# Patient Record
Sex: FEMALE | Race: WHITE | NOT HISPANIC OR LATINO | Employment: FULL TIME | URBAN - METROPOLITAN AREA
[De-identification: names, ages, dates, MRNs, and addresses within clinical notes are randomized per-mention and may not be internally consistent; named-entity substitution may affect disease eponyms.]

---

## 2017-02-14 ENCOUNTER — OFFICE VISIT (OUTPATIENT)
Dept: OBSTETRICS AND GYNECOLOGY | Facility: CLINIC | Age: 52
End: 2017-02-14

## 2017-02-14 VITALS
BODY MASS INDEX: 25.33 KG/M2 | HEIGHT: 65 IN | DIASTOLIC BLOOD PRESSURE: 70 MMHG | WEIGHT: 152 LBS | SYSTOLIC BLOOD PRESSURE: 118 MMHG

## 2017-02-14 DIAGNOSIS — G43.829 MENSTRUAL MIGRAINE WITHOUT STATUS MIGRAINOSUS, NOT INTRACTABLE: ICD-10-CM

## 2017-02-14 DIAGNOSIS — Z00.00 ANNUAL PHYSICAL EXAM: Primary | ICD-10-CM

## 2017-02-14 PROCEDURE — 99396 PREV VISIT EST AGE 40-64: CPT | Performed by: OBSTETRICS & GYNECOLOGY

## 2017-02-14 RX ORDER — GABAPENTIN 100 MG/1
100 CAPSULE ORAL AS NEEDED
COMMUNITY
Start: 2017-01-06 | End: 2019-03-06

## 2017-02-14 RX ORDER — ETONOGESTREL AND ETHINYL ESTRADIOL 11.7; 2.7 MG/1; MG/1
1 INSERT, EXTENDED RELEASE VAGINAL
Qty: 1 EACH | Refills: 12 | Status: SHIPPED | OUTPATIENT
Start: 2017-02-14 | End: 2018-02-14

## 2017-02-14 RX ORDER — SODIUM PHOSPHATE,MONO-DIBASIC 19G-7G/118
1 ENEMA (ML) RECTAL
COMMUNITY
End: 2019-03-06

## 2017-02-14 RX ORDER — IBUPROFEN 800 MG/1
800 TABLET ORAL AS NEEDED
COMMUNITY
Start: 2016-12-29 | End: 2019-03-06

## 2017-02-14 NOTE — PROGRESS NOTES
"Subjective   Chief Complaint   Patient presents with   • Gynecologic Exam   • Headache     Karthik Ibrahim is a 51 y.o. year old  presenting to be seen for her annual exam.    Current birth control method: tubal ligation along with cholecystectomy.  She has always had headaches - she thinks \"hormonal\" back and sides of her head ; left >right and different from other headaches. They occur 1-2 days before her menses and end with menses.     Patient's last menstrual period was 2017.  She does have hot flashes and night sweats; 2 coffee and 2-3 lilly daily   She is sexually active. No pain or problems.     Past 6 month menstrual history:    Cycle Frequency: regular, predictable and consistent every 28 - 32 days   Menstrual cycle character: flow is light were heavier and longer    Cycle Duration: 1 - 2   Number of heavy days of flows: 0   Intermenstrual bleeding present: {no   Post-coital bleeding present: no     She exercises regularly: yes.  Calcium intake: yes.  She performs self breast exam:no.  She has concerns about domestic violence: no.    The following portions of the patient's history were reviewed and updated as appropriate:problem list, current medications, allergies, past family history, past medical history, past social history and past surgical history.    Review of Systems normal bladder \"weak\" - frequency - only leaks if she holds it too long;  and bowels      Objective   Visit Vitals   • /70   • Ht 65.25\" (165.7 cm)   • Wt 152 lb (68.9 kg)   • LMP 2017  Comment: 28 cycle days   • BMI 25.1 kg/m2       General:  well developed; well nourished  no acute distress   Skin:  No suspicious lesions seen   Thyroid: normal to inspection and palpation   Breasts:  Examined in supine position  Symmetric without masses or skin dimpling  Nipples normal without inversion, lesions or discharge   Abdomen: soft, non-tender; no masses  no umbilical or inginual hernias are present  no " hepato-splenomegaly   Pelvis: Clinical staff was present for exam  External genitalia:  normal appearance of the external genitalia including Bartholin's and [place]'s glands.  :  urethral meatus normal; urethral hypermobility is absent.  Vaginal:  normal pink mucosa without prolapse or lesions.  Cervix:  normal appearance. pap   Uterus:  normal size, shape and consistency. retroverted;  Adnexa:  normal bimanual exam of the adnexa.  Rectal:  anus visually normal appearing.   I placed a nuva-ring.  She was able to successfully remove this NuvaRing and properly replace this.       Lab Review   No data reviewed    Imaging  Mammogram report       Assessment   1. Normal GYN exam with exception of menstrual migraines and perimenopausal symptoms of hot flashes/night sweats.  2. She successfully place the NuvaRing.  I discussed that think this may be easier than birth control pills daily.  This should help both her migraines by blocking the drop in estrogen and also providing estrogen the help with her hot flashes.     Plan   1. She will leave this NuvaRing in until her period starts and then replace it.  This is not successful waiting try birth control pills  2. Continue calcium and exercise etc.    Medications Rx this encounter:  New Medications Ordered This Visit   Medications   • gabapentin (NEURONTIN) 100 MG capsule     Sig: Take 100 mg by mouth As Needed.   • ibuprofen (ADVIL,MOTRIN) 800 MG tablet     Sig: Take 800 mg by mouth As Needed.   • glucosamine-chondroitin 500-400 MG capsule capsule     Sig: Take 1 capsule by mouth 3 (Three) Times a Day With Meals.   • Magnesium Oxide (MAG-CAPS PO)     Sig: Take 1 capsule by mouth Daily.   • etonogestrel-ethinyl estradiol (NUVARING) 0.12-0.015 MG/24HR vaginal ring     Sig: Insert 1 each into the vagina Every 28 (Twenty-Eight) Days. Insert vaginally and leave in place continuously.     Dispense:  1 each     Refill:  12          This note was electronically signed.    Franklin CAI  MD Ari  2/14/2017

## 2017-07-24 ENCOUNTER — TRANSCRIBE ORDERS (OUTPATIENT)
Dept: ADMINISTRATIVE | Facility: HOSPITAL | Age: 52
End: 2017-07-24

## 2017-07-24 DIAGNOSIS — Z12.31 VISIT FOR SCREENING MAMMOGRAM: Primary | ICD-10-CM

## 2017-09-11 ENCOUNTER — APPOINTMENT (OUTPATIENT)
Dept: MAMMOGRAPHY | Facility: HOSPITAL | Age: 52
End: 2017-09-11
Attending: OBSTETRICS & GYNECOLOGY

## 2017-09-11 ENCOUNTER — HOSPITAL ENCOUNTER (OUTPATIENT)
Dept: MAMMOGRAPHY | Facility: HOSPITAL | Age: 52
Discharge: HOME | End: 2017-09-11
Attending: OBSTETRICS & GYNECOLOGY | Admitting: OBSTETRICS & GYNECOLOGY

## 2017-09-11 DIAGNOSIS — Z12.31 VISIT FOR SCREENING MAMMOGRAM: ICD-10-CM

## 2017-09-11 PROCEDURE — 77063 BREAST TOMOSYNTHESIS BI: CPT

## 2017-09-11 PROCEDURE — G0202 SCR MAMMO BI INCL CAD: HCPCS

## 2017-09-11 PROCEDURE — 77063 BREAST TOMOSYNTHESIS BI: CPT | Performed by: RADIOLOGY

## 2017-09-11 PROCEDURE — 77067 SCR MAMMO BI INCL CAD: CPT | Performed by: RADIOLOGY

## 2017-10-04 ENCOUNTER — HOSPITAL ENCOUNTER (OUTPATIENT)
Dept: MAMMOGRAPHY | Facility: HOSPITAL | Age: 52
Discharge: HOME | End: 2017-10-04
Admitting: OBSTETRICS & GYNECOLOGY

## 2017-10-04 DIAGNOSIS — R92.8 ABNORMAL MAMMOGRAM: ICD-10-CM

## 2017-10-04 PROCEDURE — 77066 DX MAMMO INCL CAD BI: CPT | Performed by: RADIOLOGY

## 2017-10-04 PROCEDURE — G0279 TOMOSYNTHESIS, MAMMO: HCPCS

## 2017-10-04 PROCEDURE — 77062 BREAST TOMOSYNTHESIS BI: CPT | Performed by: RADIOLOGY

## 2017-10-04 PROCEDURE — G0204 DX MAMMO INCL CAD BI: HCPCS

## 2018-02-15 ENCOUNTER — TELEPHONE (OUTPATIENT)
Dept: OBSTETRICS AND GYNECOLOGY | Facility: CLINIC | Age: 53
End: 2018-02-15

## 2018-02-15 ENCOUNTER — OFFICE VISIT (OUTPATIENT)
Dept: OBSTETRICS AND GYNECOLOGY | Facility: CLINIC | Age: 53
End: 2018-02-15

## 2018-02-15 VITALS
WEIGHT: 145 LBS | DIASTOLIC BLOOD PRESSURE: 70 MMHG | SYSTOLIC BLOOD PRESSURE: 110 MMHG | HEIGHT: 55 IN | BODY MASS INDEX: 33.56 KG/M2

## 2018-02-15 DIAGNOSIS — G43.829 MENSTRUAL MIGRAINE WITHOUT STATUS MIGRAINOSUS, NOT INTRACTABLE: ICD-10-CM

## 2018-02-15 DIAGNOSIS — K63.5 BENIGN COLON POLYP: ICD-10-CM

## 2018-02-15 DIAGNOSIS — Z01.419 WOMEN'S ANNUAL ROUTINE GYNECOLOGICAL EXAMINATION: Primary | ICD-10-CM

## 2018-02-15 PROBLEM — Z98.51 S/P TUBAL LIGATION: Status: ACTIVE | Noted: 2018-02-15

## 2018-02-15 PROBLEM — Z82.62 FAMILY HISTORY OF OSTEOPOROSIS IN MOTHER: Status: ACTIVE | Noted: 2018-02-15

## 2018-02-15 PROCEDURE — 99396 PREV VISIT EST AGE 40-64: CPT | Performed by: OBSTETRICS & GYNECOLOGY

## 2018-02-15 RX ORDER — KETOCONAZOLE 20 MG/G
CREAM TOPICAL
COMMUNITY
Start: 2017-12-08 | End: 2019-03-06

## 2018-02-15 RX ORDER — ETONOGESTREL AND ETHINYL ESTRADIOL 11.7; 2.7 MG/1; MG/1
1 INSERT, EXTENDED RELEASE VAGINAL
Qty: 1 EACH | Refills: 12 | Status: SHIPPED | OUTPATIENT
Start: 2018-02-15 | End: 2018-08-24 | Stop reason: ALTCHOICE

## 2018-02-15 NOTE — PROGRESS NOTES
"Subjective   Chief Complaint   Patient presents with   • Annual Exam     Karthik Ibrahim is a 52 y.o. year old  presenting to be seen for her annual exam.    Current birth control method: NuvaRing vaginal inserts. Some hot flashes and up at night with nocturia 2- 3 times.Drinks liquids up until bedtime. Discussed options.  She would rather get up in the middle night to give up her liquids at night.  Mother has osteoporosis wondered about a DEXA scanning.  I think with her adequate calcium and exercise and young age I would wait on screening DEXA scan.      No LMP recorded. Patient is not currently having periods (Reason: Perimenopausal).    She is sexually active.   Condoms are not typically used.      Past 6 month menstrual history:    Cycle Frequency: absent                         She exercises regularly: yes. [place] marathon finish 4 1/2 hours and tennis  Calcium intake: yes.  She performs self breast exam:yes.  She has concerns about domestic violence: no.    The following portions of the patient's history were reviewed and updated as appropriate:problem list, current medications, allergies, past family history, past medical history, past social history and past surgical history.    Review of Systems    normal bladder ( always frequency)  and bowels   Objective   /70  Ht 65.3 cm (25.69\")  Wt 65.8 kg (145 lb)  .48 kg/m2     General:  well developed; well nourished  no acute distress  appears stated age   Skin:  No suspicious lesions seen   Thyroid:    Breasts:  Examined in supine position  Symmetric without masses or skin dimpling  Nipples normal without inversion, lesions or discharge  There are no palpable axillary nodes   Abdomen: soft, non-tender; no masses  no umbilical or inginual hernias are present  no hepato-splenomegaly   Pelvis: Clinical staff was present for exam  External genitalia:  normal appearance of the external genitalia including Bartholin's and [place]'s glands.  :  " urethral meatus normal;  Cervix:  normal appearance. nuvaring in proper position  Uterus:  normal size, shape and consistency.  Adnexa:  normal bimanual exam of the adnexa.  Rectal:  anus visually normal appearing. recto-vaginal exam unremarkable and confirms findings; good sphincter tone; no masses; guaiac negative;     Lab Review   No data reviewed    Imaging  Colonoscopy 2011 hyperplastic polyp       Assessment   1. Normal GYN examination perimenopausal.  Currently on NuvaRing with no menses.  Some hot flashes occasionally will probably discontinue the NuvaRing until maybe age 52 to stop and check levels  2. Nocturia but she drinks liquids until bedtime and has water at her bedside  3. History of hyperplastic polyp 2011 will need repeat colonoscopy     Plan   1. 1000 mg calcium in divided doses ideally in diet; regular exercise  2. As above annual or sooner as needed      Medications Rx this encounter:  New Medications Ordered This Visit   Medications   • ketoconazole (NIZORAL) 2 % cream   • etonogestrel-ethinyl estradiol (NUVARING) 0.12-0.015 MG/24HR vaginal ring     Sig: Insert 1 each into the vagina Every 28 (Twenty-Eight) Days. Insert vaginally and leave in place for 4 weeks     Dispense:  1 each     Refill:  12          This note was electronically signed.    Franklin Chapman MD  2/15/2018

## 2018-02-15 NOTE — TELEPHONE ENCOUNTER
Provider Name: Dr Chapman  Reason for Call: Directions on Nuvaring clarified  Pharmacy Name: [facility]   Call Back Number: [phone]

## 2018-05-04 DIAGNOSIS — Z12.11 SCREENING FOR COLON CANCER: Primary | ICD-10-CM

## 2018-05-09 ENCOUNTER — TELEPHONE (OUTPATIENT)
Dept: GASTROENTEROLOGY | Facility: CLINIC | Age: 53
End: 2018-05-09

## 2018-05-09 NOTE — TELEPHONE ENCOUNTER
Nubiasusana called inquiring about prep instructions now that she has been prescribed Gavalyte G instead of the original. Informed pt she will follow the same instructions, taking half at 6pm and the other half 4-6hours before procedure time. Stated verbal understanding.

## 2018-05-10 ENCOUNTER — OUTSIDE FACILITY SERVICE (OUTPATIENT)
Dept: GASTROENTEROLOGY | Facility: CLINIC | Age: 53
End: 2018-05-10

## 2018-05-10 PROCEDURE — G0105 COLORECTAL SCRN; HI RISK IND: HCPCS | Performed by: INTERNAL MEDICINE

## 2018-08-22 ENCOUNTER — TRANSCRIBE ORDERS (OUTPATIENT)
Dept: ADMINISTRATIVE | Facility: HOSPITAL | Age: 53
End: 2018-08-22

## 2018-08-22 DIAGNOSIS — Z12.31 VISIT FOR SCREENING MAMMOGRAM: Primary | ICD-10-CM

## 2018-08-23 ENCOUNTER — TELEPHONE (OUTPATIENT)
Dept: OBSTETRICS AND GYNECOLOGY | Facility: CLINIC | Age: 53
End: 2018-08-23

## 2018-08-23 NOTE — TELEPHONE ENCOUNTER
She needs appt to evaluate her bleeding - possible biopsy and or offfice hysteroscopy   Could take out Nuvaring and have menses if it is used continuously

## 2018-08-24 ENCOUNTER — OFFICE VISIT (OUTPATIENT)
Dept: OBSTETRICS AND GYNECOLOGY | Facility: CLINIC | Age: 53
End: 2018-08-24

## 2018-08-24 VITALS
SYSTOLIC BLOOD PRESSURE: 126 MMHG | RESPIRATION RATE: 16 BRPM | WEIGHT: 143 LBS | BODY MASS INDEX: 152.35 KG/M2 | DIASTOLIC BLOOD PRESSURE: 70 MMHG

## 2018-08-24 DIAGNOSIS — N93.9 ABNORMAL UTERINE BLEEDING: ICD-10-CM

## 2018-08-24 DIAGNOSIS — N95.1 MENOPAUSAL SYMPTOMS: Primary | ICD-10-CM

## 2018-08-24 PROCEDURE — 99213 OFFICE O/P EST LOW 20 MIN: CPT | Performed by: OBSTETRICS & GYNECOLOGY

## 2018-08-24 RX ORDER — CITALOPRAM 20 MG/1
20 TABLET ORAL DAILY
Qty: 30 TABLET | Refills: 5 | Status: SHIPPED | OUTPATIENT
Start: 2018-08-24 | End: 2019-07-15

## 2018-08-24 NOTE — TELEPHONE ENCOUNTER
Spoke with patient concerning Dr Chapman' recommendations to remove Nuvaring and have menses as normal. Pt agrees to this POC as well as scheduling an appt to see Dr Chapman.

## 2018-08-24 NOTE — PROGRESS NOTES
Subjective   Chief Complaint   Patient presents with   • Vaginal Bleeding     since 20 to present/rose/hot flashes and night sweats     Nubiasusana Ibrahim is a 52 y.o. year old  presenting to be seen with complaints of trouble with her menses.   Current birth control method: NuvaRing vaginal inserts.  She was doing well with only some spotting back in February of this year.  Was using NuvaRing continuously then on about  she started bleeding pretty much continuously since then.  She is also having night sweats more so so than hot flashes.  No family history hypothyroid.  An aunt had thyroid surgery.    The last time she recalls having predictable regular cycles was prior to February.     · Additional notable symptoms: hot flashes and night sweats  · Changes in weight: weight has been stable  · Recent increase in stress? no  · Is there associated pain ? no    Past 6 month menstrual and contraceptive history:    No LMP recorded. Patient is perimenopausal.                            She is sexually active.  In the past 12 months there has not been new sexual partners.  Condoms are not typically used.  She would not like to be screened for STD's at today's exam.     The following portions of the patient's history were reviewed and updated as appropriate:current medications and allergies    Review of Systems      Objective   /70   Resp 16   Wt 64.9 kg (143 lb)   .35 kg/m²     General:  well developed; well nourished  no acute distress  appears stated age   Skin:  No suspicious lesions seen   Thyroid: normal to inspection and palpation   Lungs:  breathing is unlabored   Heart:  Not performed.   Abdomen: no umbilical or inginual hernias are present  no hepato-splenomegaly  Minimal tenderness left side   Pelvis: Clinical staff was present for exam  External genitalia:  normal appearance of the external genitalia including Bartholin's and [place]'s glands.  :  urethral meatus  normal;  Vaginal:  normal pink mucosa without prolapse or lesions. blood present -  small amount;  Cervix:  normal appearance. Glandular cells seen particularly posteriorly silver nitrate treated what from office  Uterus:  normal size, shape and consistency. anteverted;  Adnexa:  normal bimanual exam of the adnexa.  Rectal:  digital rectal exam not performed; anus visually normal appearing.     Lab Review   No data reviewed    Imaging   Mammogram report       Assessment   1. Perimenopausal bleeding with NuvaRing in continuously.  Symptoms of menopause.  I think we should check FSH LH TSH leave the NuvaRing out until get these results back use condoms for birth control  2. Low likelihood of any sort of endometrial cancer given the fact she's been on the NuvaRing continuously.  She status post tubal ligation actually using the NuvaRing for migraine headaches.  Discussed over-the-counter options in case this causes a migraine stopping the NuvaRing.  3. She has a addendum question regarding starting something for anxiety depression she's to have these issues in the past.  We'll try Celexa.     Plan   1. As above; check labs Monday.  She has kicked get the blood drawn today and she just took the NuvaRing yesterday.  2. We'll start Celexa may help her hot flashes a bit and also anxiety/depression has come back.    Medications Rx this encounter:  No orders of the defined types were placed in this encounter.         This note was electronically signed.    Franklin Chapman MD  August 24, 2018

## 2018-08-29 ENCOUNTER — LAB (OUTPATIENT)
Dept: LAB | Facility: HOSPITAL | Age: 53
End: 2018-08-29

## 2018-08-29 DIAGNOSIS — N93.9 ABNORMAL UTERINE BLEEDING: ICD-10-CM

## 2018-08-29 DIAGNOSIS — N95.1 MENOPAUSAL SYMPTOMS: ICD-10-CM

## 2018-08-29 LAB
FSH SERPL-ACNC: 20.7 MIU/ML
LH SERPL-ACNC: 6.5 MIU/ML
TSH SERPL DL<=0.05 MIU/L-ACNC: 0.9 MIU/ML (ref 0.35–5.35)

## 2018-08-29 PROCEDURE — 84443 ASSAY THYROID STIM HORMONE: CPT

## 2018-08-29 PROCEDURE — 36415 COLL VENOUS BLD VENIPUNCTURE: CPT

## 2018-08-29 PROCEDURE — 83001 ASSAY OF GONADOTROPIN (FSH): CPT

## 2018-08-29 PROCEDURE — 83002 ASSAY OF GONADOTROPIN (LH): CPT

## 2018-09-28 ENCOUNTER — HOSPITAL ENCOUNTER (OUTPATIENT)
Dept: MAMMOGRAPHY | Facility: HOSPITAL | Age: 53
Discharge: HOME | End: 2018-09-28
Attending: OBSTETRICS & GYNECOLOGY | Admitting: OBSTETRICS & GYNECOLOGY

## 2018-09-28 DIAGNOSIS — Z12.31 VISIT FOR SCREENING MAMMOGRAM: ICD-10-CM

## 2018-09-28 PROCEDURE — 77063 BREAST TOMOSYNTHESIS BI: CPT

## 2018-09-28 PROCEDURE — 77067 SCR MAMMO BI INCL CAD: CPT

## 2018-09-28 PROCEDURE — 77063 BREAST TOMOSYNTHESIS BI: CPT | Performed by: RADIOLOGY

## 2018-09-28 PROCEDURE — 77067 SCR MAMMO BI INCL CAD: CPT | Performed by: RADIOLOGY

## 2019-02-18 RX ORDER — ETONOGESTREL/ETHINYL ESTRADIOL .12-.015MG
RING, VAGINAL VAGINAL
Qty: 1 EACH | Refills: 2 | Status: SHIPPED | OUTPATIENT
Start: 2019-02-18 | End: 2019-12-07

## 2019-03-06 ENCOUNTER — OFFICE VISIT (OUTPATIENT)
Dept: OBSTETRICS AND GYNECOLOGY | Facility: CLINIC | Age: 54
End: 2019-03-06

## 2019-03-06 VITALS
DIASTOLIC BLOOD PRESSURE: 70 MMHG | HEIGHT: 66 IN | SYSTOLIC BLOOD PRESSURE: 120 MMHG | BODY MASS INDEX: 24.11 KG/M2 | WEIGHT: 150 LBS

## 2019-03-06 DIAGNOSIS — Z01.419 ENCOUNTER FOR GYNECOLOGICAL EXAMINATION WITHOUT ABNORMAL FINDING: Primary | ICD-10-CM

## 2019-03-06 DIAGNOSIS — Z82.62 FAMILY HISTORY OF DISUSE OSTEOPOROSIS: ICD-10-CM

## 2019-03-06 DIAGNOSIS — N95.1 PERIMENOPAUSAL SYMPTOMS: ICD-10-CM

## 2019-03-06 PROBLEM — K63.5 HYPERPLASTIC COLONIC POLYP: Status: ACTIVE | Noted: 2019-03-06

## 2019-03-06 PROBLEM — Z98.51 S/P TUBAL LIGATION: Status: RESOLVED | Noted: 2018-02-15 | Resolved: 2019-03-06

## 2019-03-06 PROCEDURE — 99396 PREV VISIT EST AGE 40-64: CPT | Performed by: OBSTETRICS & GYNECOLOGY

## 2019-03-06 RX ORDER — ALBUTEROL SULFATE 90 UG/1
AEROSOL, METERED RESPIRATORY (INHALATION) EVERY 4 HOURS
COMMUNITY
End: 2019-07-15

## 2019-03-06 RX ORDER — NICOTINE POLACRILEX 2 MG
GUM BUCCAL
COMMUNITY
End: 2019-12-07

## 2019-03-06 NOTE — PROGRESS NOTES
Subjective   Chief Complaint   Patient presents with   • Annual Exam     Karthik Ibrahim is a 53 y.o. year old  presenting to be seen for her annual exam.    Current birth control method: Nuvaring .  Discussed the last year her FSH TSH were normal.  She does not recall having any lab work done the last few years.  She did see both pulmonary and cardiology at the clinic because she has shortness of breath when she climbs stairs.  Her feet feel heavy.  However she is able to run for 2 miles or so without a problem.  Had stress test that walked longer than required with her heart rate up and passed the stress test.  They are not sure why she is short of breath climbing stairs neither am I  quite frankly.  She is using the NuvaRing so not have any menses using it continuously.  She is due to take 1 out discussed it would probably leave that out a couple weeks check some lab work.  There is family history of osteoporosis and osteopenia and her mother got her on vitamin D but no having lab work values but she said she recalls have it was not terribly low she has been taking vitamin D ever since given history of skin cancer and inability stepson for long periods of time.    No LMP recorded. Patient is perimenopausal.    She is sexually active.   Condoms are not typically used.    [place] is painful or she is having problems :no  She has concerns about domestic violence: no.    Cycle Frequency: absent   Menstrual cycle character: flow has been absent                     She exercises regularly: yes. Runner but SOB on stairs and psssed a stress test last year at [facility];also saw Pulmonary doctors   Calcium intake: is not adequate.2 and a calcium supplement  Caffeine intake: 3 cups of coffee or equivalent   Self breast awareness:no.    Alcohol :regular (heavy) 2 per day calming - all 4 kids 3 adult children live with her ; suggest 7-10 max weekly  Social History    Tobacco Use      Smoking status: Never  "Smoker      Smokeless tobacco: Never Used      The following portions of the patient's history were reviewed and updated as is  appropriate:problem list, current medications, allergies, past medical history and past surgical history.    Current Outpatient Medications:   •  albuterol sulfate HFA (PROAIR HFA) 108 (90 Base) MCG/ACT inhaler, Every 4 (Four) Hours., Disp: , Rfl:   •  B Complex Vitamins (VITAMIN B COMPLEX 100 IJ), vitamin B complex  DAILY, Disp: , Rfl:   •  Biotin 1 MG capsule, biotin  One tablet daily, Disp: , Rfl:   •  Cholecalciferol (VITAMIN D) 1000 units tablet, Vitamin D  DAILY, Disp: , Rfl:   •  citalopram (CELEXA) 20 MG tablet, Take 1 tablet by mouth Daily., Disp: 30 tablet, Rfl: 5  •  Magnesium Oxide (MAG-CAPS PO), Take 1 capsule by mouth Daily., Disp: , Rfl:   •  Multiple Vitamins-Minerals (MULTIVITAMIN ADULT PO), Take  by mouth., Disp: , Rfl:   •  Multiple Vitamins-Minerals (OCUVITE-LUTEIN PO), PreserVision AREDS-2  One tablet daily, Disp: , Rfl:   •  NUVARING 0.12-0.015 MG/24HR vaginal ring, INSERT ONE RING VAGINALLY FOR 21 DAYS, THEN REMOVE FOR 7 DAYS, Disp: 1 each, Rfl: 2    Review of Systems    normal bladder and bowels  Objective   /70   Ht 167.6 cm (66\")   Wt 68 kg (150 lb)   Breastfeeding? No   BMI 24.21 kg/m²       General:  well developed; well nourished  no acute distress  appears stated age   Skin:  No suspicious lesions seen   Thyroid:    Breasts:  Examined in supine position  Symmetric without masses or skin dimpling  Nipples normal without inversion, lesions or discharge  There are no palpable axillary nodes   Abdomen: soft, non-tender; no masses  no umbilical or inguinal hernias are present  no hepato-splenomegaly   Pelvis: Clinical staff was present for exam  External genitalia:  normal appearance of the external genitalia including Bartholin's and [place]'s glands.  :  urethral meatus normal;  Vaginal:  normal pink mucosa without prolapse or lesions.  Cervix:  normal " appearance.  Uterus:  normal size, shape and consistency. retroverted; Nuvaring in proper position  Adnexa:  normal bimanual exam of the adnexa.       Lab Review   FSH, TSH, Pap test and PATHOLOGY      Imaging  Mammogram report       Assessment   1. Perimenopausal GYN examination.  2. Family history of osteoporosis maternal grandmother and osteopenia in her mother who is on medication weekly.  Probably would be a good idea to check DEXA scan as her mother is only 17 years older and has been on medication for a while.  3. Slight alcohol excess.  Discussed health risks this also may increase risk for osteoporosis.  4. Does not appear that lab work lipids etc. been done for a while so we will need to check these in 2 weeks of NuvaRing               Plan   1. 1000 mg calcium in divided doses ideally in diet; regular exercise  2. Self breast awareness and mammogram protocols discussed.  3. Annual examination or sooner as needed  4. Obtain paper chart check labs        No orders of the defined types were placed in this encounter.    Orders Placed This Encounter   Procedures   • DEXA Bone Density Axial     Standing Status:   Future     Standing Expiration Date:   3/6/2020     Order Specific Question:   Reason for Exam:     Answer:   Post-menopausal   • Follicle Stimulating Hormone     Standing Status:   Future     Standing Expiration Date:   3/6/2020   • Luteinizing Hormone     Standing Status:   Future     Standing Expiration Date:   3/6/2020   • Lipid Panel     Standing Status:   Future     Standing Expiration Date:   3/6/2020   • Comprehensive Metabolic Panel     Standing Status:   Future     Standing Expiration Date:   3/6/2020            This note was electronically signed.    Franklin Chapman MD  3/6/2019     Addendum old chart review reviewed and normal vitamin D level normal cholesterol prolactin and TSH 2011 2008.

## 2019-03-30 ENCOUNTER — LAB (OUTPATIENT)
Dept: LAB | Facility: HOSPITAL | Age: 54
End: 2019-03-30

## 2019-03-30 DIAGNOSIS — Z01.419 ENCOUNTER FOR GYNECOLOGICAL EXAMINATION WITHOUT ABNORMAL FINDING: ICD-10-CM

## 2019-03-30 DIAGNOSIS — N95.1 PERIMENOPAUSAL SYMPTOMS: ICD-10-CM

## 2019-03-30 LAB
ALBUMIN SERPL-MCNC: 4.35 G/DL (ref 3.2–4.8)
ALBUMIN/GLOB SERPL: -5.1 G/DL (ref 1.5–2.5)
ALP SERPL-CCNC: 45 U/L (ref 25–100)
ALT SERPL W P-5'-P-CCNC: 18 U/L (ref 7–40)
ANION GAP SERPL CALCULATED.3IONS-SCNC: 5 MMOL/L (ref 3–11)
ARTICHOKE IGE QN: 45 MG/DL (ref 0–130)
AST SERPL-CCNC: 13 U/L (ref 0–33)
BILIRUB SERPL-MCNC: 0.8 MG/DL (ref 0.3–1.2)
BUN BLD-MCNC: 11 MG/DL (ref 9–23)
BUN/CREAT SERPL: 45.8 (ref 7–25)
CALCIUM SPEC-SCNC: 9.3 MG/DL (ref 8.7–10.4)
CHLORIDE SERPL-SCNC: 107 MMOL/L (ref 99–109)
CHOLEST SERPL-MCNC: 89 MG/DL (ref 0–200)
CO2 SERPL-SCNC: 28 MMOL/L (ref 20–31)
CREAT BLD-MCNC: 0.24 MG/DL (ref 0.6–1.3)
FSH SERPL-ACNC: 34.3 MIU/ML
GFR SERPL CREATININE-BSD FRML MDRD: >150 ML/MIN/1.73
GLOBULIN UR ELPH-MCNC: -0.9 GM/DL
GLUCOSE BLD-MCNC: 60 MG/DL (ref 70–100)
HDLC SERPL-MCNC: 78 MG/DL (ref 40–60)
LH SERPL-ACNC: 21.4 MIU/ML
POTASSIUM BLD-SCNC: 4.9 MMOL/L (ref 3.5–5.5)
PROT SERPL-MCNC: 3.5 G/DL (ref 5.7–8.2)
SODIUM BLD-SCNC: 140 MMOL/L (ref 132–146)
TRIGL SERPL-MCNC: 25 MG/DL (ref 0–150)

## 2019-03-30 PROCEDURE — 80053 COMPREHEN METABOLIC PANEL: CPT

## 2019-03-30 PROCEDURE — 83002 ASSAY OF GONADOTROPIN (LH): CPT

## 2019-03-30 PROCEDURE — 83001 ASSAY OF GONADOTROPIN (FSH): CPT

## 2019-03-30 PROCEDURE — 36415 COLL VENOUS BLD VENIPUNCTURE: CPT

## 2019-03-30 PROCEDURE — 80061 LIPID PANEL: CPT

## 2019-04-04 NOTE — PROGRESS NOTES
I would just be sure to stress the importance of follow-up with primary care to repeat her comprehensive metabolic panel.  Thank you

## 2019-04-10 ENCOUNTER — HOSPITAL ENCOUNTER (OUTPATIENT)
Dept: BONE DENSITY | Facility: HOSPITAL | Age: 54
Discharge: HOME | End: 2019-04-10
Admitting: OBSTETRICS & GYNECOLOGY

## 2019-04-10 DIAGNOSIS — Z82.62 FAMILY HISTORY OF DISUSE OSTEOPOROSIS: ICD-10-CM

## 2019-04-10 PROCEDURE — 77080 DXA BONE DENSITY AXIAL: CPT

## 2019-04-11 NOTE — PROGRESS NOTES
Please let her know that her bone density study was normal.  I would encourage adequate calcium and weightbearing exercise.  Probably does not need to be repeated for about 5 or closer to 10 years if she follows the above advice.

## 2019-06-03 ENCOUNTER — TELEPHONE (OUTPATIENT)
Dept: INTERNAL MEDICINE | Facility: CLINIC | Age: 54
End: 2019-06-03

## 2019-06-03 NOTE — TELEPHONE ENCOUNTER
KASSY AND MAX HORVATH CALLED AND ASK FOR DR. RAMIREZ TO SHE HER ASAP. HE TOLD THEM YES. PLEASE CALL HER [phone]

## 2019-07-15 ENCOUNTER — OFFICE VISIT (OUTPATIENT)
Dept: INTERNAL MEDICINE | Facility: CLINIC | Age: 54
End: 2019-07-15

## 2019-07-15 VITALS
HEIGHT: 66 IN | WEIGHT: 142 LBS | BODY MASS INDEX: 22.82 KG/M2 | HEART RATE: 68 BPM | SYSTOLIC BLOOD PRESSURE: 140 MMHG | DIASTOLIC BLOOD PRESSURE: 84 MMHG

## 2019-07-15 DIAGNOSIS — N95.0 POST-MENOPAUSAL BLEEDING: ICD-10-CM

## 2019-07-15 DIAGNOSIS — G43.829 MENSTRUAL MIGRAINE WITHOUT STATUS MIGRAINOSUS, NOT INTRACTABLE: Primary | ICD-10-CM

## 2019-07-15 PROBLEM — R74.8 ABNORMAL LIVER ENZYMES: Status: ACTIVE | Noted: 2019-07-15

## 2019-07-15 LAB
ALBUMIN SERPL-MCNC: 4.5 G/DL (ref 3.5–5.2)
ALBUMIN/GLOB SERPL: 1.7 G/DL
ALP SERPL-CCNC: 63 U/L (ref 39–117)
ALT SERPL W P-5'-P-CCNC: 19 U/L (ref 1–33)
ANION GAP SERPL CALCULATED.3IONS-SCNC: 14 MMOL/L (ref 5–15)
AST SERPL-CCNC: 24 U/L (ref 1–32)
BILIRUB BLD-MCNC: NEGATIVE MG/DL
BILIRUB SERPL-MCNC: 0.7 MG/DL (ref 0.2–1.2)
BUN BLD-MCNC: 11 MG/DL (ref 6–20)
BUN/CREAT SERPL: 15.5 (ref 7–25)
CALCIUM SPEC-SCNC: 9.5 MG/DL (ref 8.6–10.5)
CHLORIDE SERPL-SCNC: 100 MMOL/L (ref 98–107)
CLARITY, POC: CLEAR
CO2 SERPL-SCNC: 28 MMOL/L (ref 22–29)
COLOR UR: YELLOW
CREAT BLD-MCNC: 0.71 MG/DL (ref 0.57–1)
DEPRECATED RDW RBC AUTO: 45.7 FL (ref 37–54)
ERYTHROCYTE [DISTWIDTH] IN BLOOD BY AUTOMATED COUNT: 11.9 % (ref 12.3–15.4)
GFR SERPL CREATININE-BSD FRML MDRD: 87 ML/MIN/1.73
GLOBULIN UR ELPH-MCNC: 2.6 GM/DL
GLUCOSE BLD-MCNC: 91 MG/DL (ref 65–99)
GLUCOSE UR STRIP-MCNC: NEGATIVE MG/DL
HCT VFR BLD AUTO: 44.1 % (ref 34–46.6)
HGB BLD-MCNC: 14.5 G/DL (ref 12–15.9)
KETONES UR QL: NEGATIVE
LEUKOCYTE EST, POC: NEGATIVE
MCH RBC QN AUTO: 33.9 PG (ref 26.6–33)
MCHC RBC AUTO-ENTMCNC: 32.9 G/DL (ref 31.5–35.7)
MCV RBC AUTO: 103 FL (ref 79–97)
NITRITE UR-MCNC: NEGATIVE MG/ML
PH UR: 6.5 [PH] (ref 5–8)
PLATELET # BLD AUTO: 228 10*3/MM3 (ref 140–450)
PMV BLD AUTO: 10.7 FL (ref 6–12)
POTASSIUM BLD-SCNC: 4 MMOL/L (ref 3.5–5.2)
PROT SERPL-MCNC: 7.1 G/DL (ref 6–8.5)
PROT UR STRIP-MCNC: NEGATIVE MG/DL
RBC # BLD AUTO: 4.28 10*6/MM3 (ref 3.77–5.28)
RBC # UR STRIP: ABNORMAL /UL
SODIUM BLD-SCNC: 142 MMOL/L (ref 136–145)
SP GR UR: 1 (ref 1–1.03)
TSH SERPL DL<=0.05 MIU/L-ACNC: 1.38 MIU/ML (ref 0.27–4.2)
UROBILINOGEN UR QL: NORMAL
VIT B12 BLD-MCNC: 774 PG/ML (ref 211–946)
WBC NRBC COR # BLD: 5.68 10*3/MM3 (ref 3.4–10.8)

## 2019-07-15 PROCEDURE — 81003 URINALYSIS AUTO W/O SCOPE: CPT | Performed by: INTERNAL MEDICINE

## 2019-07-15 PROCEDURE — 99202 OFFICE O/P NEW SF 15 MIN: CPT | Performed by: INTERNAL MEDICINE

## 2019-07-15 PROCEDURE — 84443 ASSAY THYROID STIM HORMONE: CPT | Performed by: INTERNAL MEDICINE

## 2019-07-15 PROCEDURE — 80053 COMPREHEN METABOLIC PANEL: CPT | Performed by: INTERNAL MEDICINE

## 2019-07-15 PROCEDURE — 85027 COMPLETE CBC AUTOMATED: CPT | Performed by: INTERNAL MEDICINE

## 2019-07-15 PROCEDURE — 82607 VITAMIN B-12: CPT | Performed by: INTERNAL MEDICINE

## 2019-07-15 NOTE — PROGRESS NOTES
Patient is a 53 y.o. female who is here to establish care for abnormal labs.  Chief Complaint   Patient presents with   • Establish Care     abnormal labs         HPI:    Here for abnormal labs.  Onset when being check in 3/19.  Not a vegan, far from in.  Has not been told in the past was abnormal.  No hematuria.  No diarrhea.  Had a normal colonoscopy in 2018.  Energy level is not the best.  Does not sleep well.  Exercising a lot.      History:     Patient Active Problem List   Diagnosis   • Menstrual migraine without status migrainosus, not intractable   • Family Hx of osteoporosis MGM/osteopenia mother/patient normal DEXA age 51   • Hyperplastic colonic polyp 2014   • Abnormal liver enzymes   • Post-menopausal bleeding       Past Medical History:   Diagnosis Date   • Headache    • Hyperplastic colon polyp 2011 2/15/2018   • Hypertension        Past Surgical History:   Procedure Laterality Date   • CHOLECYSTECTOMY     • COLONOSCOPY W/ BIOPSIES  2011 and 2014   • SKIN CANCER EXCISION      facial moles removed   • TUBAL ABDOMINAL LIGATION     • VAGINAL DELIVERY       x 4       Current Outpatient Medications on File Prior to Visit   Medication Sig   • B Complex Vitamins (VITAMIN B COMPLEX 100 IJ) vitamin B complex   DAILY   • Biotin 1 MG capsule biotin   One tablet daily   • Cholecalciferol (VITAMIN D) 1000 units tablet Vitamin D   DAILY   • Magnesium Oxide (MAG-CAPS PO) Take 1 capsule by mouth Daily.   • Multiple Vitamins-Minerals (MULTIVITAMIN ADULT PO) Take  by mouth.   • Multiple Vitamins-Minerals (OCUVITE-LUTEIN PO) PreserVision AREDS-2   One tablet daily   • NUVARING 0.12-0.015 MG/24HR vaginal ring INSERT ONE RING VAGINALLY FOR 21 DAYS, THEN REMOVE FOR 7 DAYS   • [DISCONTINUED] albuterol sulfate HFA (PROAIR HFA) 108 (90 Base) MCG/ACT inhaler Every 4 (Four) Hours.   • [DISCONTINUED] citalopram (CELEXA) 20 MG tablet Take 1 tablet by mouth Daily.     No current facility-administered medications on file prior to  visit.        Family History   Problem Relation Age of Onset   • Ovarian cancer Maternal Grandmother 70   • Colon cancer Maternal Aunt    • Colon cancer Paternal Grandfather    • Kidney disease Other    • Hepatitis Other    • Heart disease Other    • Skin cancer Other    • BRCA 1/2 Neg Hx    • Breast cancer Neg Hx    • Endometrial cancer Neg Hx        Social History     Socioeconomic History   • Marital status:      Spouse name: Not on file   • Number of children: Not on file   • Years of education: Not on file   • Highest education level: Not on file   Tobacco Use   • Smoking status: Never Smoker   • Smokeless tobacco: Never Used   Substance and Sexual Activity   • Alcohol use: Yes   • Drug use: No   • Sexual activity: Yes     Partners: Male     Birth control/protection: Surgical         Review of Systems   Constitutional: Positive for fatigue. Negative for chills, fever and unexpected weight change.   HENT: Negative for congestion, ear pain, hearing loss, rhinorrhea, sinus pressure, sore throat and trouble swallowing.    Eyes: Negative for discharge and itching.   Respiratory: Negative for cough, chest tightness and shortness of breath.    Cardiovascular: Negative for chest pain, palpitations and leg swelling.   Gastrointestinal: Negative for abdominal pain, blood in stool, constipation, diarrhea and vomiting.        2018 colonoscopy   Endocrine: Negative for polydipsia and polyuria.   Genitourinary: Negative for difficulty urinating, dysuria, enuresis, frequency, hematuria and urgency.        9/18 mammogram   Musculoskeletal: Positive for arthralgias and back pain. Negative for gait problem and joint swelling.   Skin: Negative for rash and wound.   Allergic/Immunologic: Negative for immunocompromised state.   Neurological: Positive for headaches. Negative for dizziness, syncope, weakness, light-headedness and numbness.   Hematological: Does not bruise/bleed easily.   Psychiatric/Behavioral: Positive for  "sleep disturbance. Negative for behavioral problems and dysphoric mood. The patient is not nervous/anxious.        /84   Pulse 68   Ht 167.6 cm (65.98\")   Wt 64.4 kg (142 lb)   BMI 22.93 kg/m²       Physical Exam   Constitutional: She is oriented to person, place, and time. She appears well-developed and well-nourished.   HENT:   Head: Normocephalic and atraumatic.   Right Ear: External ear normal.   Left Ear: External ear normal.   Mouth/Throat: Oropharynx is clear and moist.   Eyes: Conjunctivae and EOM are normal.   Neck: Normal range of motion. Neck supple.   Cardiovascular: Normal rate, regular rhythm and normal heart sounds.   Pulmonary/Chest: Effort normal and breath sounds normal.   Abdominal: Soft. Bowel sounds are normal.   Musculoskeletal: Normal range of motion.   Lymphadenopathy:     She has no cervical adenopathy.   Neurological: She is alert and oriented to person, place, and time.   Skin: Skin is warm and dry.   Psychiatric: She has a normal mood and affect. Her behavior is normal. Thought content normal.       Procedure:      Discussion/Summary:    Abnormal liver enzymes-recheck  Post menopausal bleeding-labs today  Menstrual migraines-consider prozac    Labs noted and dw patient    Current Outpatient Medications:   •  B Complex Vitamins (VITAMIN B COMPLEX 100 IJ), vitamin B complex  DAILY, Disp: , Rfl:   •  Biotin 1 MG capsule, biotin  One tablet daily, Disp: , Rfl:   •  Cholecalciferol (VITAMIN D) 1000 units tablet, Vitamin D  DAILY, Disp: , Rfl:   •  Magnesium Oxide (MAG-CAPS PO), Take 1 capsule by mouth Daily., Disp: , Rfl:   •  Multiple Vitamins-Minerals (MULTIVITAMIN ADULT PO), Take  by mouth., Disp: , Rfl:   •  Multiple Vitamins-Minerals (OCUVITE-LUTEIN PO), PreserVision AREDS-2  One tablet daily, Disp: , Rfl:   •  NUVARING 0.12-0.015 MG/24HR vaginal ring, INSERT ONE RING VAGINALLY FOR 21 DAYS, THEN REMOVE FOR 7 DAYS, Disp: 1 each, Rfl: 2        Karthik was seen today for establish " care.    Diagnoses and all orders for this visit:    Menstrual migraine without status migrainosus, not intractable  -     CBC (No Diff)  -     Vitamin B12  -     TSH  -     Comprehensive Metabolic Panel    Post-menopausal bleeding  -     CBC (No Diff)  -     Vitamin B12  -     TSH  -     Comprehensive Metabolic Panel

## 2020-07-22 ENCOUNTER — LAB (OUTPATIENT)
Dept: LAB | Facility: HOSPITAL | Age: 55
End: 2020-07-22

## 2020-07-22 ENCOUNTER — OFFICE VISIT (OUTPATIENT)
Dept: INTERNAL MEDICINE | Facility: CLINIC | Age: 55
End: 2020-07-22

## 2020-07-22 VITALS
HEIGHT: 66 IN | DIASTOLIC BLOOD PRESSURE: 86 MMHG | HEART RATE: 64 BPM | TEMPERATURE: 98 F | BODY MASS INDEX: 23.14 KG/M2 | SYSTOLIC BLOOD PRESSURE: 134 MMHG | WEIGHT: 144 LBS

## 2020-07-22 DIAGNOSIS — Z00.00 ROUTINE GENERAL MEDICAL EXAMINATION AT A HEALTH CARE FACILITY: ICD-10-CM

## 2020-07-22 DIAGNOSIS — N95.0 POST-MENOPAUSAL BLEEDING: ICD-10-CM

## 2020-07-22 DIAGNOSIS — G43.829 MENSTRUAL MIGRAINE WITHOUT STATUS MIGRAINOSUS, NOT INTRACTABLE: Primary | ICD-10-CM

## 2020-07-22 LAB
ALBUMIN SERPL-MCNC: 4.5 G/DL (ref 3.5–5.2)
ALBUMIN/GLOB SERPL: 1.9 G/DL
ALP SERPL-CCNC: 59 U/L (ref 39–117)
ALT SERPL W P-5'-P-CCNC: 15 U/L (ref 1–33)
ANION GAP SERPL CALCULATED.3IONS-SCNC: 10.5 MMOL/L (ref 5–15)
AST SERPL-CCNC: 24 U/L (ref 1–32)
BILIRUB BLD-MCNC: NEGATIVE MG/DL
BILIRUB SERPL-MCNC: 0.9 MG/DL (ref 0–1.2)
BUN SERPL-MCNC: 17 MG/DL (ref 6–20)
BUN/CREAT SERPL: 22.4 (ref 7–25)
CALCIUM SPEC-SCNC: 9.4 MG/DL (ref 8.6–10.5)
CHLORIDE SERPL-SCNC: 103 MMOL/L (ref 98–107)
CHOLEST SERPL-MCNC: 148 MG/DL (ref 0–200)
CLARITY, POC: CLEAR
CO2 SERPL-SCNC: 27.5 MMOL/L (ref 22–29)
COLOR UR: YELLOW
CREAT SERPL-MCNC: 0.76 MG/DL (ref 0.57–1)
DEPRECATED RDW RBC AUTO: 45.2 FL (ref 37–54)
ERYTHROCYTE [DISTWIDTH] IN BLOOD BY AUTOMATED COUNT: 12.5 % (ref 12.3–15.4)
FSH SERPL-ACNC: 26.5 MIU/ML
GFR SERPL CREATININE-BSD FRML MDRD: 80 ML/MIN/1.73
GLOBULIN UR ELPH-MCNC: 2.4 GM/DL
GLUCOSE SERPL-MCNC: 100 MG/DL (ref 65–99)
GLUCOSE UR STRIP-MCNC: NEGATIVE MG/DL
HCT VFR BLD AUTO: 40.4 % (ref 34–46.6)
HDLC SERPL-MCNC: 78 MG/DL (ref 40–60)
HGB BLD-MCNC: 14.1 G/DL (ref 12–15.9)
KETONES UR QL: ABNORMAL
LDLC SERPL CALC-MCNC: 61 MG/DL (ref 0–100)
LDLC/HDLC SERPL: 0.78 {RATIO}
LEUKOCYTE EST, POC: NEGATIVE
MCH RBC QN AUTO: 34.3 PG (ref 26.6–33)
MCHC RBC AUTO-ENTMCNC: 34.9 G/DL (ref 31.5–35.7)
MCV RBC AUTO: 98.3 FL (ref 79–97)
NITRITE UR-MCNC: NEGATIVE MG/ML
PH UR: 5 [PH] (ref 5–8)
PLATELET # BLD AUTO: 229 10*3/MM3 (ref 140–450)
PMV BLD AUTO: 10.9 FL (ref 6–12)
POTASSIUM SERPL-SCNC: 4.4 MMOL/L (ref 3.5–5.2)
PROT SERPL-MCNC: 6.9 G/DL (ref 6–8.5)
PROT UR STRIP-MCNC: NEGATIVE MG/DL
RBC # BLD AUTO: 4.11 10*6/MM3 (ref 3.77–5.28)
RBC # UR STRIP: ABNORMAL /UL
SODIUM SERPL-SCNC: 141 MMOL/L (ref 136–145)
SP GR UR: 1.02 (ref 1–1.03)
TRIGL SERPL-MCNC: 46 MG/DL (ref 0–150)
TSH SERPL DL<=0.05 MIU/L-ACNC: 1.15 UIU/ML (ref 0.27–4.2)
UROBILINOGEN UR QL: NORMAL
VIT B12 BLD-MCNC: 593 PG/ML (ref 211–946)
VLDLC SERPL-MCNC: 9.2 MG/DL (ref 5–40)
WBC # BLD AUTO: 4.98 10*3/MM3 (ref 3.4–10.8)

## 2020-07-22 PROCEDURE — 99396 PREV VISIT EST AGE 40-64: CPT | Performed by: INTERNAL MEDICINE

## 2020-07-22 PROCEDURE — 84443 ASSAY THYROID STIM HORMONE: CPT | Performed by: INTERNAL MEDICINE

## 2020-07-22 PROCEDURE — 83001 ASSAY OF GONADOTROPIN (FSH): CPT | Performed by: INTERNAL MEDICINE

## 2020-07-22 PROCEDURE — 80053 COMPREHEN METABOLIC PANEL: CPT | Performed by: INTERNAL MEDICINE

## 2020-07-22 PROCEDURE — 80061 LIPID PANEL: CPT | Performed by: INTERNAL MEDICINE

## 2020-07-22 PROCEDURE — 82607 VITAMIN B-12: CPT | Performed by: INTERNAL MEDICINE

## 2020-07-22 PROCEDURE — 81003 URINALYSIS AUTO W/O SCOPE: CPT | Performed by: INTERNAL MEDICINE

## 2020-07-22 PROCEDURE — 85027 COMPLETE CBC AUTOMATED: CPT | Performed by: INTERNAL MEDICINE

## 2020-07-22 NOTE — PROGRESS NOTES
Patient is a 54 y.o. female who is here for a physical.  Chief Complaint   Patient presents with   • Annual Exam         HPI:    Here for CPE.      History:     Patient Active Problem List   Diagnosis   • Menstrual migraine without status migrainosus, not intractable   • Family Hx of osteoporosis MGM/osteopenia mother/patient normal DEXA age 51   • Hyperplastic colonic polyp 2014   • Abnormal liver enzymes   • Post-menopausal bleeding   • Routine general medical examination at a health care facility       Past Medical History:   Diagnosis Date   • Headache    • Hyperplastic colon polyp 2011 2/15/2018   • Hypertension        Past Surgical History:   Procedure Laterality Date   • CHOLECYSTECTOMY     • COLONOSCOPY W/ BIOPSIES  2011 and 2014   • SKIN CANCER EXCISION      facial moles removed   • TUBAL ABDOMINAL LIGATION     • VAGINAL DELIVERY       x 4       No current outpatient medications on file prior to visit.     No current facility-administered medications on file prior to visit.        Family History   Problem Relation Age of Onset   • Ovarian cancer Maternal Grandmother 70   • Colon cancer Maternal Aunt    • Colon cancer Paternal Grandfather    • Kidney disease Other    • Hepatitis Other    • Heart disease Other    • Skin cancer Other    • BRCA 1/2 Neg Hx    • Breast cancer Neg Hx    • Endometrial cancer Neg Hx        Social History     Socioeconomic History   • Marital status:      Spouse name: Not on file   • Number of children: Not on file   • Years of education: Not on file   • Highest education level: Not on file   Tobacco Use   • Smoking status: Never Smoker   • Smokeless tobacco: Never Used   Substance and Sexual Activity   • Alcohol use: Yes   • Drug use: No   • Sexual activity: Yes     Partners: Male     Birth control/protection: Surgical         Review of Systems   Constitutional: Negative for chills, fever and unexpected weight change.   HENT: Negative for congestion, ear pain, hearing loss,  "rhinorrhea, sinus pressure, sore throat and trouble swallowing.    Eyes: Negative for discharge and itching.   Respiratory: Negative for cough, chest tightness and shortness of breath.    Cardiovascular: Negative for chest pain, palpitations and leg swelling.   Gastrointestinal: Negative for abdominal pain, blood in stool, constipation, diarrhea and vomiting.        2018 colonoscopy   Endocrine: Negative for polydipsia and polyuria.   Genitourinary: Negative for difficulty urinating, dysuria, enuresis, frequency, hematuria and urgency.        9/18 mammogram  Menses in 5/20   Musculoskeletal: Positive for arthralgias. Negative for gait problem and joint swelling.   Skin: Negative for rash and wound.   Allergic/Immunologic: Negative for immunocompromised state.   Neurological: Positive for headaches. Negative for dizziness, syncope, weakness, light-headedness and numbness.   Hematological: Does not bruise/bleed easily.   Psychiatric/Behavioral: Negative for behavioral problems and dysphoric mood. The patient is not nervous/anxious.        /86   Pulse 64   Temp 98 °F (36.7 °C) (Infrared)   Ht 167.6 cm (65.98\")   Wt 65.3 kg (144 lb)   BMI 23.25 kg/m²       Physical Exam   Constitutional: She is oriented to person, place, and time. She appears well-developed and well-nourished.   HENT:   Head: Normocephalic and atraumatic.   Right Ear: External ear normal.   Left Ear: External ear normal.   Mouth/Throat: Oropharynx is clear and moist.   Eyes: Pupils are equal, round, and reactive to light. Conjunctivae and EOM are normal.   Neck: Normal range of motion. Neck supple.   Cardiovascular: Normal rate, regular rhythm, normal heart sounds and intact distal pulses.   Pulmonary/Chest: Effort normal and breath sounds normal.   Abdominal: Soft. Bowel sounds are normal.   Musculoskeletal: Normal range of motion.   Neurological: She is alert and oriented to person, place, and time. She has normal reflexes.   Skin: Skin is " warm and dry.   Psychiatric: She has a normal mood and affect. Her behavior is normal. Judgment and thought content normal.   Vitals reviewed.      Procedure:      Discussion/Summary:    Elevated BP-counseled on goal of 130/80 and to notify if above  DJD-tylenol prn  HA-consider BB for px  Post menopausal bleeding-will check fsh  HME-counseled on diet and exercise, fasting labs today    7/22 labs noted and dw patient, advised to repeat UA in 2-3 weeks as she was on menses     Reviewed the following with the patient: advised patient to avoid alcoholic beverages, encouraged patient to exercise 5-7 days per week for 30 minutes at a time, ideal body weight discussed with patient and weight loss encouraged.    No current outpatient medications on file.        Karthik was seen today for annual exam.    Diagnoses and all orders for this visit:    Menstrual migraine without status migrainosus, not intractable    Post-menopausal bleeding  -     Follicle Stimulating Hormone    Routine general medical examination at a health care facility  -     CBC (No Diff)  -     Comprehensive Metabolic Panel  -     Lipid Panel  -     TSH  -     Vitamin B12  -     POC Urinalysis Dipstick, Automated  -     Follicle Stimulating Hormone

## 2020-08-05 ENCOUNTER — OFFICE VISIT (OUTPATIENT)
Dept: OBSTETRICS AND GYNECOLOGY | Facility: CLINIC | Age: 55
End: 2020-08-05

## 2020-08-05 VITALS
WEIGHT: 154 LBS | HEIGHT: 65 IN | BODY MASS INDEX: 25.66 KG/M2 | SYSTOLIC BLOOD PRESSURE: 110 MMHG | DIASTOLIC BLOOD PRESSURE: 70 MMHG

## 2020-08-05 DIAGNOSIS — Z01.419 ENCOUNTER FOR WELL WOMAN EXAM WITH ROUTINE GYNECOLOGICAL EXAM: Primary | ICD-10-CM

## 2020-08-05 DIAGNOSIS — Z12.39 SCREENING FOR BREAST CANCER: ICD-10-CM

## 2020-08-05 DIAGNOSIS — Z01.419 ENCOUNTER FOR WELL WOMAN EXAM WITH ROUTINE GYNECOLOGICAL EXAM: ICD-10-CM

## 2020-08-05 DIAGNOSIS — G43.829 MENSTRUAL MIGRAINE WITHOUT STATUS MIGRAINOSUS, NOT INTRACTABLE: ICD-10-CM

## 2020-08-05 PROBLEM — Z82.62 FAMILY HISTORY OF OSTEOPOROSIS IN MOTHER: Status: RESOLVED | Noted: 2018-02-15 | Resolved: 2020-08-05

## 2020-08-05 PROBLEM — K63.5 HYPERPLASTIC COLONIC POLYP: Status: RESOLVED | Noted: 2019-03-06 | Resolved: 2020-08-05

## 2020-08-05 PROCEDURE — 99396 PREV VISIT EST AGE 40-64: CPT | Performed by: OBSTETRICS & GYNECOLOGY

## 2020-08-05 RX ORDER — ETONOGESTREL AND ETHINYL ESTRADIOL 11.7; 2.7 MG/1; MG/1
1 INSERT, EXTENDED RELEASE VAGINAL
Qty: 1 EACH | Refills: 11 | Status: SHIPPED | OUTPATIENT
Start: 2020-08-05 | End: 2020-12-30

## 2020-08-05 NOTE — PROGRESS NOTES
Subjective   Chief Complaint   Patient presents with   • Annual Exam     Karthik Ibrahim is a 54 y.o. year old  presenting to be seen for her annual exam.    Current birth control method: none.  Discussed that she still having irregular cycles.  Her FSH was only 24.  I would consider her perimenopausal she would rather not get pregnant at this age and would like to be on NuvaRing again I think that certainly very reasonable.  She is having some hot flashes maybe this will help.  We would always be able to next year check the levels that she has had the NuvaRing out for a couple of weeks.    Patient's last menstrual period was 2020.    She is sexually active.   Condoms are not typically used.    [place] is painful or she is having problems :no  She has concerns about domestic violence: no.    Cycle Frequency: irregular   Menstrual cycle character: flow is typically light and flow is typically normal   Cycle Duration: 4 - 5   Number of heavy days of flows: 1   Intermenstrual bleeding present: no   Post-coital bleeding present: no     She exercises regularly: yes.  Self breast awareness:no    Calcium intake: is not adequate.2  Caffeine intake: caffeine use is moderate-to-high daily 2 coffee and 6 cans of diet lilly  Social History    Tobacco Use      Smoking status: Never Smoker      Smokeless tobacco: Never Used    Social History     Substance and Sexual Activity   Alcohol Use Yes   • Alcohol/week: 14.0 standard drinks   • Types: 14 Glasses of wine per week   • Frequency: 4 or more times a week   • Binge frequency: Never    Comment: suggest 10 per week max        The following portions of the patient's history were reviewed and updated as is  appropriate:problem list, current medications, allergies, past family history, past medical history, past social history and past surgical history.    Current Outpatient Medications:   •  etonogestrel-ethinyl estradiol (NuvaRing) 0.12-0.015 MG/24HR vaginal ring,  "Insert 1 each into the vagina Every 28 (Twenty-Eight) Days. Use intravaginally as directed, Disp: 1 each, Rfl: 11    Review of systems  Constitutional    POS weight gain hot flashes                            NEG anorexia or fevers  Breast                POS nothing reported                            NEG persistent breast lump, skin dimpling, breast tenderness or nipple discharge  GI                      POS nothing reported                            NEG bloating, change in bowel habits, melena or reflux symptoms                       POS frequency                            NEG dysuria or hematuria         Objective   /70   Ht 164.5 cm (64.75\")   Wt 69.9 kg (154 lb)   LMP 07/23/2020   Breastfeeding No   BMI 25.83 kg/m²       General:  well developed; well nourished  no acute distress  appears stated age   Skin:  No suspicious lesions seen   Thyroid:    Breasts:  Examined in supine position  Symmetric without masses or skin dimpling  Nipples normal without inversion, lesions or discharge  Fibrocystic changes are present both breasts without a discrete mass   Abdomen: soft, non-tender; no masses  no umbilical or inguinal hernias are present  no hepato-splenomegaly   Pelvis: Clinical staff was present for exam  External genitalia:  normal appearance of the external genitalia including Bartholin's and [place]'s glands.  :  urethral meatus normal;  Vaginal:  normal pink mucosa without prolapse or lesions. I did feel relatively asymptomatic vaginal band behind the cervix at 6:00  Cervix:  normal appearance. friable; Pap obtained  Uterus:  normal size, shape and consistency. retroverted;  Adnexa:  normal bimanual exam of the adnexa.  Rectal:  digital rectal exam not performed; anus visually normal appearing.       Lab Review   CBC, CMP, FSH, LIPIDS, Pap test, TSH and UA    Imaging  DEXA  Mammogram report       Assessment     1. Perimenopausal examination Pap testing caught up to date today  2. She is " overdue for mammogram we will order that; she has had colonoscopies with polyps up-to-date had a DEXA scan which was normal (due to family history)  3. I would like to see her cut back on her caffeine intake and limit her wine to no more than 10 glasses/week.             Plan     1. Annual examination or sooner as needed  2. 1000 mg calcium in divided doses ideally in diet; regular exercise  3. Self breast awareness discussed  4.   Limit to mine and caffeine            New Medications Ordered This Visit   Medications   • etonogestrel-ethinyl estradiol (NuvaRing) 0.12-0.015 MG/24HR vaginal ring     Sig: Insert 1 each into the vagina Every 28 (Twenty-Eight) Days. Use intravaginally as directed     Dispense:  1 each     Refill:  11     Orders Placed This Encounter   Procedures   • Mammo Screening Digital Tomosynthesis Bilateral With CAD     Standing Status:   Future     Standing Expiration Date:   8/5/2021     Order Specific Question:   Reason for Exam:     Answer:   s           This note was electronically signed.    Franklin Chapman MD  8/5/2020

## 2020-09-17 DIAGNOSIS — R06.83 SNORING: Primary | ICD-10-CM

## 2020-11-10 ENCOUNTER — OFFICE VISIT (OUTPATIENT)
Dept: INTERNAL MEDICINE | Facility: CLINIC | Age: 55
End: 2020-11-10

## 2020-11-10 VITALS
RESPIRATION RATE: 16 BRPM | WEIGHT: 151 LBS | HEART RATE: 73 BPM | TEMPERATURE: 97.7 F | HEIGHT: 65 IN | OXYGEN SATURATION: 97 % | BODY MASS INDEX: 25.16 KG/M2 | DIASTOLIC BLOOD PRESSURE: 98 MMHG | SYSTOLIC BLOOD PRESSURE: 132 MMHG

## 2020-11-10 DIAGNOSIS — R03.0 ELEVATED BLOOD PRESSURE READING: ICD-10-CM

## 2020-11-10 DIAGNOSIS — F41.9 ANXIETY: ICD-10-CM

## 2020-11-10 DIAGNOSIS — F33.1 MODERATE EPISODE OF RECURRENT MAJOR DEPRESSIVE DISORDER (HCC): Primary | ICD-10-CM

## 2020-11-10 PROCEDURE — 99214 OFFICE O/P EST MOD 30 MIN: CPT | Performed by: NURSE PRACTITIONER

## 2020-11-10 RX ORDER — FLUOXETINE HYDROCHLORIDE 20 MG/1
20 CAPSULE ORAL DAILY
Qty: 30 CAPSULE | Refills: 2 | Status: SHIPPED | OUTPATIENT
Start: 2020-11-10 | End: 2021-02-05

## 2020-11-10 RX ORDER — PROPRANOLOL HYDROCHLORIDE 20 MG/1
TABLET ORAL
Qty: 60 TABLET | Refills: 2 | Status: SHIPPED | OUTPATIENT
Start: 2020-11-10 | End: 2021-01-08

## 2020-12-01 PROBLEM — F41.9 ANXIETY: Status: ACTIVE | Noted: 2020-12-01

## 2020-12-01 PROBLEM — F41.9 ANXIETY: Status: RESOLVED | Noted: 2020-12-01 | Resolved: 2020-12-01

## 2020-12-02 ENCOUNTER — HOSPITAL ENCOUNTER (OUTPATIENT)
Dept: MAMMOGRAPHY | Facility: HOSPITAL | Age: 55
Discharge: HOME | End: 2020-12-02
Admitting: OBSTETRICS & GYNECOLOGY

## 2020-12-02 DIAGNOSIS — Z12.39 SCREENING FOR BREAST CANCER: ICD-10-CM

## 2020-12-02 PROCEDURE — 77063 BREAST TOMOSYNTHESIS BI: CPT

## 2020-12-02 PROCEDURE — 77063 BREAST TOMOSYNTHESIS BI: CPT | Performed by: RADIOLOGY

## 2020-12-02 PROCEDURE — 77067 SCR MAMMO BI INCL CAD: CPT

## 2020-12-02 PROCEDURE — 77067 SCR MAMMO BI INCL CAD: CPT | Performed by: RADIOLOGY

## 2020-12-03 ENCOUNTER — OFFICE VISIT (OUTPATIENT)
Dept: INTERNAL MEDICINE | Facility: CLINIC | Age: 55
End: 2020-12-03

## 2020-12-03 ENCOUNTER — APPOINTMENT (OUTPATIENT)
Dept: GENERAL RADIOLOGY | Facility: HOSPITAL | Age: 55
End: 2020-12-03

## 2020-12-03 ENCOUNTER — TELEPHONE (OUTPATIENT)
Dept: INTERNAL MEDICINE | Facility: CLINIC | Age: 55
End: 2020-12-03

## 2020-12-03 VITALS
TEMPERATURE: 97.1 F | HEIGHT: 65 IN | WEIGHT: 155 LBS | SYSTOLIC BLOOD PRESSURE: 152 MMHG | RESPIRATION RATE: 16 BRPM | BODY MASS INDEX: 25.83 KG/M2 | OXYGEN SATURATION: 98 % | HEART RATE: 60 BPM | DIASTOLIC BLOOD PRESSURE: 98 MMHG

## 2020-12-03 DIAGNOSIS — M54.2 NECK PAIN: Primary | ICD-10-CM

## 2020-12-03 DIAGNOSIS — M54.6 ACUTE MIDLINE THORACIC BACK PAIN: ICD-10-CM

## 2020-12-03 PROCEDURE — 99214 OFFICE O/P EST MOD 30 MIN: CPT | Performed by: NURSE PRACTITIONER

## 2020-12-03 RX ORDER — METHOCARBAMOL 750 MG/1
TABLET, FILM COATED ORAL
Qty: 60 TABLET | Refills: 2 | Status: SHIPPED | OUTPATIENT
Start: 2020-12-03 | End: 2021-08-20

## 2020-12-03 RX ORDER — METHYLPREDNISOLONE 4 MG/1
TABLET ORAL
Qty: 21 TABLET | Refills: 0 | Status: SHIPPED | OUTPATIENT
Start: 2020-12-03 | End: 2020-12-30

## 2020-12-03 RX ORDER — KETOCONAZOLE 20 MG/G
CREAM TOPICAL
COMMUNITY
Start: 2020-11-23 | End: 2021-08-20

## 2020-12-03 RX ORDER — DICLOFENAC SODIUM 75 MG/1
75 TABLET, DELAYED RELEASE ORAL 2 TIMES DAILY PRN
Qty: 60 TABLET | Refills: 2 | Status: SHIPPED | OUTPATIENT
Start: 2020-12-03 | End: 2021-08-20

## 2020-12-03 NOTE — PROGRESS NOTES
Subjective   Chief Complaint   Patient presents with   • Neck Pain     x 4 days   • Back Pain     cervical      Karthik Ibrahim is a 55 y.o. female here today for neck and back pain. She has recurrent neck pain and has been experiencing tingling in her fingers bilaterally for about 4 days. She is having new onset thoracic pain between shoulder blades. She has tried new pillows and bought new mattress with no improvement. She has decreased range of motion due to pain. No recent injuries.       I have reviewed the following portions of the patient's history and confirmed they are accurate: allergies, current medications, past family history, past medical history, past social history, past surgical history and problem list    I have personally completed the patient's review of systems.    Review of Systems   Constitutional: Negative for activity change, appetite change, chills, diaphoresis, fatigue, fever, unexpected weight gain and unexpected weight loss.   HENT: Negative for ear discharge, ear pain, mouth sores, nosebleeds, sinus pressure, sneezing and sore throat.    Eyes: Negative for pain, discharge and itching.   Respiratory: Negative for cough, chest tightness, shortness of breath and wheezing.    Cardiovascular: Negative for chest pain, palpitations and leg swelling.   Gastrointestinal: Negative for abdominal pain, constipation, diarrhea, nausea and vomiting.   Endocrine: Negative for heat intolerance, polydipsia and polyphagia.   Genitourinary: Negative for dysuria, flank pain, frequency, hematuria and urgency.   Musculoskeletal: Positive for back pain, myalgias, neck pain and neck stiffness. Negative for arthralgias, gait problem and joint swelling.   Skin: Negative for color change, pallor and rash.   Allergic/Immunologic: Negative for immunocompromised state.   Neurological: Negative for seizures, speech difficulty, weakness, numbness and headache.   Hematological: Negative for adenopathy.  "  Psychiatric/Behavioral: Positive for sleep disturbance. Negative for agitation, decreased concentration, dysphoric mood, suicidal ideas, depressed mood and stress. The patient is not nervous/anxious.        Current Outpatient Medications on File Prior to Visit   Medication Sig   • FLUoxetine (PROzac) 20 MG capsule Take 1 capsule by mouth Daily.   • ketoconazole (NIZORAL) 2 % cream    • propranolol (INDERAL) 20 MG tablet Take 1/2 to 1 tablet by mouth 3 times a day as needed for anxiety.     No current facility-administered medications on file prior to visit.        Objective   Vitals:    12/03/20 1100   BP: 152/98   Pulse: 60   Resp: 16   Temp: 97.1 °F (36.2 °C)   TempSrc: Temporal   SpO2: 98%   Weight: 70.3 kg (155 lb)   Height: 164.5 cm (64.75\")   PainSc:   2   PainLoc: Neck     Body mass index is 25.99 kg/m².    Physical Exam  Vitals signs reviewed.   Constitutional:       Appearance: Normal appearance. She is well-developed.   HENT:      Head: Normocephalic and atraumatic.      Nose: Nose normal.   Eyes:      General: Lids are normal.      Conjunctiva/sclera: Conjunctivae normal.      Pupils: Pupils are equal, round, and reactive to light.   Neck:      Musculoskeletal: Muscular tenderness present.      Thyroid: No thyromegaly.      Trachea: Trachea normal.   Pulmonary:      Effort: Pulmonary effort is normal. No respiratory distress.   Musculoskeletal:      Cervical back: She exhibits decreased range of motion, tenderness, pain and spasm.      Thoracic back: She exhibits tenderness, pain and spasm.   Skin:     General: Skin is warm and dry.   Neurological:      Mental Status: She is alert and oriented to person, place, and time.      GCS: GCS eye subscore is 4. GCS verbal subscore is 5. GCS motor subscore is 6.   Psychiatric:         Attention and Perception: Attention normal.         Mood and Affect: Mood normal.         Speech: Speech normal.         Behavior: Behavior normal. Behavior is cooperative. "         Assessment/Plan   Problem List Items Addressed This Visit     None      Visit Diagnoses     Neck pain    -  Primary  Recurrent issue unstable requiring medication management and monitoring. Will eat well balanced diet, increase water intake, increase physical activity as tolerated, and get adequate rest. Can use warmth or ice for discomfort in this area. Discussed stretching exercises.   Start voltaren, robaxin PRN and prednisone pack.     Relevant Medications    diclofenac (VOLTAREN) 75 MG EC tablet    methocarbamol (ROBAXIN) 750 MG tablet    Other Relevant Orders    XR Spine Cervical Complete 4 or 5 View    Acute midline thoracic back pain      New issue unstable requiring medication management and monitoring. Will eat well balanced diet, increase water intake, increase physical activity as tolerated, and get adequate rest. Can use warmth or ice for discomfort in this area. Discussed stretching exercises.   Start voltaren, robaxin PRN and prednisone pack.       Relevant Medications    diclofenac (VOLTAREN) 75 MG EC tablet    methocarbamol (ROBAXIN) 750 MG tablet    Other Relevant Orders    XR Spine Thoracic 3 View             Current Outpatient Medications:   •  FLUoxetine (PROzac) 20 MG capsule, Take 1 capsule by mouth Daily., Disp: 30 capsule, Rfl: 2  •  ketoconazole (NIZORAL) 2 % cream, , Disp: , Rfl:   •  propranolol (INDERAL) 20 MG tablet, Take 1/2 to 1 tablet by mouth 3 times a day as needed for anxiety., Disp: 60 tablet, Rfl: 2  •  diclofenac (VOLTAREN) 75 MG EC tablet, Take 1 tablet by mouth 2 (Two) Times a Day As Needed (moderate pain). Take with food., Disp: 60 tablet, Rfl: 2  •  methocarbamol (ROBAXIN) 750 MG tablet, Take 1/2-1 tablet by mouth 4 times daily as needed for muscle spasms., Disp: 60 tablet, Rfl: 2  •  olmesartan (Benicar) 20 MG tablet, Take 1 tablet by mouth Every Night., Disp: 30 tablet, Rfl: 5       Plan of care reviewed with the patient at the conclusion of today's visit.   Education was provided regarding diagnosis, management, and any prescribed or recommended OTC medications.  Patient verbalized understanding of and agreement with management plan.     Return in about 1 month (around 1/3/2021), or if symptoms worsen or fail to improve, for Cancel Dec 10th appt with cony and schedule telephone follow up with me in one month.      Edith Birmingham, [place]    Please note that portions of this note were completed with a voice recognition program. Efforts were made to edit the dictations, but occasionally words are mistranscribed.

## 2020-12-03 NOTE — TELEPHONE ENCOUNTER
THE PATIENT STATES SHE HAD AN APPOINTMENT WITH CRISTINA WEINBERG EARLIER TODAY, 12/03/2020 AND HAS AN ADDITIONAL QUESTION AND IS REQUESTING CRISTINA TO CALL HER.  PLEASE CALL [phone]

## 2020-12-10 ENCOUNTER — CONSULT (OUTPATIENT)
Dept: SLEEP MEDICINE | Facility: HOSPITAL | Age: 55
End: 2020-12-10

## 2020-12-10 VITALS
WEIGHT: 156 LBS | OXYGEN SATURATION: 98 % | DIASTOLIC BLOOD PRESSURE: 101 MMHG | HEART RATE: 62 BPM | SYSTOLIC BLOOD PRESSURE: 178 MMHG | BODY MASS INDEX: 25.07 KG/M2 | HEIGHT: 66 IN

## 2020-12-10 DIAGNOSIS — R03.0 ELEVATED BLOOD PRESSURE READING: ICD-10-CM

## 2020-12-10 DIAGNOSIS — F51.12 INSUFFICIENT SLEEP SYNDROME: ICD-10-CM

## 2020-12-10 DIAGNOSIS — G47.10 HYPERSOMNOLENCE: Primary | ICD-10-CM

## 2020-12-10 PROCEDURE — 99204 OFFICE O/P NEW MOD 45 MIN: CPT | Performed by: INTERNAL MEDICINE

## 2020-12-10 NOTE — PROGRESS NOTES
New Sleep Patient Office Visit      Patient Name: Karthik Ibrahim    Referring Physician: Abram Adams MD    Chief Complaint:    Chief Complaint   Patient presents with   • Sleeping Problem       History of Present Illness: Karthik Ibrahim is a 55 y.o. female who is here today to establish care with Sleep Medicine.     55-year-old female with past medical history of anxiety, depression presenting for initial evaluation for sleep clinic evaluation.  Patient states that she is referred by her cardiologist as she is undergoing work-up for elevated blood pressure and they were concerned about underlying sleep apnea.  Patient states that she has poor sleep going on for long time.  She works as a  but currently due to COVID-19 pandemic she is not working and her sleep cycle is variable.  She generally goes to sleep around midnight and wake up time is around 5:00 in the morning.  When she is working she wakes up around 4:30 in the morning.  However, she is not sleeping through the night as she is getting up a lot to use the restroom.  She also has restless sleep and tossing and turning quite a bit.  When she wakes up she is not feeling rested and continues to feel tired throughout the day.  She also has mental fogginess throughout the day as well.  She has been told that she snores and stops breathing in her sleep.  Occasionally she has woken herself up due to snoring.  She does wake up with dry mouth and has constant headaches as well.  She has decreased concentration during the daytime and difficulty focusing.  She denies any restless leg symptoms but has noticed occasional jerking during sleep.  No other parasomnias mentioned.  She denies any orthopnea or PND symptoms at night.  Occasional leg edema.  Denies any heartburn or reflux symptoms at nighttime.  Patient's normal routine before going to bed is watching TV, working on computer.  She does drink 2 glasses of wine at suppertime.  No  history of smoking.  No illicit drug use.  She does drink green tea at night which is switch to decaffeinated now.  She does drink 4 diet Cokes during the day.  Patient works as a .  Denies any problem with sleep-related accidents or near miss accidents due to sleep while on the job.     Patient has states that she has been undergoing work-up for elevated blood pressure.  Recently she was put on propranolol to try to control her anxiety as she is monitoring her blood pressure at home and it is only episodically elevated.  Patient did not take her medicine today and her blood pressure is significantly elevated.  We did review that with her.  She will take her medicine after going home.  Also advised to crosscheck her home blood pressure machine with our office machines to make sure she is getting the correct reading from her home machine.    Further sleep history is as below.    [place] Scale: 8/24    Estimated average amount of sleep per night: 4-5 h  Number of times  she wakes up at night: 2-3  Difficulty falling back asleep: yes  It usually takes 10 minutes to go to sleep.  She feels sleepy upon waking up: yes  Rotating or night shift work: no    Drowsiness/Sleepiness:  She exhibits the following:  excessive daytime sleepiness  excessive daytime fatigue  falls asleep watching TV    Snoring/Breathing:  She exhibits the following:  loud snoring  awoken with dry mouth  quits breathing at night  morning headaches    Reflux:  She describes the following:  None    Narcolepsy:  She exhibits the following:  none    RLS/PLMs:  She describes the following:  none    Insomnia:  She describes the following:  frequent awakenings  restless sleep    Parasomnia:  She exhibits the following:  None    Weight:  Weight change in the last year:  Slight gain      Subjective      Review of Systems:   Review of Systems   Constitutional: Positive for fatigue.   HENT: Negative.    Eyes: Positive for visual disturbance.    Respiratory: Negative.    Cardiovascular: Negative.    Gastrointestinal: Negative.    Endocrine: Negative.    Musculoskeletal: Positive for neck pain.   Skin: Negative.    Allergic/Immunologic: Positive for environmental allergies.   Neurological: Positive for headache.   Hematological: Negative.    Psychiatric/Behavioral: Positive for sleep disturbance.   All other systems reviewed and are negative.      Past Medical History:   Past Medical History:   Diagnosis Date   • Family Hx of osteoporosis MGM/osteopenia mother/patient normal DEXA age 51 2/15/2018   • Headache    • Hyperplastic colon polyp 2011 2/15/2018   • Hypertension        Past Surgical History:   Past Surgical History:   Procedure Laterality Date   • CHOLECYSTECTOMY     • COLONOSCOPY W/ BIOPSIES  2011 and 2014    Hyperplastic 2014   • SKIN CANCER EXCISION      facial moles removed   • TUBAL ABDOMINAL LIGATION     • WISDOM TOOTH EXTRACTION         Family History:   Family History   Problem Relation Age of Onset   • Ovarian cancer Maternal Grandmother 70   • Osteoporosis Maternal Grandmother    • Kidney disease Maternal Grandmother    • Skin cancer Maternal Grandmother    • Colon cancer Maternal Aunt    • Colon cancer Paternal Grandfather    • Osteoporosis Mother    • Hepatitis Father    • Lung cancer Father 57        smoker   • Heart disease Maternal Grandfather    • BRCA 1/2 Neg Hx    • Breast cancer Neg Hx    • Endometrial cancer Neg Hx        Social History:   Social History     Socioeconomic History   • Marital status:      Spouse name: Not on file   • Number of children: Not on file   • Years of education: Not on file   • Highest education level: Not on file   Tobacco Use   • Smoking status: Former Smoker     Quit date: 2010     Years since quitting: 10.9   • Smokeless tobacco: Never Used   Substance and Sexual Activity   • Alcohol use: Yes     Alcohol/week: 14.0 standard drinks     Types: 14 Glasses of wine per week     Frequency: 4 or  "more times a week     Binge frequency: Never     Comment: suggest 10 per week max   • Drug use: No   • Sexual activity: Yes     Partners: Male     Birth control/protection: Surgical       Medications:     Current Outpatient Medications:   •  diclofenac (VOLTAREN) 75 MG EC tablet, Take 1 tablet by mouth 2 (Two) Times a Day As Needed (moderate pain). Take with food., Disp: 60 tablet, Rfl: 2  •  FLUoxetine (PROzac) 20 MG capsule, Take 1 capsule by mouth Daily., Disp: 30 capsule, Rfl: 2  •  methocarbamol (ROBAXIN) 750 MG tablet, Take 1/2-1 tablet by mouth 4 times daily as needed for muscle spasms., Disp: 60 tablet, Rfl: 2  •  propranolol (INDERAL) 20 MG tablet, Take 1/2 to 1 tablet by mouth 3 times a day as needed for anxiety., Disp: 60 tablet, Rfl: 2  •  etonogestrel-ethinyl estradiol (NuvaRing) 0.12-0.015 MG/24HR vaginal ring, Insert 1 each into the vagina Every 28 (Twenty-Eight) Days. Use intravaginally as directed, Disp: 1 each, Rfl: 11  •  ketoconazole (NIZORAL) 2 % cream, , Disp: , Rfl:   •  methylPREDNISolone (Medrol) 4 MG dose pack, Take as directed on package instructions., Disp: 21 tablet, Rfl: 0    Allergies:   No Known Allergies    Objective     Physical Exam:  Vital Signs:   Vitals:    12/10/20 0937   BP: (!) 178/101   Pulse: 62   SpO2: 98%   Weight: 70.8 kg (156 lb)   Height: 167.6 cm (66\")       Physical Exam  Vitals signs and nursing note reviewed.   Constitutional:       General: She is not in acute distress.     Appearance: She is well-developed. She is not diaphoretic.   HENT:      Head: Normocephalic and atraumatic.      Comments: Mallampati 3 airway with redundant soft palate     Nose: Nose normal.      Mouth/Throat:      Pharynx: No oropharyngeal exudate.   Eyes:      General: No scleral icterus.        Right eye: No discharge.         Left eye: No discharge.      Pupils: Pupils are equal, round, and reactive to light.   Neck:      Musculoskeletal: Neck supple.      Thyroid: No thyromegaly.      " Trachea: No tracheal deviation.   Cardiovascular:      Rate and Rhythm: Normal rate and regular rhythm.      Heart sounds: Normal heart sounds. No murmur. No friction rub. No gallop.    Pulmonary:      Effort: Pulmonary effort is normal. No respiratory distress.      Breath sounds: Normal breath sounds. No stridor. No wheezing or rales.   Abdominal:      General: There is no distension.      Palpations: Abdomen is soft.   Musculoskeletal:         General: No swelling or tenderness.      Comments: Clubbing: none   Neurological:      Mental Status: She is alert and oriented to person, place, and time.      Cranial Nerves: No cranial nerve deficit.      Coordination: Coordination normal.   Psychiatric:         Behavior: Behavior normal.         Thought Content: Thought content normal.         Judgment: Judgment normal.         Results Review:   I reviewed the patient's new clinical results.     Lab Results   Component Value Date    TSH 1.150 07/22/2020         Assessment / Plan      Assessment:   Problem List Items Addressed This Visit     None      Visit Diagnoses     Hypersomnolence    -  Primary    Relevant Orders    Home Sleep Study    Elevated blood pressure reading        Insufficient sleep syndrome                Plan:   1.  Discussed with patient that she has to set of problems going on with her sleep.  1 is insufficient sleep.  Part of it is behaviorally induced.  She does poor sleep hygiene.  I spent good deal of time talking about that.  Advised her to not watch TV close to bedtime.  Also advised not to work on computer as that can impact her nighttime sleep.  Advised to read a book or do some puzzles something nonstimulating so that does not interfere with his nighttime sleep.  Also advised to increase her sleep time by another hour or so and see if that helps her to be more functional during the daytime and less foggy.  She verbalized understanding of these recommendations.  If she has trouble going to bed  earlier then she could take 2 to 3 mg of melatonin over-the-counter to see if we can improve her sleep onset issues and hopefully have her more increased sleep time.  She will attempt these techniques and see where we go from here.    2.  We discussed at length about possibility of sleep apnea given upper airway normalities, snoring, witnessed apneas.  Patient is feeling fatigued.  She is in a high risk profession with a .  We will proceed with further diagnostic testing to evaluate for sleep disordered breathing.  Side effects of untreated sleep apnea including cardiovascular, neurologic and metabolic side effects reviewed.  Discussed home study versus facility based testing.  The caveat of missing mild sleep apnea with home-based testing reviewed.  Patient is amenable to undergoing home testing at this point.  We will go ahead and set her up with that.  If home study is nondiagnostic then will consider in lab study.    3.  Patient does have elevated blood pressure.  It has been running high for last couple of visits.  Patient has not taken her propranolol today.  She will go home and take it.  Advised to monitor her blood pressure closely at home.  Discussed that this elevated blood pressure is likely not secondary to underlying sleep disordered breathing as we do not see this high blood pressure with untreated sleep apnea.  She may have underlying essential hypertension and she will need to be treated in the meantime telemetry work-up.  She will be in touch with her primary care provider regarding the same.  We spent time talking about dietary modification such as reducing salt intake.    4.  Continue with exercise routine.  Further weight gain would not be advisable and work towards maintaining weight currently.  Increasing weight gain on sleep apnea reviewed as well.    Patient had no further questions at this point.  We will follow her after sleep study to further results and discuss further  management options.  45 minutes spent in visit, reviewing things, discussion and counseling with motivators and time spent in face-to-face counseling on improving sleep hygiene, further diagnostic testing.    Follow Up:   Follow up after HST    Discussed plan of care in detail with patient today. Patient verbally understands and agrees.        Gaurav Ortiz MD  Pulmonary Critical Care and Sleep Medicine      Please note that portions of this note may have been completed with a voice recognition program. Efforts were made to edit the dictations, but occasionally words are mistranscribed.

## 2020-12-30 ENCOUNTER — OFFICE VISIT (OUTPATIENT)
Dept: INTERNAL MEDICINE | Facility: CLINIC | Age: 55
End: 2020-12-30

## 2020-12-30 VITALS
BODY MASS INDEX: 26.49 KG/M2 | HEART RATE: 72 BPM | TEMPERATURE: 97.3 F | HEIGHT: 65 IN | DIASTOLIC BLOOD PRESSURE: 100 MMHG | WEIGHT: 159 LBS | SYSTOLIC BLOOD PRESSURE: 160 MMHG

## 2020-12-30 DIAGNOSIS — I10 ESSENTIAL HYPERTENSION: Primary | ICD-10-CM

## 2020-12-30 PROCEDURE — 99213 OFFICE O/P EST LOW 20 MIN: CPT | Performed by: INTERNAL MEDICINE

## 2020-12-30 RX ORDER — OLMESARTAN MEDOXOMIL 20 MG/1
20 TABLET ORAL NIGHTLY
Qty: 30 TABLET | Refills: 5 | Status: SHIPPED | OUTPATIENT
Start: 2020-12-30 | End: 2021-06-10 | Stop reason: SDUPTHER

## 2020-12-30 NOTE — PROGRESS NOTES
Patient is a 55 y.o. female who is here for a follow up of abnormal liver enzymes.  Chief Complaint   Patient presents with   • Follow-up     abnromal liver enzymes         HPI:    Here for mgmt of elevated BP.  Had ECHO back in 2017 showing mild concentric LVH.  Has frequent HAs.  Was tried on BB but does not take on regular basis.  Some anxiety when her BP is elevated.  No ankle edema.    Menses are nonexistent since 8/20.      History:     Patient Active Problem List   Diagnosis   • Menstrual migraine without status migrainosus, not intractable   • Abnormal liver enzymes   • Routine general medical examination at a health care facility   • Essential hypertension       Past Medical History:   Diagnosis Date   • Family Hx of osteoporosis MGM/osteopenia mother/patient normal DEXA age 51 2/15/2018   • Headache    • Hyperplastic colon polyp 2011 2/15/2018   • Hypertension        Past Surgical History:   Procedure Laterality Date   • CHOLECYSTECTOMY     • COLONOSCOPY W/ BIOPSIES  2011 and 2014    Hyperplastic 2014   • SKIN CANCER EXCISION      facial moles removed   • TUBAL ABDOMINAL LIGATION     • WISDOM TOOTH EXTRACTION         Current Outpatient Medications on File Prior to Visit   Medication Sig   • FLUoxetine (PROzac) 20 MG capsule Take 1 capsule by mouth Daily.   • ketoconazole (NIZORAL) 2 % cream    • methocarbamol (ROBAXIN) 750 MG tablet Take 1/2-1 tablet by mouth 4 times daily as needed for muscle spasms.   • propranolol (INDERAL) 20 MG tablet Take 1/2 to 1 tablet by mouth 3 times a day as needed for anxiety.   • diclofenac (VOLTAREN) 75 MG EC tablet Take 1 tablet by mouth 2 (Two) Times a Day As Needed (moderate pain). Take with food.   • [DISCONTINUED] etonogestrel-ethinyl estradiol (NuvaRing) 0.12-0.015 MG/24HR vaginal ring Insert 1 each into the vagina Every 28 (Twenty-Eight) Days. Use intravaginally as directed   • [DISCONTINUED] methylPREDNISolone (Medrol) 4 MG dose pack Take as directed on package  instructions.     No current facility-administered medications on file prior to visit.        Family History   Problem Relation Age of Onset   • Ovarian cancer Maternal Grandmother 70   • Osteoporosis Maternal Grandmother    • Kidney disease Maternal Grandmother    • Skin cancer Maternal Grandmother    • Colon cancer Maternal Aunt    • Colon cancer Paternal Grandfather    • Osteoporosis Mother    • Hepatitis Father    • Lung cancer Father 57        smoker   • Heart disease Maternal Grandfather    • BRCA 1/2 Neg Hx    • Breast cancer Neg Hx    • Endometrial cancer Neg Hx        Social History     Socioeconomic History   • Marital status:      Spouse name: Not on file   • Number of children: Not on file   • Years of education: Not on file   • Highest education level: Not on file   Tobacco Use   • Smoking status: Former Smoker     Quit date:      Years since quittin.0   • Smokeless tobacco: Never Used   Substance and Sexual Activity   • Alcohol use: Yes     Alcohol/week: 14.0 standard drinks     Types: 14 Glasses of wine per week     Frequency: 4 or more times a week     Binge frequency: Never     Comment: suggest 10 per week max   • Drug use: No   • Sexual activity: Yes     Partners: Male     Birth control/protection: Surgical         Review of Systems   Constitutional: Negative for chills, fever and unexpected weight change.   HENT: Negative for congestion, ear pain, hearing loss, rhinorrhea, sinus pressure, sore throat and trouble swallowing.    Eyes: Negative for discharge and itching.   Respiratory: Negative for cough, chest tightness and shortness of breath.    Cardiovascular: Negative for chest pain, palpitations and leg swelling.   Gastrointestinal: Negative for abdominal pain, blood in stool, constipation, diarrhea and vomiting.        2018 colonoscopy   Endocrine: Negative for polydipsia and polyuria.   Genitourinary: Negative for difficulty urinating, dysuria, enuresis, frequency, hematuria  "and urgency.        12/20 mammogram  Menses in 8/20   Musculoskeletal: Positive for arthralgias. Negative for gait problem and joint swelling.   Skin: Negative for rash and wound.   Allergic/Immunologic: Negative for immunocompromised state.   Neurological: Positive for headaches. Negative for dizziness, syncope, weakness, light-headedness and numbness.   Hematological: Does not bruise/bleed easily.   Psychiatric/Behavioral: Negative for behavioral problems and dysphoric mood. The patient is not nervous/anxious.        /100   Pulse 72   Temp 97.3 °F (36.3 °C) (Infrared)   Ht 164.5 cm (64.76\")   Wt 72.1 kg (159 lb)   BMI 26.65 kg/m²       Physical Exam  Constitutional:       Appearance: Normal appearance. She is well-developed.   HENT:      Head: Normocephalic and atraumatic.      Right Ear: External ear normal.      Left Ear: External ear normal.      Nose: Nose normal.      Mouth/Throat:      Mouth: Mucous membranes are moist.      Pharynx: Oropharynx is clear.   Eyes:      Extraocular Movements: Extraocular movements intact.      Conjunctiva/sclera: Conjunctivae normal.      Pupils: Pupils are equal, round, and reactive to light.   Neck:      Musculoskeletal: Normal range of motion and neck supple.   Cardiovascular:      Rate and Rhythm: Normal rate and regular rhythm.      Heart sounds: Normal heart sounds.   Pulmonary:      Effort: Pulmonary effort is normal.      Breath sounds: Normal breath sounds.   Abdominal:      General: Bowel sounds are normal.      Palpations: Abdomen is soft.   Musculoskeletal: Normal range of motion.   Lymphadenopathy:      Cervical: No cervical adenopathy.   Skin:     General: Skin is warm and dry.   Neurological:      General: No focal deficit present.      Mental Status: She is alert and oriented to person, place, and time.   Psychiatric:         Mood and Affect: Mood normal.         Behavior: Behavior normal.         Thought Content: Thought content normal. "         Procedure:      Discussion/Summary:    HTN with mild LVH-start ARB  High risk med-BMP in 2 weeks    Current Outpatient Medications:   •  FLUoxetine (PROzac) 20 MG capsule, Take 1 capsule by mouth Daily., Disp: 30 capsule, Rfl: 2  •  ketoconazole (NIZORAL) 2 % cream, , Disp: , Rfl:   •  methocarbamol (ROBAXIN) 750 MG tablet, Take 1/2-1 tablet by mouth 4 times daily as needed for muscle spasms., Disp: 60 tablet, Rfl: 2  •  propranolol (INDERAL) 20 MG tablet, Take 1/2 to 1 tablet by mouth 3 times a day as needed for anxiety., Disp: 60 tablet, Rfl: 2  •  diclofenac (VOLTAREN) 75 MG EC tablet, Take 1 tablet by mouth 2 (Two) Times a Day As Needed (moderate pain). Take with food., Disp: 60 tablet, Rfl: 2  •  olmesartan (Benicar) 20 MG tablet, Take 1 tablet by mouth Every Night., Disp: 30 tablet, Rfl: 5        Diagnoses and all orders for this visit:    1. Essential hypertension (Primary)  -     olmesartan (Benicar) 20 MG tablet; Take 1 tablet by mouth Every Night.  Dispense: 30 tablet; Refill: 5

## 2021-01-08 DIAGNOSIS — R03.0 ELEVATED BLOOD PRESSURE READING: ICD-10-CM

## 2021-01-08 DIAGNOSIS — F41.9 ANXIETY: ICD-10-CM

## 2021-01-08 RX ORDER — PROPRANOLOL HYDROCHLORIDE 20 MG/1
TABLET ORAL
Qty: 60 TABLET | Refills: 11 | Status: SHIPPED | OUTPATIENT
Start: 2021-01-08 | End: 2021-11-18

## 2021-01-12 ENCOUNTER — LAB (OUTPATIENT)
Dept: LAB | Facility: HOSPITAL | Age: 56
End: 2021-01-12

## 2021-01-12 ENCOUNTER — OFFICE VISIT (OUTPATIENT)
Dept: INTERNAL MEDICINE | Facility: CLINIC | Age: 56
End: 2021-01-12

## 2021-01-12 ENCOUNTER — PRIOR AUTHORIZATION (OUTPATIENT)
Dept: INTERNAL MEDICINE | Facility: CLINIC | Age: 56
End: 2021-01-12

## 2021-01-12 VITALS
TEMPERATURE: 96.8 F | HEIGHT: 65 IN | DIASTOLIC BLOOD PRESSURE: 80 MMHG | SYSTOLIC BLOOD PRESSURE: 126 MMHG | WEIGHT: 159 LBS | HEART RATE: 68 BPM | BODY MASS INDEX: 26.49 KG/M2

## 2021-01-12 DIAGNOSIS — G43.829 MENSTRUAL MIGRAINE WITHOUT STATUS MIGRAINOSUS, NOT INTRACTABLE: ICD-10-CM

## 2021-01-12 DIAGNOSIS — I10 ESSENTIAL HYPERTENSION: Primary | ICD-10-CM

## 2021-01-12 DIAGNOSIS — R74.8 ABNORMAL LIVER ENZYMES: ICD-10-CM

## 2021-01-12 LAB
ALBUMIN SERPL-MCNC: 4.3 G/DL (ref 3.5–5.2)
ALBUMIN/GLOB SERPL: 2 G/DL
ALP SERPL-CCNC: 83 U/L (ref 39–117)
ALT SERPL W P-5'-P-CCNC: 57 U/L (ref 1–33)
ANION GAP SERPL CALCULATED.3IONS-SCNC: 9.4 MMOL/L (ref 5–15)
AST SERPL-CCNC: 60 U/L (ref 1–32)
BILIRUB SERPL-MCNC: 0.3 MG/DL (ref 0–1.2)
BUN SERPL-MCNC: 12 MG/DL (ref 6–20)
BUN/CREAT SERPL: 16.4 (ref 7–25)
CALCIUM SPEC-SCNC: 8.9 MG/DL (ref 8.6–10.5)
CHLORIDE SERPL-SCNC: 102 MMOL/L (ref 98–107)
CO2 SERPL-SCNC: 26.6 MMOL/L (ref 22–29)
CREAT SERPL-MCNC: 0.73 MG/DL (ref 0.57–1)
GFR SERPL CREATININE-BSD FRML MDRD: 83 ML/MIN/1.73
GLOBULIN UR ELPH-MCNC: 2.2 GM/DL
GLUCOSE SERPL-MCNC: 90 MG/DL (ref 65–99)
POTASSIUM SERPL-SCNC: 4.3 MMOL/L (ref 3.5–5.2)
PROT SERPL-MCNC: 6.5 G/DL (ref 6–8.5)
SODIUM SERPL-SCNC: 138 MMOL/L (ref 136–145)

## 2021-01-12 PROCEDURE — 80053 COMPREHEN METABOLIC PANEL: CPT | Performed by: INTERNAL MEDICINE

## 2021-01-12 PROCEDURE — 99213 OFFICE O/P EST LOW 20 MIN: CPT | Performed by: INTERNAL MEDICINE

## 2021-01-12 NOTE — PROGRESS NOTES
Patient is a 55 y.o. female who is here for a follow up of hypertension.  Chief Complaint   Patient presents with   • Hypertension         HPI:    Here for mgmt of HTN.  Tolerating the ARB without dizziness or lightheadedness. No ankle edema.  Occ HAs.      History:     Patient Active Problem List   Diagnosis   • Menstrual migraine without status migrainosus, not intractable   • Abnormal liver enzymes   • Routine general medical examination at a health care facility   • Essential hypertension       Past Medical History:   Diagnosis Date   • Family Hx of osteoporosis MGM/osteopenia mother/patient normal DEXA age 51 2/15/2018   • Headache    • Hyperplastic colon polyp 2011 2/15/2018   • Hypertension        Past Surgical History:   Procedure Laterality Date   • CHOLECYSTECTOMY     • COLONOSCOPY W/ BIOPSIES  2011 and 2014    Hyperplastic 2014   • SKIN CANCER EXCISION      facial moles removed   • TUBAL ABDOMINAL LIGATION     • WISDOM TOOTH EXTRACTION         Current Outpatient Medications on File Prior to Visit   Medication Sig   • diclofenac (VOLTAREN) 75 MG EC tablet Take 1 tablet by mouth 2 (Two) Times a Day As Needed (moderate pain). Take with food.   • FLUoxetine (PROzac) 20 MG capsule Take 1 capsule by mouth Daily.   • ketoconazole (NIZORAL) 2 % cream    • methocarbamol (ROBAXIN) 750 MG tablet Take 1/2-1 tablet by mouth 4 times daily as needed for muscle spasms.   • olmesartan (Benicar) 20 MG tablet Take 1 tablet by mouth Every Night.   • propranolol (INDERAL) 20 MG tablet TAKE 1/2 TO 1 TABLET BY MOUTH THREE TIMES A DAY AS NEEDED FOR ANXIETY     No current facility-administered medications on file prior to visit.        Family History   Problem Relation Age of Onset   • Ovarian cancer Maternal Grandmother 70   • Osteoporosis Maternal Grandmother    • Kidney disease Maternal Grandmother    • Skin cancer Maternal Grandmother    • Colon cancer Maternal Aunt    • Colon cancer Paternal Grandfather    • Osteoporosis  Mother    • Hepatitis Father    • Lung cancer Father 57        smoker   • Heart disease Maternal Grandfather    • BRCA 1/2 Neg Hx    • Breast cancer Neg Hx    • Endometrial cancer Neg Hx        Social History     Socioeconomic History   • Marital status:      Spouse name: Not on file   • Number of children: Not on file   • Years of education: Not on file   • Highest education level: Not on file   Tobacco Use   • Smoking status: Former Smoker     Quit date:      Years since quittin.0   • Smokeless tobacco: Never Used   Substance and Sexual Activity   • Alcohol use: Yes     Alcohol/week: 14.0 standard drinks     Types: 14 Glasses of wine per week     Frequency: 4 or more times a week     Binge frequency: Never     Comment: suggest 10 per week max   • Drug use: No   • Sexual activity: Yes     Partners: Male     Birth control/protection: Surgical         Review of Systems   Constitutional: Negative for chills, fever and unexpected weight change.   HENT: Negative for congestion, ear pain, hearing loss, rhinorrhea, sinus pressure, sore throat and trouble swallowing.    Eyes: Negative for discharge and itching.   Respiratory: Negative for cough, chest tightness and shortness of breath.    Cardiovascular: Negative for chest pain, palpitations and leg swelling.   Gastrointestinal: Negative for abdominal pain, blood in stool, constipation, diarrhea and vomiting.        2018 colonoscopy   Endocrine: Negative for polydipsia and polyuria.   Genitourinary: Negative for difficulty urinating, dysuria, enuresis, frequency, hematuria and urgency.         mammogram  Menses in    Musculoskeletal: Positive for arthralgias. Negative for gait problem and joint swelling.   Skin: Negative for rash and wound.   Allergic/Immunologic: Negative for immunocompromised state.   Neurological: Positive for headaches. Negative for dizziness, syncope, weakness, light-headedness and numbness.   Hematological: Does not  "bruise/bleed easily.   Psychiatric/Behavioral: Negative for behavioral problems and dysphoric mood. The patient is not nervous/anxious.        /80   Pulse 68   Temp 96.8 °F (36 °C) (Infrared)   Ht 164.5 cm (64.76\")   Wt 72.1 kg (159 lb)   BMI 26.65 kg/m²       Physical Exam  Constitutional:       Appearance: Normal appearance. She is well-developed.   HENT:      Head: Normocephalic and atraumatic.      Right Ear: External ear normal.      Left Ear: External ear normal.      Nose: Nose normal.      Mouth/Throat:      Mouth: Mucous membranes are moist.      Pharynx: Oropharynx is clear.   Eyes:      Extraocular Movements: Extraocular movements intact.      Conjunctiva/sclera: Conjunctivae normal.      Pupils: Pupils are equal, round, and reactive to light.   Neck:      Musculoskeletal: Normal range of motion and neck supple.   Cardiovascular:      Rate and Rhythm: Normal rate and regular rhythm.      Heart sounds: Normal heart sounds.   Pulmonary:      Effort: Pulmonary effort is normal.      Breath sounds: Normal breath sounds.   Abdominal:      General: Bowel sounds are normal.      Palpations: Abdomen is soft.   Musculoskeletal: Normal range of motion.   Lymphadenopathy:      Cervical: No cervical adenopathy.   Skin:     General: Skin is warm and dry.   Neurological:      General: No focal deficit present.      Mental Status: She is alert and oriented to person, place, and time.   Psychiatric:         Mood and Affect: Mood normal.         Behavior: Behavior normal.         Thought Content: Thought content normal.         Procedure:      Discussion/Summary:    HTN with mild LVH-cont ARB  High risk med-BMP today at goal  Migraine-should improve with better BP control  Hx of abnormal lft-recheck    1/12 labs noted and dw patient       Current Outpatient Medications:   •  diclofenac (VOLTAREN) 75 MG EC tablet, Take 1 tablet by mouth 2 (Two) Times a Day As Needed (moderate pain). Take with food., Disp: 60 " tablet, Rfl: 2  •  FLUoxetine (PROzac) 20 MG capsule, Take 1 capsule by mouth Daily., Disp: 30 capsule, Rfl: 2  •  ketoconazole (NIZORAL) 2 % cream, , Disp: , Rfl:   •  methocarbamol (ROBAXIN) 750 MG tablet, Take 1/2-1 tablet by mouth 4 times daily as needed for muscle spasms., Disp: 60 tablet, Rfl: 2  •  olmesartan (Benicar) 20 MG tablet, Take 1 tablet by mouth Every Night., Disp: 30 tablet, Rfl: 5  •  propranolol (INDERAL) 20 MG tablet, TAKE 1/2 TO 1 TABLET BY MOUTH THREE TIMES A DAY AS NEEDED FOR ANXIETY, Disp: 60 tablet, Rfl: 11        Diagnoses and all orders for this visit:    1. Essential hypertension (Primary)  -     Comprehensive Metabolic Panel    2. Menstrual migraine without status migrainosus, not intractable    3. Abnormal liver enzymes

## 2021-02-05 DIAGNOSIS — F41.9 ANXIETY: ICD-10-CM

## 2021-02-05 DIAGNOSIS — F33.1 MODERATE EPISODE OF RECURRENT MAJOR DEPRESSIVE DISORDER (HCC): ICD-10-CM

## 2021-02-05 RX ORDER — FLUOXETINE HYDROCHLORIDE 20 MG/1
CAPSULE ORAL
Qty: 90 CAPSULE | Refills: 3 | Status: SHIPPED | OUTPATIENT
Start: 2021-02-05 | End: 2022-01-13

## 2021-06-09 ENCOUNTER — TELEPHONE (OUTPATIENT)
Dept: INTERNAL MEDICINE | Facility: CLINIC | Age: 56
End: 2021-06-09

## 2021-06-09 NOTE — TELEPHONE ENCOUNTER
Pt called stating the last few days she is SOB and pressure on chest that comes and goes.  I advised pt to go to ER but she feels she is fine.  I went over with pt several times that if she gets worse (develops nausea, CP, pain down left arm. Etc) she is to go to ER.  She said she would see us tomrrow (I worked pt in)

## 2021-06-10 ENCOUNTER — OFFICE VISIT (OUTPATIENT)
Dept: INTERNAL MEDICINE | Facility: CLINIC | Age: 56
End: 2021-06-10

## 2021-06-10 VITALS
WEIGHT: 156 LBS | DIASTOLIC BLOOD PRESSURE: 90 MMHG | OXYGEN SATURATION: 98 % | BODY MASS INDEX: 25.99 KG/M2 | HEIGHT: 65 IN | HEART RATE: 60 BPM | TEMPERATURE: 96.9 F | SYSTOLIC BLOOD PRESSURE: 130 MMHG

## 2021-06-10 DIAGNOSIS — I10 ESSENTIAL HYPERTENSION: Primary | ICD-10-CM

## 2021-06-10 PROCEDURE — 93000 ELECTROCARDIOGRAM COMPLETE: CPT | Performed by: INTERNAL MEDICINE

## 2021-06-10 PROCEDURE — 99213 OFFICE O/P EST LOW 20 MIN: CPT | Performed by: INTERNAL MEDICINE

## 2021-06-10 RX ORDER — OLMESARTAN MEDOXOMIL 40 MG/1
40 TABLET ORAL NIGHTLY
Qty: 90 TABLET | Refills: 3 | Status: SHIPPED | OUTPATIENT
Start: 2021-06-10 | End: 2022-07-26

## 2021-06-10 NOTE — PROGRESS NOTES
"Patient is a 55 y.o. female who is here for a follow up of hypertension.  Chief Complaint   Patient presents with   • Hypertension         HPI:    Here for mgmt of HTN.  When she wakes up her BP is in the 130//90s.  Feels a \"pressure\" when she wakes up.  No problems with exercise, ie running 3 miles and playing tennis.  Some increase hot flashes.  Has chronic HAs.  Compliant with med.  Taking otc NSAIDs for body aches.      History:     Patient Active Problem List   Diagnosis   • Menstrual migraine without status migrainosus, not intractable   • Abnormal liver enzymes   • Routine general medical examination at a health care facility   • Essential hypertension       Past Medical History:   Diagnosis Date   • Family Hx of osteoporosis MGM/osteopenia mother/patient normal DEXA age 51 2/15/2018   • Headache    • Hyperplastic colon polyp 2011 2/15/2018   • Hypertension        Past Surgical History:   Procedure Laterality Date   • CHOLECYSTECTOMY     • COLONOSCOPY W/ BIOPSIES  2011 and 2014    Hyperplastic 2014   • SKIN CANCER EXCISION      facial moles removed   • TUBAL ABDOMINAL LIGATION     • WISDOM TOOTH EXTRACTION         Current Outpatient Medications on File Prior to Visit   Medication Sig   • diclofenac (VOLTAREN) 75 MG EC tablet Take 1 tablet by mouth 2 (Two) Times a Day As Needed (moderate pain). Take with food.   • FLUoxetine (PROzac) 20 MG capsule TAKE ONE CAPSULE BY MOUTH DAILY   • ketoconazole (NIZORAL) 2 % cream    • methocarbamol (ROBAXIN) 750 MG tablet Take 1/2-1 tablet by mouth 4 times daily as needed for muscle spasms.   • propranolol (INDERAL) 20 MG tablet TAKE 1/2 TO 1 TABLET BY MOUTH THREE TIMES A DAY AS NEEDED FOR ANXIETY   • [DISCONTINUED] olmesartan (Benicar) 20 MG tablet Take 1 tablet by mouth Every Night.     No current facility-administered medications on file prior to visit.       Family History   Problem Relation Age of Onset   • Ovarian cancer Maternal Grandmother 70   • Osteoporosis " Maternal Grandmother    • Kidney disease Maternal Grandmother    • Skin cancer Maternal Grandmother    • Colon cancer Maternal Aunt    • Colon cancer Paternal Grandfather    • Osteoporosis Mother    • Hepatitis Father    • Lung cancer Father 57        smoker   • Heart disease Maternal Grandfather    • BRCA 1/2 Neg Hx    • Breast cancer Neg Hx    • Endometrial cancer Neg Hx        Social History     Socioeconomic History   • Marital status:      Spouse name: Not on file   • Number of children: Not on file   • Years of education: Not on file   • Highest education level: Not on file   Tobacco Use   • Smoking status: Former Smoker     Quit date:      Years since quittin.4   • Smokeless tobacco: Never Used   Substance and Sexual Activity   • Alcohol use: Yes     Alcohol/week: 14.0 standard drinks     Types: 14 Glasses of wine per week     Comment: suggest 10 per week max   • Drug use: No   • Sexual activity: Yes     Partners: Male     Birth control/protection: Surgical         Review of Systems   Constitutional: Negative for chills, fever and unexpected weight change.   HENT: Negative for congestion, ear pain, hearing loss, rhinorrhea, sinus pressure, sore throat and trouble swallowing.    Eyes: Negative for discharge and itching.   Respiratory: Negative for cough, chest tightness and shortness of breath.    Cardiovascular: Negative for chest pain, palpitations and leg swelling.   Gastrointestinal: Negative for abdominal pain, blood in stool, constipation, diarrhea and vomiting.        2018 colonoscopy   Endocrine: Negative for polydipsia and polyuria.   Genitourinary: Negative for difficulty urinating, dysuria, enuresis, frequency, hematuria and urgency.         mammogram  Menses in    Musculoskeletal: Positive for arthralgias. Negative for gait problem and joint swelling.   Skin: Negative for rash and wound.   Allergic/Immunologic: Negative for immunocompromised state.   Neurological: Positive  "for headaches. Negative for dizziness, syncope, weakness, light-headedness and numbness.   Hematological: Does not bruise/bleed easily.   Psychiatric/Behavioral: Negative for behavioral problems and dysphoric mood. The patient is not nervous/anxious.        /90   Pulse 60   Temp 96.9 °F (36.1 °C) (Infrared)   Ht 164.5 cm (64.76\")   Wt 70.8 kg (156 lb)   SpO2 98%   BMI 26.15 kg/m²       Physical Exam  Constitutional:       Appearance: Normal appearance. She is well-developed.   HENT:      Head: Normocephalic and atraumatic.      Right Ear: External ear normal.      Left Ear: External ear normal.      Nose: Nose normal.      Mouth/Throat:      Mouth: Mucous membranes are moist.      Pharynx: Oropharynx is clear.   Eyes:      Extraocular Movements: Extraocular movements intact.      Conjunctiva/sclera: Conjunctivae normal.      Pupils: Pupils are equal, round, and reactive to light.   Cardiovascular:      Rate and Rhythm: Normal rate and regular rhythm.      Heart sounds: Normal heart sounds.   Pulmonary:      Effort: Pulmonary effort is normal.      Breath sounds: Normal breath sounds.   Abdominal:      General: Bowel sounds are normal.      Palpations: Abdomen is soft.   Musculoskeletal:         General: Normal range of motion.      Cervical back: Normal range of motion and neck supple.   Lymphadenopathy:      Cervical: No cervical adenopathy.   Skin:     General: Skin is warm and dry.   Neurological:      General: No focal deficit present.      Mental Status: She is alert and oriented to person, place, and time.   Psychiatric:         Mood and Affect: Mood normal.         Behavior: Behavior normal.         Thought Content: Thought content normal.           Procedure:      ECG 12 Lead    Date/Time: 6/10/2021 12:32 PM  Performed by: Abram Adams MD  Authorized by: Abram Adams MD   Comparison: not compared with previous ECG   Previous ECG: no previous ECG available  Rhythm: sinus " bradycardia  Rate: bradycardic  Conduction: conduction normal  T Waves: T waves normal  QRS axis: normal  Other: no other findings    Clinical impression: normal ECG          Discussion/Summary:    HTN with mild LVH-increase ARB  Migraine-should improve with better BP control  Hx of abnormal lft-recheck normal     1/12 labs noted and dw patient    Current Outpatient Medications:   •  diclofenac (VOLTAREN) 75 MG EC tablet, Take 1 tablet by mouth 2 (Two) Times a Day As Needed (moderate pain). Take with food., Disp: 60 tablet, Rfl: 2  •  FLUoxetine (PROzac) 20 MG capsule, TAKE ONE CAPSULE BY MOUTH DAILY, Disp: 90 capsule, Rfl: 3  •  ketoconazole (NIZORAL) 2 % cream, , Disp: , Rfl:   •  methocarbamol (ROBAXIN) 750 MG tablet, Take 1/2-1 tablet by mouth 4 times daily as needed for muscle spasms., Disp: 60 tablet, Rfl: 2  •  olmesartan (Benicar) 40 MG tablet, Take 1 tablet by mouth Every Night., Disp: 90 tablet, Rfl: 3  •  propranolol (INDERAL) 20 MG tablet, TAKE 1/2 TO 1 TABLET BY MOUTH THREE TIMES A DAY AS NEEDED FOR ANXIETY, Disp: 60 tablet, Rfl: 11        Diagnoses and all orders for this visit:    1. Essential hypertension (Primary)  -     olmesartan (Benicar) 40 MG tablet; Take 1 tablet by mouth Every Night.  Dispense: 90 tablet; Refill: 3  -     ECG 12 Lead        Answers for HPI/ROS submitted by the patient on 6/10/2021  What is the primary reason for your visit?: High Blood Pressure

## 2021-06-27 DIAGNOSIS — I10 ESSENTIAL HYPERTENSION: ICD-10-CM

## 2021-06-28 RX ORDER — OLMESARTAN MEDOXOMIL 20 MG/1
TABLET ORAL
Qty: 90 TABLET | Refills: 3 | Status: SHIPPED | OUTPATIENT
Start: 2021-06-28 | End: 2021-08-20

## 2021-08-02 ENCOUNTER — TELEPHONE (OUTPATIENT)
Dept: INTERNAL MEDICINE | Facility: CLINIC | Age: 56
End: 2021-08-02

## 2021-08-02 NOTE — TELEPHONE ENCOUNTER
Pt called wanting to know if it was possible for her to get in earlier for a physical, pt was informed there were no openings and would like to speak with someone on the clinical staff, please advise.

## 2021-08-20 ENCOUNTER — OFFICE VISIT (OUTPATIENT)
Dept: OBSTETRICS AND GYNECOLOGY | Facility: CLINIC | Age: 56
End: 2021-08-20

## 2021-08-20 VITALS
BODY MASS INDEX: 27.49 KG/M2 | DIASTOLIC BLOOD PRESSURE: 82 MMHG | RESPIRATION RATE: 16 BRPM | SYSTOLIC BLOOD PRESSURE: 130 MMHG | WEIGHT: 164 LBS

## 2021-08-20 DIAGNOSIS — N95.1 MENOPAUSAL SYMPTOMS: ICD-10-CM

## 2021-08-20 DIAGNOSIS — Z01.419 ENCOUNTER FOR GYNECOLOGICAL EXAMINATION WITHOUT ABNORMAL FINDING: Primary | ICD-10-CM

## 2021-08-20 DIAGNOSIS — Z80.0 FAMILY HISTORY OF COLON CANCER: ICD-10-CM

## 2021-08-20 PROCEDURE — 99214 OFFICE O/P EST MOD 30 MIN: CPT | Performed by: NURSE PRACTITIONER

## 2021-08-20 PROCEDURE — 99396 PREV VISIT EST AGE 40-64: CPT | Performed by: NURSE PRACTITIONER

## 2021-08-20 RX ORDER — ESTROGEN,CON/M-PROGEST ACET 0.45-1.5MG
1 TABLET ORAL DAILY
Qty: 30 TABLET | Refills: 2 | Status: SHIPPED | OUTPATIENT
Start: 2021-08-20 | End: 2021-08-24 | Stop reason: CLARIF

## 2021-08-20 NOTE — PROGRESS NOTES
Annual Visit     Patient Name: Karthik Ibrahim  : 1965   MRN: [id number]   Care Team: Patient Care Team:  Abram Adams MD as PCP - General (Internal Medicine)    Chief Complaint:    Chief Complaint   Patient presents with   • Annual Exam   • Hot Flashes   • Weight Gain       HPI: Karthik Ibrahim is a 55 y.o. year old  presenting to be seen for her gynecologic exam.   Amenorrhea since AV last yr - no bldg at all   C/o very bothersome hot flashes, fatigue, brain fog, and diffuse joint pain   Would like to discuss HRT options   Former smoker   Hx HTN - well controlled with medication     Pap smear  WNL HPV negative     Mammogram 2020 birads 1     DEXA 2019 WNL     Colonoscopy 2018 WNL - rpt 10 yrs   Family hx of colon cancer     MGM with some type of GYN cancer - she thinks uterine but chart says ovarian         Subjective      /82   Resp 16   Wt 74.4 kg (164 lb)   BMI 27.49 kg/m²     BMI reviewed: Body mass index is 27.49 kg/m².      Objective     Physical Exam    Neuro: alert and oriented to person, place and time   General:  alert; cooperative; well developed; well nourished   Skin:  No suspicious lesions seen   Thyroid: normal to inspection and palpation   Lungs:  breathing is unlabored  clear to auscultation bilaterally   Heart:  regular rate and rhythm, S1, S2 normal, no murmur, click, rub or gallop  normal apical impulse   Breasts:  Examined in supine position  Symmetric without masses or skin dimpling  Nipples normal without inversion, lesions or discharge  There are no palpable axillary nodes   Abdomen: soft, non-tender; no masses  no umbilical or inguinal hernias are present  no hepato-splenomegaly   Pelvis: Clinical staff was present for exam  External genitalia:  normal appearance of the external genitalia including [place]th[place]'s and [place]'s glands.  :  urethral meatus normal;  Vaginal:  normal pink mucosa without prolapse or lesions.  Cervix:  normal  appearance.  Uterus:  normal size, shape and consistency.  Adnexa:  normal bimanual exam of the adnexa.  Rectal:  digital rectal exam not performed; anus visually normal appearing.         Assessment / Plan      Assessment  Problems Addressed This Visit    ICD-10-CM ICD-9-CM   1. Encounter for gynecological examination without abnormal finding  Z01.419 V72.31   2. Menopausal symptoms  N95.1 627.2   3. Family history of colon cancer  Z80.0 V16.0       Plan      Pap smear not indicated   Discussed monthly SBEs   She will call to schedule mammogram in December when due   Discussed HRT options and risks vs benefits   No C/Is to HRT - script for Prempro to pharmacy   Will start with middle dose so we can adjust as needed   She will monitor BP to ensure no increase with HRT - if so, she will let me know   F/u in 3 months - if she is doing well, I will send in script until annual   If having problems, we will discuss at a visit in 3 months   Must call with any PMB     Discussed calcium, 600 mg/ Vit. D, 400 IU daily; regular weight-bearing exercise    Colonoscopy UTD - stressed importance of this especially with family hx     She will check with mother re: [facility] u.sit hx   Number to  Ovarian cancer screening given and recommended annually     AV 1 yr         40 to 64: Counseling/Anticipatory Guidance Discussed: screenings and self-breast exam    Follow Up  Return for Annual physical.  Patient was given instructions and counseling regarding her condition or for health maintenance advice. Please see specific information pulled into the AVS if appropriate.     Mary Alice Ruelas, ALMA  August 20, 2021  08:33 EDT

## 2021-08-24 ENCOUNTER — TELEPHONE (OUTPATIENT)
Dept: OBSTETRICS AND GYNECOLOGY | Facility: CLINIC | Age: 56
End: 2021-08-24

## 2021-08-24 RX ORDER — ESTRADIOL 0.5 MG/1
0.5 TABLET ORAL DAILY
Qty: 30 TABLET | Refills: 2 | Status: SHIPPED | OUTPATIENT
Start: 2021-08-24 | End: 2021-11-18 | Stop reason: SDUPTHER

## 2021-08-24 RX ORDER — PROGESTERONE 100 MG/1
CAPSULE ORAL
Qty: 30 CAPSULE | Refills: 2 | Status: SHIPPED | OUTPATIENT
Start: 2021-08-24 | End: 2021-11-18 | Stop reason: SDUPTHER

## 2021-08-24 NOTE — TELEPHONE ENCOUNTER
prempro is not covered by her insurance, it is a coverage exclusion.  Can you send in another hormone?

## 2021-10-28 ENCOUNTER — TRANSCRIBE ORDERS (OUTPATIENT)
Dept: ADMINISTRATIVE | Facility: HOSPITAL | Age: 56
End: 2021-10-28

## 2021-10-28 DIAGNOSIS — Z12.31 VISIT FOR SCREENING MAMMOGRAM: Primary | ICD-10-CM

## 2021-11-09 ENCOUNTER — TELEPHONE (OUTPATIENT)
Dept: INTERNAL MEDICINE | Facility: CLINIC | Age: 56
End: 2021-11-09

## 2021-11-09 DIAGNOSIS — I10 ESSENTIAL HYPERTENSION: Primary | ICD-10-CM

## 2021-11-09 DIAGNOSIS — Z13.29 SCREENING FOR ENDOCRINE DISORDER: ICD-10-CM

## 2021-11-09 NOTE — TELEPHONE ENCOUNTER
Caller: Karthik Ibrahim    Relationship: Self    Best call back number: [phone]    What orders are you requesting (i.e. lab or imaging):  bloodwork for physical    In what timeframe would the patient need to come in: ASAP    Where will you receive your lab/imaging services:     Additional notes: Patient has an appointment for a physical on Monday 11/22/21 at 8am and would like an order for bloodwork put so they can go over at time of appointment.   Patient would like a call back to let them know when order has been put in and if it is fasting or not.

## 2021-11-11 ENCOUNTER — LAB (OUTPATIENT)
Dept: LAB | Facility: HOSPITAL | Age: 56
End: 2021-11-11

## 2021-11-11 DIAGNOSIS — I10 ESSENTIAL HYPERTENSION: ICD-10-CM

## 2021-11-11 LAB
ALBUMIN SERPL-MCNC: 4.7 G/DL (ref 3.5–5.2)
ALBUMIN/GLOB SERPL: 2 G/DL
ALP SERPL-CCNC: 76 U/L (ref 39–117)
ALT SERPL W P-5'-P-CCNC: 14 U/L (ref 1–33)
ANION GAP SERPL CALCULATED.3IONS-SCNC: 7.3 MMOL/L (ref 5–15)
AST SERPL-CCNC: 20 U/L (ref 1–32)
BASOPHILS # BLD AUTO: 0.02 10*3/MM3 (ref 0–0.2)
BASOPHILS NFR BLD AUTO: 0.4 % (ref 0–1.5)
BILIRUB SERPL-MCNC: 0.4 MG/DL (ref 0–1.2)
BUN SERPL-MCNC: 18 MG/DL (ref 6–20)
BUN/CREAT SERPL: 27.3 (ref 7–25)
CALCIUM SPEC-SCNC: 9.4 MG/DL (ref 8.6–10.5)
CHLORIDE SERPL-SCNC: 101 MMOL/L (ref 98–107)
CHOLEST SERPL-MCNC: 161 MG/DL (ref 0–200)
CO2 SERPL-SCNC: 28.7 MMOL/L (ref 22–29)
CREAT SERPL-MCNC: 0.66 MG/DL (ref 0.57–1)
DEPRECATED RDW RBC AUTO: 42.2 FL (ref 37–54)
EOSINOPHIL # BLD AUTO: 0.04 10*3/MM3 (ref 0–0.4)
EOSINOPHIL NFR BLD AUTO: 0.7 % (ref 0.3–6.2)
ERYTHROCYTE [DISTWIDTH] IN BLOOD BY AUTOMATED COUNT: 11.8 % (ref 12.3–15.4)
GFR SERPL CREATININE-BSD FRML MDRD: 93 ML/MIN/1.73
GLOBULIN UR ELPH-MCNC: 2.4 GM/DL
GLUCOSE SERPL-MCNC: 110 MG/DL (ref 65–99)
HCT VFR BLD AUTO: 41.6 % (ref 34–46.6)
HDLC SERPL-MCNC: 71 MG/DL (ref 40–60)
HGB BLD-MCNC: 14.4 G/DL (ref 12–15.9)
IMM GRANULOCYTES # BLD AUTO: 0.02 10*3/MM3 (ref 0–0.05)
IMM GRANULOCYTES NFR BLD AUTO: 0.4 % (ref 0–0.5)
LDLC SERPL CALC-MCNC: 78 MG/DL (ref 0–100)
LDLC/HDLC SERPL: 1.1 {RATIO}
LYMPHOCYTES # BLD AUTO: 1.87 10*3/MM3 (ref 0.7–3.1)
LYMPHOCYTES NFR BLD AUTO: 35 % (ref 19.6–45.3)
MCH RBC QN AUTO: 33.8 PG (ref 26.6–33)
MCHC RBC AUTO-ENTMCNC: 34.6 G/DL (ref 31.5–35.7)
MCV RBC AUTO: 97.7 FL (ref 79–97)
MONOCYTES # BLD AUTO: 0.38 10*3/MM3 (ref 0.1–0.9)
MONOCYTES NFR BLD AUTO: 7.1 % (ref 5–12)
NEUTROPHILS NFR BLD AUTO: 3.01 10*3/MM3 (ref 1.7–7)
NEUTROPHILS NFR BLD AUTO: 56.4 % (ref 42.7–76)
NRBC BLD AUTO-RTO: 0 /100 WBC (ref 0–0.2)
PLATELET # BLD AUTO: 249 10*3/MM3 (ref 140–450)
PMV BLD AUTO: 10.7 FL (ref 6–12)
POTASSIUM SERPL-SCNC: 5 MMOL/L (ref 3.5–5.2)
PROT SERPL-MCNC: 7.1 G/DL (ref 6–8.5)
RBC # BLD AUTO: 4.26 10*6/MM3 (ref 3.77–5.28)
SODIUM SERPL-SCNC: 137 MMOL/L (ref 136–145)
TRIGL SERPL-MCNC: 58 MG/DL (ref 0–150)
TSH SERPL DL<=0.05 MIU/L-ACNC: 1.15 UIU/ML (ref 0.27–4.2)
VLDLC SERPL-MCNC: 12 MG/DL (ref 5–40)
WBC # BLD AUTO: 5.34 10*3/MM3 (ref 3.4–10.8)

## 2021-11-11 PROCEDURE — 85025 COMPLETE CBC W/AUTO DIFF WBC: CPT

## 2021-11-11 PROCEDURE — 80053 COMPREHEN METABOLIC PANEL: CPT | Performed by: NURSE PRACTITIONER

## 2021-11-11 PROCEDURE — 36415 COLL VENOUS BLD VENIPUNCTURE: CPT

## 2021-11-11 PROCEDURE — 84443 ASSAY THYROID STIM HORMONE: CPT | Performed by: NURSE PRACTITIONER

## 2021-11-11 PROCEDURE — 80061 LIPID PANEL: CPT | Performed by: NURSE PRACTITIONER

## 2021-11-18 ENCOUNTER — LAB (OUTPATIENT)
Dept: LAB | Facility: HOSPITAL | Age: 56
End: 2021-11-18

## 2021-11-18 ENCOUNTER — OFFICE VISIT (OUTPATIENT)
Dept: OBSTETRICS AND GYNECOLOGY | Facility: CLINIC | Age: 56
End: 2021-11-18

## 2021-11-18 VITALS
RESPIRATION RATE: 16 BRPM | DIASTOLIC BLOOD PRESSURE: 80 MMHG | WEIGHT: 176 LBS | SYSTOLIC BLOOD PRESSURE: 122 MMHG | BODY MASS INDEX: 29.5 KG/M2

## 2021-11-18 DIAGNOSIS — N95.1 MENOPAUSAL SYMPTOMS: Primary | ICD-10-CM

## 2021-11-18 DIAGNOSIS — N95.1 MENOPAUSAL SYMPTOMS: ICD-10-CM

## 2021-11-18 DIAGNOSIS — R73.01 ELEVATED FASTING GLUCOSE: ICD-10-CM

## 2021-11-18 DIAGNOSIS — N95.0 PMB (POSTMENOPAUSAL BLEEDING): ICD-10-CM

## 2021-11-18 LAB
ESTRADIOL SERPL HS-MCNC: 71.7 PG/ML
HBA1C MFR BLD: 4.9 % (ref 4.8–5.6)

## 2021-11-18 PROCEDURE — 83036 HEMOGLOBIN GLYCOSYLATED A1C: CPT

## 2021-11-18 PROCEDURE — 99213 OFFICE O/P EST LOW 20 MIN: CPT | Performed by: NURSE PRACTITIONER

## 2021-11-18 PROCEDURE — 82670 ASSAY OF TOTAL ESTRADIOL: CPT

## 2021-11-18 RX ORDER — ESTRADIOL 0.5 MG/1
0.5 TABLET ORAL DAILY
Qty: 30 TABLET | Refills: 2 | Status: SHIPPED | OUTPATIENT
Start: 2021-11-18 | End: 2022-02-21 | Stop reason: SDUPTHER

## 2021-11-18 RX ORDER — SPIRONOLACTONE 100 MG/1
TABLET, FILM COATED ORAL
COMMUNITY
Start: 2021-11-06 | End: 2022-02-21 | Stop reason: SDUPTHER

## 2021-11-18 RX ORDER — PROGESTERONE 100 MG/1
CAPSULE ORAL
Qty: 30 CAPSULE | Refills: 2 | Status: SHIPPED | OUTPATIENT
Start: 2021-11-18 | End: 2021-11-30 | Stop reason: DRUGHIGH

## 2021-11-18 NOTE — PROGRESS NOTES
Problem Visit     Patient Name: Karthik Ibrahim  : 1965   MRN: [id number]   Care Team: Patient Care Team:  Abram Adams MD as PCP - General (Internal Medicine)    Chief Complaint:    Chief Complaint   Patient presents with   • Follow-up     hormones / had a menses on 21       HPI: Karthik Ibrahim is a 56 y.o. year old  presenting to be seen for 3 month f/u HRT start.   C/o hot flashes, fatigue, brain fog, and diffuse joint pain   Sx have improved a little since starting HRT in August at AV   Estrace 0.5mg qd   Prometrium 100mg qd     Reports period like flow in September and again this month   Started bldg 4 days ago and still bldg today with period like flow   Did have a full year of amenorrhea prior to these bldg episodes     C/o weight gain - approx 25 lbs in the last year   Was a size 8 and now a size 12   States her diet and activity have not changed   Labs just done with PCP - she has an appt to discuss next wk   TSH, CBC, and lipid panel WNL   Fasting glucose was elevated at 110         Subjective      /80   Resp 16   Wt 79.8 kg (176 lb)   LMP 2021   Breastfeeding No   BMI 29.50 kg/m²     BMI reviewed: Body mass index is 29.5 kg/m².      Objective     Physical Exam      Neuro: alert and oriented to person, place and time   General:  alert; cooperative; well developed; well nourished   Skin:  Not performed.   Thyroid: not examined   Lungs:  breathing is unlabored   Heart:  Not performed.   Breasts:  Not performed.   Abdomen: Not performed.   Pelvis: Not performed.         Assessment / Plan      Assessment  Problems Addressed This Visit    ICD-10-CM ICD-9-CM   1. Menopausal symptoms  N95.1 627.2   2. PMB (postmenopausal bleeding)  N95.0 627.1   3. Elevated fasting glucose  R73.01 790.21       Plan    Discussed bldg should not occur after menopause and requires further evaluation   [facility] ordered to measure EMS - will need tissue sampling in some way, but will  determine final POC after reviewing u/s report   Will call to discuss final POC     Cont with HRT at this time   Check estradiol level today   May increase estradiol if level low, after completing evaluation of PMB     Reviewed labs ordered by PCP   Hgb A1C ordered d/t elevated fasting glucose  Will call to discuss results           Follow Up  No follow-ups on file.  Patient was given instructions and counseling regarding her condition or for health maintenance advice. Please see specific information pulled into the AVS if appropriate.     Mary Alice Ruelas, APRN  November 18, 2021  10:10 EST

## 2021-11-22 ENCOUNTER — HOSPITAL ENCOUNTER (OUTPATIENT)
Dept: GENERAL RADIOLOGY | Facility: HOSPITAL | Age: 56
Discharge: HOME | End: 2021-11-22
Admitting: NURSE PRACTITIONER

## 2021-11-22 ENCOUNTER — OFFICE VISIT (OUTPATIENT)
Dept: INTERNAL MEDICINE | Facility: CLINIC | Age: 56
End: 2021-11-22

## 2021-11-22 VITALS
TEMPERATURE: 97.2 F | HEART RATE: 60 BPM | HEIGHT: 64 IN | WEIGHT: 177 LBS | SYSTOLIC BLOOD PRESSURE: 116 MMHG | DIASTOLIC BLOOD PRESSURE: 80 MMHG | OXYGEN SATURATION: 97 % | BODY MASS INDEX: 30.22 KG/M2

## 2021-11-22 DIAGNOSIS — M54.2 NECK PAIN: ICD-10-CM

## 2021-11-22 DIAGNOSIS — E66.09 CLASS 1 OBESITY DUE TO EXCESS CALORIES WITH SERIOUS COMORBIDITY AND BODY MASS INDEX (BMI) OF 30.0 TO 30.9 IN ADULT: ICD-10-CM

## 2021-11-22 DIAGNOSIS — Z00.00 ROUTINE PHYSICAL EXAMINATION: Primary | ICD-10-CM

## 2021-11-22 DIAGNOSIS — J34.89 NOSE PAIN: ICD-10-CM

## 2021-11-22 DIAGNOSIS — Z12.11 SCREENING FOR COLON CANCER: ICD-10-CM

## 2021-11-22 PROCEDURE — 70160 X-RAY EXAM OF NASAL BONES: CPT

## 2021-11-22 PROCEDURE — 99396 PREV VISIT EST AGE 40-64: CPT | Performed by: NURSE PRACTITIONER

## 2021-11-22 NOTE — PROGRESS NOTES
Subjective   Chief Complaint   Patient presents with   • Annual Exam       Karthik Ibrahim is a 56 y.o. female here today for annual exam.  Has gained about 20 lbs over the past year or so  Complains of nose pain after an injury after being hit by a dog, happened >2 weeks ago, upper gum still hurts    Overall healthy: Yes  Regular exercise:  Yes, runs and plays tennis  Diet is well balanced:  Yes  Vitamin Supplement:  Yes  Alcohol intake:  Yes, 2 glasses of wine daily with dinner   Tobacco use:  No    Cardiovascular risk is low:  Yes   Menstrual cycle regular:  Started hormones a few weeks ago and has been bleeding ever since, being followed by gynecology  PAP:  2021, normal  Concern for STDs:  No  Mammogram:  Due in December  Last colon screening:  Due in 2028  Regular dental exam:  Yes   Regular eye exam:  Yes, has macular degeneration  Immunizations up to date:  Yes  Wear a seatbelt regularly:  Yes  Wear sunscreen regularly when outdoors:  Yes    Review of Systems   Constitutional: Positive for unexpected weight gain. Negative for activity change, appetite change and fatigue.   HENT: Negative for congestion.    Respiratory: Negative for cough and shortness of breath.    Cardiovascular: Negative for chest pain and leg swelling.   Gastrointestinal: Negative for abdominal pain.   Neurological: Negative for dizziness, weakness and confusion.   Psychiatric/Behavioral: Negative for behavioral problems and decreased concentration.       The following portions of the patient's history were reviewed and updated as appropriate: allergies, current medications, past family history, past medical history, past social history, past surgical history and problem list.    Past Medical History:   Diagnosis Date   • Family Hx of osteoporosis MGM/osteopenia mother/patient normal DEXA age 51 2/15/2018   • Headache    • Hyperplastic colon polyp 2011 2/15/2018   • Hypertension      Past Surgical History:   Procedure Laterality Date   •  "CHOLECYSTECTOMY     • COLONOSCOPY W/ BIOPSIES   and     Hyperplastic    • SKIN CANCER EXCISION      facial moles removed   • TUBAL ABDOMINAL LIGATION     • WISDOM TOOTH EXTRACTION       Family History   Problem Relation Age of Onset   • Ovarian cancer Maternal Grandmother 70   • Osteoporosis Maternal Grandmother    • Kidney disease Maternal Grandmother    • Skin cancer Maternal Grandmother    • Colon cancer Maternal Aunt    • Colon cancer Paternal Grandfather    • Osteoporosis Mother    • Hepatitis Father    • Lung cancer Father 57        smoker   • Heart disease Maternal Grandfather    • BRCA 1/2 Neg Hx    • Breast cancer Neg Hx    • Endometrial cancer Neg Hx      Social History     Tobacco Use   Smoking Status Former Smoker   • Quit date:    • Years since quittin.8   Smokeless Tobacco Never Used      Social History     Substance and Sexual Activity   Alcohol Use Yes   • Alcohol/week: 14.0 standard drinks   • Types: 14 Glasses of wine per week    Comment: suggest 10 per week max      No Known Allergies    Current Outpatient Medications on File Prior to Visit   Medication Sig   • estradiol (Estrace) 0.5 MG tablet Take 1 tablet by mouth Daily.   • FLUoxetine (PROzac) 20 MG capsule TAKE ONE CAPSULE BY MOUTH DAILY   • Multiple Vitamins-Minerals (PRESERVISION AREDS 2+MULTI VIT PO) Take 1 tablet by mouth Daily.   • olmesartan (Benicar) 40 MG tablet Take 1 tablet by mouth Every Night.   • Progesterone (Prometrium) 100 MG capsule Take 1 tablet po qhs.   • spironolactone (ALDACTONE) 100 MG tablet      No current facility-administered medications on file prior to visit.       Objective   Vitals:    21 0800   BP: 116/80   Pulse: 60   Temp: 97.2 °F (36.2 °C)   TempSrc: Temporal   SpO2: 97%   Weight: 80.3 kg (177 lb)   Height: 162.6 cm (64\")     Body mass index is 30.38 kg/m².     Physical Exam  Vitals and nursing note reviewed.   HENT:      Head: Normocephalic.      Right Ear: Tympanic membrane, ear " canal and external ear normal.      Left Ear: Tympanic membrane, ear canal and external ear normal.      Nose: Nasal tenderness present.      Mouth/Throat:      Mouth: Mucous membranes are moist.      Pharynx: Oropharynx is clear.   Eyes:      Extraocular Movements: Extraocular movements intact.      Conjunctiva/sclera: Conjunctivae normal.      Pupils: Pupils are equal, round, and reactive to light.   Neck:      Thyroid: No thyroid mass, thyromegaly or thyroid tenderness.      Vascular: No carotid bruit.   Cardiovascular:      Rate and Rhythm: Normal rate and regular rhythm.      Pulses: Normal pulses.           Carotid pulses are 2+ on the right side and 2+ on the left side.     Heart sounds: Normal heart sounds. No murmur heard.      Pulmonary:      Effort: Pulmonary effort is normal.      Breath sounds: Normal breath sounds. No wheezing, rhonchi or rales.   Abdominal:      General: Bowel sounds are normal. There is no distension.      Palpations: Abdomen is soft.      Tenderness: There is no abdominal tenderness.   Musculoskeletal:      Right lower leg: No edema.      Left lower leg: No edema.      Comments: Full ROM of all major joints   Lymphadenopathy:      Cervical: No cervical adenopathy.   Skin:     General: Skin is warm and dry.      Capillary Refill: Capillary refill takes less than 2 seconds.   Neurological:      General: No focal deficit present.      Mental Status: She is alert and oriented to person, place, and time.      GCS: GCS eye subscore is 4. GCS verbal subscore is 5. GCS motor subscore is 6.      Motor: Motor function is intact.      Coordination: Coordination is intact.      Gait: Gait is intact.   Psychiatric:         Attention and Perception: Attention normal.         Mood and Affect: Mood normal.         Speech: Speech normal.         Behavior: Behavior normal.         Thought Content: Thought content normal.         Cognition and Memory: Cognition and memory normal.         Judgment:  Judgment normal.         Patient's Body mass index is 30.38 kg/m². indicating that she is obese (BMI >30). Obesity-related health conditions include the following: hypertension. Obesity is worsening. BMI is is above average; BMI management plan is completed. We discussed portion control and increasing exercise..     Assessment/Plan     Current Outpatient Medications:   •  estradiol (Estrace) 0.5 MG tablet, Take 1 tablet by mouth Daily., Disp: 30 tablet, Rfl: 2  •  FLUoxetine (PROzac) 20 MG capsule, TAKE ONE CAPSULE BY MOUTH DAILY, Disp: 90 capsule, Rfl: 3  •  Multiple Vitamins-Minerals (PRESERVISION AREDS 2+MULTI VIT PO), Take 1 tablet by mouth Daily., Disp: , Rfl:   •  olmesartan (Benicar) 40 MG tablet, Take 1 tablet by mouth Every Night., Disp: 90 tablet, Rfl: 3  •  Progesterone (Prometrium) 100 MG capsule, Take 1 tablet po qhs., Disp: 30 capsule, Rfl: 2  •  spironolactone (ALDACTONE) 100 MG tablet, , Disp: , Rfl:      Lab on 11/18/2021   Component Date Value Ref Range Status   • Estradiol 11/18/2021 71.7  pg/mL Final   • Hemoglobin A1C 11/18/2021 4.90  4.80 - 5.60 % Final   Lab on 11/11/2021   Component Date Value Ref Range Status   • WBC 11/11/2021 5.34  3.40 - 10.80 10*3/mm3 Final   • RBC 11/11/2021 4.26  3.77 - 5.28 10*6/mm3 Final   • Hemoglobin 11/11/2021 14.4  12.0 - 15.9 g/dL Final   • Hematocrit 11/11/2021 41.6  34.0 - 46.6 % Final   • MCV 11/11/2021 97.7* 79.0 - 97.0 fL Final   • MCH 11/11/2021 33.8* 26.6 - 33.0 pg Final   • MCHC 11/11/2021 34.6  31.5 - 35.7 g/dL Final   • RDW 11/11/2021 11.8* 12.3 - 15.4 % Final   • RDW-SD 11/11/2021 42.2  37.0 - 54.0 fl Final   • MPV 11/11/2021 10.7  6.0 - 12.0 fL Final   • Platelets 11/11/2021 249  140 - 450 10*3/mm3 Final   • Neutrophil % 11/11/2021 56.4  42.7 - 76.0 % Final   • Lymphocyte % 11/11/2021 35.0  19.6 - 45.3 % Final   • Monocyte % 11/11/2021 7.1  5.0 - 12.0 % Final   • Eosinophil % 11/11/2021 0.7  0.3 - 6.2 % Final   • Basophil % 11/11/2021 0.4  0.0 - 1.5  % Final   • Immature Grans % 11/11/2021 0.4  0.0 - 0.5 % Final   • Neutrophils, Absolute 11/11/2021 3.01  1.70 - 7.00 10*3/mm3 Final   • Lymphocytes, Absolute 11/11/2021 1.87  0.70 - 3.10 10*3/mm3 Final   • Monocytes, Absolute 11/11/2021 0.38  0.10 - 0.90 10*3/mm3 Final   • Eosinophils, Absolute 11/11/2021 0.04  0.00 - 0.40 10*3/mm3 Final   • Basophils, Absolute 11/11/2021 0.02  0.00 - 0.20 10*3/mm3 Final   • Immature Grans, Absolute 11/11/2021 0.02  0.00 - 0.05 10*3/mm3 Final   • nRBC 11/11/2021 0.0  0.0 - 0.2 /100 WBC Final   Orders Only on 11/09/2021   Component Date Value Ref Range Status   • Glucose 11/11/2021 110* 65 - 99 mg/dL Final   • BUN 11/11/2021 18  6 - 20 mg/dL Final   • Creatinine 11/11/2021 0.66  0.57 - 1.00 mg/dL Final   • Sodium 11/11/2021 137  136 - 145 mmol/L Final   • Potassium 11/11/2021 5.0  3.5 - 5.2 mmol/L Final    Slight hemolysis detected by analyzer. Results may be affected.   • Chloride 11/11/2021 101  98 - 107 mmol/L Final   • CO2 11/11/2021 28.7  22.0 - 29.0 mmol/L Final   • Calcium 11/11/2021 9.4  8.6 - 10.5 mg/dL Final   • Total Protein 11/11/2021 7.1  6.0 - 8.5 g/dL Final   • Albumin 11/11/2021 4.70  3.50 - 5.20 g/dL Final   • ALT (SGPT) 11/11/2021 14  1 - 33 U/L Final   • AST (SGOT) 11/11/2021 20  1 - 32 U/L Final   • Alkaline Phosphatase 11/11/2021 76  39 - 117 U/L Final   • Total Bilirubin 11/11/2021 0.4  0.0 - 1.2 mg/dL Final   • eGFR Non African Amer 11/11/2021 93  >60 mL/min/1.73 Final   • Globulin 11/11/2021 2.4  gm/dL Final   • A/G Ratio 11/11/2021 2.0  g/dL Final   • BUN/Creatinine Ratio 11/11/2021 27.3* 7.0 - 25.0 Final   • Anion Gap 11/11/2021 7.3  5.0 - 15.0 mmol/L Final   • TSH 11/11/2021 1.150  0.270 - 4.200 uIU/mL Final   • Total Cholesterol 11/11/2021 161  0 - 200 mg/dL Final   • Triglycerides 11/11/2021 58  0 - 150 mg/dL Final   • HDL Cholesterol 11/11/2021 71* 40 - 60 mg/dL Final   • LDL Cholesterol  11/11/2021 78  0 - 100 mg/dL Final   • VLDL Cholesterol  11/11/2021 12  5 - 40 mg/dL Final   • LDL/HDL Ratio 11/11/2021 1.10   Final   ]    Problem List Items Addressed This Visit     None      Visit Diagnoses     Routine physical examination    -  Primary    Screening for colon cancer        Relevant Orders    Cologuard - Stool, Per Rectum    Nose pain        Relevant Orders    XR Nasal Bones    Class 1 obesity due to excess calories with serious comorbidity and body mass index (BMI) of 30.0 to 30.9 in adult            Order for Cologuard  Vaccines UTD  mammo due in Dec  XR nasal bone to r/o fx       Counseling was given to patient for the following topics: appropriate exercise, healthy eating habits, disease prevention and importance of self breast exam and breast health.      Plan of care reviewed with the patient at the conclusion of today's visit.  Education was provided regarding diagnosis, management, and any prescribed or recommended OTC medications.  Patient verbalized understanding of and agreement with management plan.     Return in about 6 months (around 5/22/2022) for HTN.         ALMA Monge

## 2021-11-30 DIAGNOSIS — Z79.890 HORMONE REPLACEMENT THERAPY (HRT): ICD-10-CM

## 2021-11-30 DIAGNOSIS — N95.0 PMB (POSTMENOPAUSAL BLEEDING): Primary | ICD-10-CM

## 2021-11-30 RX ORDER — PROGESTERONE 200 MG/1
200 CAPSULE ORAL DAILY
Qty: 30 CAPSULE | Refills: 3 | Status: SHIPPED | OUTPATIENT
Start: 2021-11-30 | End: 2022-01-13

## 2021-12-02 ENCOUNTER — TELEPHONE (OUTPATIENT)
Dept: OBSTETRICS AND GYNECOLOGY | Facility: CLINIC | Age: 56
End: 2021-12-02

## 2021-12-02 NOTE — TELEPHONE ENCOUNTER
----- Message from ALMA Moraes sent at 11/30/2021  6:29 PM EST -----  S/w pt to discuss report - EMS just slightly thicker then desired 4mm at 5.9mm.    Will increase prometrium to 200mg qd and cont with estradiol 0.5mg qd.    Rpt u/s in 3 months to recheck EMS - I will call her to discuss.  She will call me with any bldg - pt v/u.      Magdalena,  Can you please call her and schedule a 3 month f/u u/s?  I have put the order in and she is aware of the plan.  I will call her to discuss it.     Thanks!

## 2021-12-08 ENCOUNTER — HOSPITAL ENCOUNTER (OUTPATIENT)
Dept: MAMMOGRAPHY | Facility: HOSPITAL | Age: 56
Discharge: HOME | End: 2021-12-08
Admitting: NURSE PRACTITIONER

## 2021-12-08 DIAGNOSIS — Z12.31 VISIT FOR SCREENING MAMMOGRAM: ICD-10-CM

## 2021-12-08 PROCEDURE — 77067 SCR MAMMO BI INCL CAD: CPT

## 2021-12-08 PROCEDURE — 77063 BREAST TOMOSYNTHESIS BI: CPT | Performed by: RADIOLOGY

## 2021-12-08 PROCEDURE — 77063 BREAST TOMOSYNTHESIS BI: CPT

## 2021-12-08 PROCEDURE — 77067 SCR MAMMO BI INCL CAD: CPT | Performed by: RADIOLOGY

## 2021-12-23 ENCOUNTER — HOSPITAL ENCOUNTER (OUTPATIENT)
Dept: ULTRASOUND IMAGING | Facility: HOSPITAL | Age: 56
Discharge: HOME | End: 2021-12-23
Admitting: RADIOLOGY

## 2021-12-23 DIAGNOSIS — R92.8 ABNORMAL MAMMOGRAM: ICD-10-CM

## 2021-12-23 PROCEDURE — 76642 ULTRASOUND BREAST LIMITED: CPT | Performed by: RADIOLOGY

## 2021-12-23 PROCEDURE — 76642 ULTRASOUND BREAST LIMITED: CPT

## 2022-01-03 ENCOUNTER — APPOINTMENT (OUTPATIENT)
Dept: ULTRASOUND IMAGING | Facility: HOSPITAL | Age: 57
End: 2022-01-03

## 2022-01-13 DIAGNOSIS — I10 ESSENTIAL HYPERTENSION: ICD-10-CM

## 2022-01-13 DIAGNOSIS — F41.9 ANXIETY: ICD-10-CM

## 2022-01-13 DIAGNOSIS — F33.1 MODERATE EPISODE OF RECURRENT MAJOR DEPRESSIVE DISORDER: ICD-10-CM

## 2022-01-13 RX ORDER — PROGESTERONE 200 MG/1
CAPSULE ORAL
Qty: 90 CAPSULE | Refills: 1 | Status: SHIPPED | OUTPATIENT
Start: 2022-01-13 | End: 2022-02-21 | Stop reason: SDUPTHER

## 2022-01-13 RX ORDER — FLUOXETINE HYDROCHLORIDE 20 MG/1
CAPSULE ORAL
Qty: 90 CAPSULE | Refills: 3 | Status: SHIPPED | OUTPATIENT
Start: 2022-01-13 | End: 2022-05-16

## 2022-02-07 ENCOUNTER — APPOINTMENT (OUTPATIENT)
Dept: CT IMAGING | Facility: HOSPITAL | Age: 57
End: 2022-02-07

## 2022-02-07 ENCOUNTER — HOSPITAL ENCOUNTER (EMERGENCY)
Facility: HOSPITAL | Age: 57
Discharge: HOME | End: 2022-02-07
Attending: EMERGENCY MEDICINE | Admitting: EMERGENCY MEDICINE

## 2022-02-07 VITALS
RESPIRATION RATE: 18 BRPM | DIASTOLIC BLOOD PRESSURE: 82 MMHG | TEMPERATURE: 97.9 F | SYSTOLIC BLOOD PRESSURE: 125 MMHG | HEIGHT: 66 IN | HEART RATE: 68 BPM | OXYGEN SATURATION: 96 % | WEIGHT: 165 LBS | BODY MASS INDEX: 26.52 KG/M2

## 2022-02-07 DIAGNOSIS — S09.90XA INJURY OF HEAD, INITIAL ENCOUNTER: Primary | ICD-10-CM

## 2022-02-07 PROCEDURE — 70450 CT HEAD/BRAIN W/O DYE: CPT

## 2022-02-07 PROCEDURE — 99283 EMERGENCY DEPT VISIT LOW MDM: CPT

## 2022-02-07 PROCEDURE — 63710000001 ONDANSETRON PER 8 MG: Performed by: EMERGENCY MEDICINE

## 2022-02-07 PROCEDURE — 72131 CT LUMBAR SPINE W/O DYE: CPT

## 2022-02-07 PROCEDURE — 72125 CT NECK SPINE W/O DYE: CPT

## 2022-02-07 RX ORDER — METHOCARBAMOL 750 MG/1
750 TABLET, FILM COATED ORAL 3 TIMES DAILY PRN
Qty: 20 TABLET | Refills: 0 | Status: SHIPPED | OUTPATIENT
Start: 2022-02-07

## 2022-02-07 RX ORDER — HYDROCODONE BITARTRATE AND ACETAMINOPHEN 7.5; 325 MG/1; MG/1
1 TABLET ORAL ONCE
Status: COMPLETED | OUTPATIENT
Start: 2022-02-07 | End: 2022-02-07

## 2022-02-07 RX ORDER — METHOCARBAMOL 750 MG/1
750 TABLET, FILM COATED ORAL 4 TIMES DAILY
Status: DISCONTINUED | OUTPATIENT
Start: 2022-02-07 | End: 2022-02-07 | Stop reason: HOSPADM

## 2022-02-07 RX ORDER — ONDANSETRON 4 MG/1
4 TABLET, FILM COATED ORAL ONCE
Status: COMPLETED | OUTPATIENT
Start: 2022-02-07 | End: 2022-02-07

## 2022-02-07 RX ADMIN — METHOCARBAMOL 750 MG: 750 TABLET ORAL at 13:00

## 2022-02-07 RX ADMIN — ONDANSETRON HYDROCHLORIDE 4 MG: 4 TABLET, FILM COATED ORAL at 13:00

## 2022-02-07 RX ADMIN — HYDROCODONE BITARTRATE AND ACETAMINOPHEN 1 TABLET: 7.5; 325 TABLET ORAL at 13:00

## 2022-02-07 NOTE — DISCHARGE INSTRUCTIONS
Home to rest.  Maintain your very best hydration and nutrition.  Avoid aspirin-containing products.  Apply ice to the hematoma to your scalp: Every 2 hours for 20 minutes during your waking hours for the first 2 days.  Most relaxers been forwarded to your personal pharmacy.  Combine them with Tylenol in your recovery.  Follow-up with Dr. Adams to monitor your recovery.  Thank you

## 2022-02-07 NOTE — ED PROVIDER NOTES
EMERGENCY DEPARTMENT ENCOUNTER    Pt Name: Karthik Ibrahim  MRN: [id number]  Pt :   1965  Room Number:  RW6/R6  Date of encounter:  2022  PCP: Abram Adams MD  ED Provider: ALMA Henriquez    Historian: patient    HPI:  Chief Complaint:         Context: Karthik Ibrahim is a 56 y.o. female who presents to the ED c/o headache and neck pain and low back pain onset this morning after slipping on ice with subsequent fall and striking her head on a hard surface approximately 8:50 AM this morning.  Patient denies any loss of consciousness.  She has no neurosensory complaints or focal weakness.  No nausea or vomiting.  No altered mental status.    Review of systems is negative for fever chills or recent illness.  Negative for chest pain or cough or shortness of breath or palpitations.  No syncope.  No profound weakness, dizziness.  GI and  systems are negative.  No neurosensory complaints or focal weakness.  Positive for headache.      PAST MEDICAL HISTORY  Past Medical History:   Diagnosis Date   • Family Hx of osteoporosis MGM/osteopenia mother/patient normal DEXA age 51 2/15/2018   • Headache    • Hyperplastic colon polyp 2011 2/15/2018   • Hypertension          PAST SURGICAL HISTORY  Past Surgical History:   Procedure Laterality Date   • CHOLECYSTECTOMY     • COLONOSCOPY W/ BIOPSIES   and     Hyperplastic    • SKIN CANCER EXCISION      facial moles removed   • TUBAL ABDOMINAL LIGATION     • WISDOM TOOTH EXTRACTION           FAMILY HISTORY  Family History   Problem Relation Age of Onset   • Ovarian cancer Maternal Grandmother 70   • Osteoporosis Maternal Grandmother    • Kidney disease Maternal Grandmother    • Skin cancer Maternal Grandmother    • Colon cancer Maternal Aunt    • Colon cancer Paternal Grandfather    • Osteoporosis Mother    • Hepatitis Father    • Lung cancer Father 57        smoker   • Heart disease Maternal Grandfather    • BRCA 1/2 Neg Hx    • Breast cancer Neg  Hx    • Endometrial cancer Neg Hx          SOCIAL HISTORY  Social History     Socioeconomic History   • Marital status:    Tobacco Use   • Smoking status: Former Smoker     Quit date:      Years since quittin.1   • Smokeless tobacco: Never Used   Vaping Use   • Vaping Use: Never used   Substance and Sexual Activity   • Alcohol use: Yes     Alcohol/week: 14.0 standard drinks     Types: 14 Glasses of wine per week     Comment: suggest 10 per week max   • Drug use: No   • Sexual activity: Yes     Partners: Male     Birth control/protection: Surgical         ALLERGIES  Patient has no known allergies.        REVIEW OF SYSTEMS  Review of Systems     All systems reviewed and negative except for those discussed in HPI.       PHYSICAL EXAM    I have reviewed the triage vital signs and nursing notes.    ED Triage Vitals [22 1140]   Temp Heart Rate Resp BP SpO2   97.9 °F (36.6 °C) 68 18 125/82 96 %      Temp src Heart Rate Source Patient Position BP Location FiO2 (%)   Oral Monitor Sitting Left arm --       Physical Exam  GENERAL:   Appears in no acute distress.  Her vital signs are normal.  HENT: Nares patent.  Patient has a hematoma on the left parietal scalp.  There is an abrasion bleeding is controlled.  Neck: Patient demonstrates full range of motion without limitation but tenderness to palpation of the paravertebral musculature  EYES: No scleral icterus.  CV: Regular rhythm, regular rate.  RESPIRATORY: Normal effort.  No audible wheezes, rales or rhonchi.  Chest wall is nontender.  ABDOMEN: Soft, nontender  MUSCULOSKELETAL: No deformities.  Pelvis is stable.  Straight leg raise is negative bilaterally.  Patient moves all 4 extremities normally.  NEURO: Alert, moves all extremities, follows commands.  Patient has 5 out of 5 strength in all 4 extremities.  No neurosensory deficits or focal weakness.  SKIN: Warm, dry, no rash visualized.        LAB RESULTS  No results found for this or any previous  visit (from the past 24 hour(s)).    If labs were ordered, I independently reviewed the results.        RADIOLOGY  CT Head Without Contrast, CT Cervical Spine Without Contrast, CT Lumbar Spine Without Contrast    Result Date: 2/7/2022  EXAMINATION: CT CERVICAL SPINE WO CONTRAST-, CT HEAD WO CONTRAST-, CT LUMBAR SPINE WO CONTRAST-  INDICATION: Neck trauma, uncomplicated (NEXUS/CCR neg) (Age 16-64y)  TECHNIQUE: Axial noncontrast CT of the head, cervical spine and lumbar spine with multiplanar reconstruction  The radiation dose reduction device was turned on for each scan per the ALARA (As Low as Reasonably Achievable) protocol.  COMPARISON: NONE  FINDINGS: CT head: Left parietal scalp hematoma noted. The calvarium is intact. Gray-white differentiation is maintained and there is no evidence of intracranial hemorrhage, mass or mass effect. The ventricles are normal in size and configuration. The orbits are unremarkable and the paranasal sinuses are grossly clear.  CT cervical spine: The dens is intact. There is mild straightening of usual cervical lordosis, otherwise without evidence of subluxation or traumatic malalignment. The patient's head appears rotated, without definite evidence of rotary subluxation there is no evidence of acute fracture. Minimal spondylosis change is evident. The paraspinal soft tissues are unremarkable. The lung apices are grossly clear.  Lumbar spine: Vertebral body heights are maintained without evidence of acute fracture and alignment is anatomic without evidence of listhesis or subluxation. The paraspinal soft tissues are unremarkable. No significant spondylosis change is evident.      No acute intracranial abnormality. Prominent left parietal scalp hematoma noted.  No acute fracture or traumatic malalignment of the cervical or lumbar spine. Patient appears rotated in the scanner, without specific findings of rotary subluxation or other traumatic malalignment.   This report was finalized  on 2/7/2022 1:14 PM by Julio César Edwards.        PROCEDURES    Procedures    No orders to display       MEDICATIONS GIVEN IN ER    Medications   HYDROcodone-acetaminophen (NORCO) 7.5-325 MG per tablet 1 tablet (1 tablet Oral Given 2/7/22 1300)   ondansetron (ZOFRAN) tablet 4 mg (4 mg Oral Given 2/7/22 1300)             ED Course as of 02/07/22 1756   Mon Feb 07, 2022   1324 Patient imaging is negative for acute findings.  Her vital signs have been stable throughout her ED course.  We discussed parameters for concern that would warrant return to the emergency department.  Patient understands and concurs with his outpatient plan of care close follow-up [MS]      ED Course User Index  [MS] NiecyBridgett hendrix APRN             AS OF 17:56 EST VITALS:    BP - 125/82  HR - 68  TEMP - 97.9 °F (36.6 °C) (Oral)  O2 SATS - 96%                  DIAGNOSIS  Final diagnoses:   Injury of head, initial encounter         DISPOSITION    DISCHARGE    Patient discharged in stable condition.    Reviewed implications of results, diagnosis, meds, responsibility to follow up, warning signs and symptoms of possible worsening, potential complications and reasons to return to ER.    Patient/Family voiced understanding of above instructions.    Discussed plan for discharge, as there is no emergent indication for admission.  Pt/family is agreeable and understands need for follow up and possible repeat testing.  Pt/family is aware that discharge does not mean that nothing is wrong but that it indicates no emergency is currently present that requires admission and they must continue care with follow-up as given below or with a physician of their choice.     FOLLOW-UP  Abram Adams MD  [address]  [address] [address] [phone]    Schedule an appointment as soon as possible for a visit in 2 days  If symptoms worsen         Medication List      New Prescriptions    methocarbamol 750 MG tablet  Commonly known as: ROBAXIN  Take  1 tablet by mouth 3 (Three) Times a Day As Needed for Muscle Spasms.           Where to Get Your Medications      These medications were sent to [facility] [address] [place] [address], [place] - [address] [facility] - [phone]  - [phone]   [address] [facility] [address]    Phone: [phone]   · methocarbamol 750 MG tablet                  Bridgett Pemberton, ALMA  02/07/22 5523

## 2022-02-16 ENCOUNTER — TREATMENT (OUTPATIENT)
Dept: PHYSICAL THERAPY | Facility: CLINIC | Age: 57
End: 2022-02-16

## 2022-02-16 DIAGNOSIS — M54.2 PAIN, NECK: Primary | ICD-10-CM

## 2022-02-16 PROCEDURE — 97162 PT EVAL MOD COMPLEX 30 MIN: CPT | Performed by: PHYSICAL THERAPIST

## 2022-02-16 NOTE — PROGRESS NOTES
"Physical Therapy Initial Evaluation and Plan of Care        Patient: Karthik Ibrahim   : 1965  Diagnosis/ICD-10 Code:  Pain, neck [M54.2]  Referring practitioner: Jimenez Reina MD  Date of Initial Visit: 2022  Today's Date: 2022  Patient seen for 1 sessions           Subjective Questionnaire: NDI: 2045      Subjective Evaluation    History of Present Illness  Date of onset: 2022  Mechanism of injury: Patient fell on black ice and his the back left of her head. She went to ED and received multiple scans that revealed nothing more than a head contusion. She initially felt sore and tight in the front of her neck and jaw, but now feels pain in the back of her neck with radiating \"annoying\" pain along the inner aspect of her R UE to her 4th and 5th digits.    Patient attempted to return to work 2 days ago but was very sore and exhausted following her first shift. She felt dizzy after her 2nd shift yesterday.    Medical history: hypertension (controlled)      Patient Occupation:    Precautions and Work Restrictions: unrestricted, returned full time this weekPain  Current pain rating: 3  At best pain rating: 3  At worst pain ratin  Alleviating factors: nothing.  Exacerbated by: writing (hand), working a full shift, looking up or down (dizziness)    Hand dominance: right    Patient Goals  Patient goals for therapy: decreased pain             Objective          Postural Observations    Additional Postural Observation Details  Forward head, rounded shoulders    Raised contusion present along L crown of her head    Palpation   Left   Hypertonic in the levator scapulae, sternocleidomastoid and upper trapezius.   Tenderness of the sternocleidomastoid and suboccipitals.     Right   Hypertonic in the levator scapulae, sternocleidomastoid and upper trapezius. Tenderness of the sternocleidomastoid, suboccipitals and upper trapezius.     Additional Palpation Details  Joint mobility: increased " sensitivity to joint mobility testing throughout cervical spine, especially R-sided upper cervical region    Neurological Testing     Additional Neurological Details  ULTTs:   Median: guarding bilaterally headache L  Radial: negative bilaterally  Ulnar: negative L, unclear R    Active Range of Motion   Cervical/Thoracic Spine   Cervical    Flexion: 25 (dizzy, shaky movement) degrees   Extension: 25 (dizzy) degrees   Left lateral flexion: 28 degrees   Right lateral flexion: 10 degrees   Left rotation: 65 degrees   Right rotation: 45 degrees     Strength/Myotome Testing   Cervical Spine     Left   Neck lateral flexion (C3): 5    Right   Neck lateral flexion (C3): 5    Left Shoulder     Planes of Motion   Abduction: 4+     Right Shoulder     Planes of Motion   Abduction: 4+     Left Elbow   Flexion: 5  Extension: 5    Right Elbow   Flexion: 5  Extension: 4    Left Wrist/Hand   Wrist extension: 5  Wrist flexion: 5    Right Wrist/Hand   Wrist extension: 5  Wrist flexion: 5    Tests   Cervical   Deep neck flexor endurance: 4 (occipital pain and loss of DNF) seconds    Additional Tests Details  Both axial loading and distraction provides relief of symptoms          Assessment & Plan     Assessment  Impairments: abnormal coordination, abnormal muscle firing, abnormal or restricted ROM, lacks appropriate home exercise program and pain with function  Functional Limitations: sleeping, uncomfortable because of pain and sitting  Assessment details: Patient presents with signs and symptoms 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 Dizziness seems cervicogenic in nature.     Barriers to therapy: acuity  Prognosis: good    Goals  Plan Goals: Short Term Goals 1 weeks  1. Patient to be compliant with initial HEP  2. Patient to demonstrate pain free cervical AROM without shakiness.    Long Term Goals 3 weeks  1.  Patient to be independent with HEP.  2. Patient to demonstrate full pain free cervical AROM.  3. Patient to return to work without increased  4. Patient to report resolution of dizziness.  5. Patient to improve NDI score to < 25%      Plan  Therapy options: will be seen for skilled therapy services  Planned modality interventions: cryotherapy and thermotherapy (hydrocollator packs)  Planned therapy interventions: manual therapy, motor coordination training, neuromuscular re-education, postural training, soft tissue mobilization, spinal/joint mobilization, strengthening, joint mobilization, home exercise program and functional ROM exercises  Frequency: 2x week  Duration in weeks: 3  Treatment plan discussed with: patient  Plan details: 2x/week for 3 weeks          Timed:  Manual Therapy:    0     mins  02064;  Therapeutic Exercise:    0     mins  80589;     Neuromuscular Santi:    0    mins  32191;    Therapeutic Activity:     0     mins  24267;     Gait Trainin     mins  76016;     Ultrasound:     0     mins  87410;    Electrical Stimulation:    0     mins  49365 ( );    Untimed:  Electrical Stimulation:    0     mins  54904 ( );  Mechanical Traction:    0     mins  26226;     Timed Treatment:   0   mins   Total Treatment:     40   mins    PT SIGNATURE: Reji Carrillo PT   DATE TREATMENT INITIATED: 2022    Initial Certification  Certification Period: 2022  I certify that the therapy services are furnished while this patient is under my care.  The services outlined above are required by this patient, and will be reviewed every 90 days.     PHYSICIAN: Jimenez Reina MD      DATE:     Please sign and return via fax to [phone].. Thank you, [facility].

## 2022-02-17 ENCOUNTER — TREATMENT (OUTPATIENT)
Dept: PHYSICAL THERAPY | Facility: CLINIC | Age: 57
End: 2022-02-17

## 2022-02-17 DIAGNOSIS — M54.2 PAIN, NECK: Primary | ICD-10-CM

## 2022-02-17 PROCEDURE — 97110 THERAPEUTIC EXERCISES: CPT | Performed by: PHYSICAL THERAPIST

## 2022-02-17 PROCEDURE — 97140 MANUAL THERAPY 1/> REGIONS: CPT | Performed by: PHYSICAL THERAPIST

## 2022-02-17 NOTE — PROGRESS NOTES
"Physical Therapy Daily Progress Note        Patient: Karthik Ibrahim   : 1965  Diagnosis/ICD-10 Code:  Pain, neck [M54.2]  Referring practitioner: No ref. provider found  Date of Initial Visit: Type: THERAPY  Noted: 2022  Today's Date: 2022  Patient seen for 2 sessions           Patient reports: hitting the top of her head on the bus today and felt like it \"bia her head and neck\". Also reports her headaches occur on the R occipital-periatal region.      Objective   See Exercise, Manual, and Modality Logs for complete treatment.       Assessment/Plan  Initiated gentle mobilization of R upper cervical segments, as they replicate much of her symptoms, to improve tolerance to movement and pressure. Less shakiness and c/o dizziness during AROM cervical flexion following OA mobs on the R. Patient demonstrates high acuity of symptoms, with poor tolerance for most exercises due to guarding and tightening of her neck muscles.            Timed:  Manual Therapy:    15     mins  64662;  Therapeutic Exercise:    23     mins  24684;     Neuromuscular Santi:   0    mins  04723;    Therapeutic Activity:     0     mins  76714;     Gait Trainin     mins  07169;     Ultrasound:     0     mins  01075;    Electrical Stimulation:    0     mins  04489 ( );    Untimed:  Electrical Stimulation:    0     mins  39788 ( );  Mechanical Traction:    0     mins  87414;     Timed Treatment:   38   mins   Total Treatment:     38   mins    Reji Carrillo PT  Physical Therapist    "

## 2022-02-21 ENCOUNTER — TELEPHONE (OUTPATIENT)
Dept: OBSTETRICS AND GYNECOLOGY | Facility: CLINIC | Age: 57
End: 2022-02-21

## 2022-02-21 RX ORDER — ESTRADIOL 0.5 MG/1
0.5 TABLET ORAL DAILY
Qty: 90 TABLET | Refills: 1 | Status: SHIPPED | OUTPATIENT
Start: 2022-02-21 | End: 2022-09-01 | Stop reason: SDUPTHER

## 2022-02-21 RX ORDER — SPIRONOLACTONE 100 MG/1
100 TABLET, FILM COATED ORAL DAILY
Qty: 90 TABLET | Refills: 1 | Status: SHIPPED | OUTPATIENT
Start: 2022-02-21 | End: 2023-01-31

## 2022-02-21 RX ORDER — PROGESTERONE 200 MG/1
200 CAPSULE ORAL DAILY
Qty: 90 CAPSULE | Refills: 1 | Status: SHIPPED | OUTPATIENT
Start: 2022-02-21 | End: 2022-09-01 | Stop reason: SDUPTHER

## 2022-02-21 NOTE — TELEPHONE ENCOUNTER
LIVIA ZAYAS, SENT REQUESTS FOR PROGESTERONE, ESTRADIOL AND SPIRONOLACTONE 9 DAYS AGO WITH NO RESPONSE

## 2022-02-25 ENCOUNTER — TREATMENT (OUTPATIENT)
Dept: PHYSICAL THERAPY | Facility: CLINIC | Age: 57
End: 2022-02-25

## 2022-02-25 DIAGNOSIS — M54.2 PAIN, NECK: Primary | ICD-10-CM

## 2022-02-25 PROCEDURE — 97140 MANUAL THERAPY 1/> REGIONS: CPT | Performed by: PHYSICAL THERAPIST

## 2022-02-25 PROCEDURE — 97110 THERAPEUTIC EXERCISES: CPT | Performed by: PHYSICAL THERAPIST

## 2022-02-25 PROCEDURE — 97530 THERAPEUTIC ACTIVITIES: CPT | Performed by: PHYSICAL THERAPIST

## 2022-02-25 NOTE — PROGRESS NOTES
Physical Therapy Daily Progress Note        Patient: Karthik Ibrahim   : 1965  Diagnosis/ICD-10 Code:  Pain, neck [M54.2]  Referring practitioner: Jimenez Reina MD  Date of Initial Visit: Type: THERAPY  Noted: 2022  Today's Date: 2022  Patient seen for 3 sessions           Patient reports: noticing more headaches this past week, more clicking/popping of her jaw, and feeling off balance when she stops walking fast. She also reports increased headaches in the days after last visit. Currently she has a mild headache.       Objective   See Exercise, Manual, and Modality Logs for complete treatment.       Assessment/Plan    Further discussion of patient's symptoms revealed she has been experiencing fatigue, brain fog, memory problems, and nausea, in addition to headaches, dizziness, and neck pain. Patient perceives many prescribed exercises exacerbated her condition, which would be consistent with vestibular and central processing impairments. With mechanism of injury and reported symptoms, further vestibular testing will be completed next visit. Minimized exercise today but continued light R upper cervical mobs, after which she reported resolution of headache.        Timed:  Manual Therapy:    12     mins  71564;  Therapeutic Exercise:    12     mins  47477;     Neuromuscular Santi:   0    mins  05651;    Therapeutic Activity:     15     mins  67145;     Gait Trainin     mins  09625;     Ultrasound:     0     mins  23322;    Electrical Stimulation:    0     mins  53887 ( );    Untimed:  Electrical Stimulation:    0     mins  96018 ( );  Mechanical Traction:    0     mins  30757;     Timed Treatment:   39   mins   Total Treatment:     39   mins    Reji Carrillo PT  Physical Therapist

## 2022-03-02 ENCOUNTER — TREATMENT (OUTPATIENT)
Dept: PHYSICAL THERAPY | Facility: CLINIC | Age: 57
End: 2022-03-02

## 2022-03-02 DIAGNOSIS — R93.89 THICKENED ENDOMETRIUM: Primary | ICD-10-CM

## 2022-03-02 DIAGNOSIS — M54.2 PAIN, NECK: Primary | ICD-10-CM

## 2022-03-02 DIAGNOSIS — Z79.890 HORMONE REPLACEMENT THERAPY (HRT): ICD-10-CM

## 2022-03-02 PROCEDURE — 97140 MANUAL THERAPY 1/> REGIONS: CPT | Performed by: PHYSICAL THERAPIST

## 2022-03-02 PROCEDURE — 97112 NEUROMUSCULAR REEDUCATION: CPT | Performed by: PHYSICAL THERAPIST

## 2022-03-02 NOTE — PROGRESS NOTES
"Physical Therapy Daily Treatment Note      Patient: Karthik Ibrahim   : 1965  Referring practitioner: Jimenez Reina MD  Date of Initial Visit: Type: THERAPY  Noted: 2022  Today's Date: 3/2/2022  Patient seen for 4 sessions       Visit Diagnoses:    ICD-10-CM ICD-9-CM   1. Pain, neck  M54.2 723.1       Subjective   Patient reports most limitation in dizzy symptoms and difficulty concentrating.  She notes dizzy symptoms are a periodic sensation of movement at rest that lasts a few seconds.  She notes posterior \"banding\" HA that seems to start behind the right ear. Symptoms seem to be unrelated to positional changes, but aggravate with head movements and focus tasks.  She notes at initial injury she was put off work but went home and watched a lot of television.  Still experiencing intermittent nausea since the accident.  She feels she is moving better but she has an inability to relax when her pain is exacerbated.  her pain level is 4/10 upon arrival.      Objective   Symptomatic with VOR1, smooth puursuit (worse to the right), convergence.  Difficulty focusing and coordinating tasks during examination.    Significant right upper cervical tenderness.    Pain right cervical rotation.    No indication for positional testing today.    /68    See Exercise, Manual, and Modality Logs for complete treatment.       Assessment & Plan     Assessment    Assessment details: Performed vestibular testing on the patient today, reflected in NMR time.  She demonstrates decreased eye movement coordination and difficulty focusing.  She is most irritable with smooth pursuit and convergence, less irritable with saccades and VOR1.  She becomes symptomatic almost immediately during convergence.  Based on her pattern of dizziness and description of symptoms, positional testing was not indicated today.  Based on the history of the accident and her symptoms, she likely is experiencing some degree of concussion related " dizziness.  She continues with cervical symptoms extending from the right upper cervical region as well.  Initiated vestibular habituation exercises today, patient advised to stop at the onset of symptoms and return to her baseline prior to completing the next set.  If she becomes less dizzy/nauseated, this will hopefully allow for improved tolerance to cervical treatment advancement.     Plan  Plan details: Continue cervical treatments, advance complexity of tasks with two habituation exercises as she is able to tolerate longer duration of initial tasks.          Timed:         Manual Therapy:    10     mins  29252;     Therapeutic Exercise:    0     mins  53505;     Neuromuscular Santi:    30    mins  08301;    Therapeutic Activity:     0     mins  16930;     Gait Trainin     mins  26687;     Ultrasound:     0     mins  00196;    Ionto                               0    mins   49049  Self Care                       0     mins   13693  Canalith Repos    0     mins 57575      Un-Timed:  Electrical Stimulation:    0     mins  97292 ( );  Dry Needling     0     mins self-pay  Traction     0     mins 80458      Timed Treatment:   40   mins   Total Treatment:     40   mins    Wallace Dejesus, PT  [place] License: [id number]

## 2022-03-04 ENCOUNTER — TREATMENT (OUTPATIENT)
Dept: PHYSICAL THERAPY | Facility: CLINIC | Age: 57
End: 2022-03-04

## 2022-03-04 DIAGNOSIS — M54.2 PAIN, NECK: Primary | ICD-10-CM

## 2022-03-04 PROCEDURE — 97140 MANUAL THERAPY 1/> REGIONS: CPT | Performed by: PHYSICAL THERAPIST

## 2022-03-04 PROCEDURE — 97112 NEUROMUSCULAR REEDUCATION: CPT | Performed by: PHYSICAL THERAPIST

## 2022-03-04 NOTE — PROGRESS NOTES
"Physical Therapy Daily Progress Note        Patient: Karthik Ibrahim   : 1965  Diagnosis/ICD-10 Code:  Pain, neck [M54.2]  Referring practitioner: Jimenez Reina MD  Date of Initial Visit: Type: THERAPY  Noted: 2022  Today's Date: 3/4/2022  Patient seen for 5 sessions           Patient reports: feeling like she isn't performing vestibular HEP correctly.      Objective   See Exercise, Manual, and Modality Logs for complete treatment.       Assessment/Plan  Continued visual processing training, today sitting unsupported for VOR but progressed to standing feet together for convergence, with recovery time about 90\". She required cues for focusing on her target as the background movements were distracting.            Timed:  Manual Therapy:    8     mins  04627;  Therapeutic Exercise:    0     mins  31774;     Neuromuscular Santi:   40    mins  10517;    Therapeutic Activity:     0     mins  10143;     Gait Trainin     mins  26653;     Ultrasound:     0     mins  94573;    Electrical Stimulation:    0     mins  26996 ( );    Untimed:  Electrical Stimulation:    0     mins  98647 ( );  Mechanical Traction:    0     mins  28145;     Timed Treatment:   48   mins   Total Treatment:     48   mins    Reji Carrillo PT  Physical Therapist    "

## 2022-03-07 ENCOUNTER — TREATMENT (OUTPATIENT)
Dept: PHYSICAL THERAPY | Facility: CLINIC | Age: 57
End: 2022-03-07

## 2022-03-07 DIAGNOSIS — M54.2 PAIN, NECK: Primary | ICD-10-CM

## 2022-03-07 PROCEDURE — 97112 NEUROMUSCULAR REEDUCATION: CPT | Performed by: PHYSICAL THERAPIST

## 2022-03-07 PROCEDURE — 97140 MANUAL THERAPY 1/> REGIONS: CPT | Performed by: PHYSICAL THERAPIST

## 2022-03-07 NOTE — PROGRESS NOTES
Physical Therapy Daily Progress Note        Patient: Karthik Ibrahim   : 1965  Diagnosis/ICD-10 Code:  Pain, neck [M54.2]  Referring practitioner: Jimenez Reina MD  Date of Initial Visit: Type: THERAPY  Noted: 2022  Today's Date: 3/7/2022  Patient seen for 6 sessions           Patient reports: not performing HEP over the weekend because her jaw and teeth hurt and were very sensitive.      Objective   See Exercise, Manual, and Modality Logs for complete treatment.       Assessment/Plan  R>L SCM were very TTP and hypertonic, causing jaw pain with palpation. Poor tolerance to manual interventions due to increased tension of her upper trunk. Able to progress habituation exercises by reducing stability, as she was no longer symptomatic in positions used last week.            Timed:  Manual Therapy:    8     mins  09576;  Therapeutic Exercise:    6     mins  48490;     Neuromuscular Santi:   25    mins  27707;    Therapeutic Activity:     0     mins  35228;     Gait Trainin     mins  83637;     Ultrasound:     0     mins  27668;    Electrical Stimulation:    0     mins  23827 ( );    Untimed:  Electrical Stimulation:    0     mins  80571 ( );  Mechanical Traction:    0     mins  86932;     Timed Treatment:   39   mins   Total Treatment:     39   mins    Reji Carrillo PT  Physical Therapist

## 2022-03-10 ENCOUNTER — TREATMENT (OUTPATIENT)
Dept: PHYSICAL THERAPY | Facility: CLINIC | Age: 57
End: 2022-03-10

## 2022-03-10 ENCOUNTER — OFFICE VISIT (OUTPATIENT)
Dept: INTERNAL MEDICINE | Facility: CLINIC | Age: 57
End: 2022-03-10

## 2022-03-10 VITALS
TEMPERATURE: 98.4 F | SYSTOLIC BLOOD PRESSURE: 122 MMHG | OXYGEN SATURATION: 98 % | BODY MASS INDEX: 27 KG/M2 | HEIGHT: 66 IN | RESPIRATION RATE: 16 BRPM | HEART RATE: 67 BPM | WEIGHT: 168 LBS | DIASTOLIC BLOOD PRESSURE: 76 MMHG

## 2022-03-10 DIAGNOSIS — G44.89 OTHER HEADACHE SYNDROME: ICD-10-CM

## 2022-03-10 DIAGNOSIS — F07.81 CONCUSSION SYNDROME: Primary | ICD-10-CM

## 2022-03-10 DIAGNOSIS — M54.2 PAIN, NECK: Primary | ICD-10-CM

## 2022-03-10 PROCEDURE — 99214 OFFICE O/P EST MOD 30 MIN: CPT | Performed by: NURSE PRACTITIONER

## 2022-03-10 PROCEDURE — 97112 NEUROMUSCULAR REEDUCATION: CPT | Performed by: PHYSICAL THERAPIST

## 2022-03-10 PROCEDURE — 97110 THERAPEUTIC EXERCISES: CPT | Performed by: PHYSICAL THERAPIST

## 2022-03-10 RX ORDER — BUTALBITAL, ACETAMINOPHEN AND CAFFEINE 50; 325; 40 MG/1; MG/1; MG/1
1 TABLET ORAL EVERY 4 HOURS PRN
Qty: 12 TABLET | Refills: 0 | Status: SHIPPED | OUTPATIENT
Start: 2022-03-10 | End: 2022-09-01

## 2022-03-10 NOTE — PROGRESS NOTES
"Physical Therapy Daily Treatment Note      Patient: Karthik Ibrahim   : 1965  Referring practitioner: Jimenez Reina MD  Date of Initial Visit: Type: THERAPY  Noted: 2022  Today's Date: 3/10/2022  Patient seen for 7 sessions       Visit Diagnoses:    ICD-10-CM ICD-9-CM   1. Pain, neck  M54.2 723.1       Subjective   Patient reports that she is feeling better overall.  She is working on her exercises.  Still notes \"brain fog\" and forgetfulness as well as considerable fatigue post treatment.  her pain level is 4/10 upon arrival.      Objective     See Exercise, Manual, and Modality Logs for complete treatment.       Assessment & Plan     Assessment    Assessment details: Focused today's treatment on advancing activity processing and increasing complexity of tasks.  She has increased \"tension\" after performing exercises.  She continues to report TMJ/pterygoid symptoms of ear/jaw/teeth pain.  She seemed to find relief with mandibular protrusion.    Plan  Plan details: Work on increasing complexity of vestibular related tasks.          Timed:         Manual Therapy:    0     mins  93954;     Therapeutic Exercise:    25     mins  57722;     Neuromuscular Santi:    26    mins  10437;    Therapeutic Activity:     0     mins  31107;     Gait Trainin     mins  81198;     Ultrasound:     0     mins  75646;    Ionto                               0    mins   06213  Self Care                       0     mins   48208  Canalith Repos    0     mins 25782      Un-Timed:  Electrical Stimulation:    0     mins  88341 ( );  Dry Needling     0     mins self-pay  Traction     0     mins 87140      Timed Treatment:   51   mins   Total Treatment:     51   mins    Wallace Dejesus, PT  [place] License: [id number]      "

## 2022-03-10 NOTE — PROGRESS NOTES
Subjective   Chief Complaint   Patient presents with   • Headache      Karthik Ibrahim is a 56 y.o. female here today for headache.     Dizzy, nausea, brain fog. recenly fell and had left scalp hematoma. Hit top of head but got hematoma.     Dull headache more than not.         I have reviewed the following portions of the patient's history and confirmed they are accurate: allergies, current medications, past family history, past medical history, past social history, past surgical history and problem list    I have personally completed the patient's review of systems.    Review of Systems   Constitutional: Positive for fatigue. Negative for activity change, appetite change, chills, diaphoresis, fever, unexpected weight gain and unexpected weight loss.   HENT: Negative for ear discharge, ear pain, mouth sores, nosebleeds, sinus pressure, sneezing and sore throat.    Eyes: Positive for photophobia. Negative for pain, discharge and itching.   Respiratory: Negative for cough, chest tightness, shortness of breath and wheezing.    Cardiovascular: Negative for chest pain, palpitations and leg swelling.   Gastrointestinal: Negative for abdominal pain, constipation, diarrhea, nausea and vomiting.   Endocrine: Negative for heat intolerance, polydipsia and polyphagia.   Genitourinary: Negative for dysuria, flank pain, frequency, hematuria and urgency.   Musculoskeletal: Negative for arthralgias, back pain, gait problem, joint swelling, myalgias, neck pain and neck stiffness.   Skin: Negative for color change, pallor and rash.   Allergic/Immunologic: Negative for immunocompromised state.   Neurological: Positive for dizziness, headache and memory problem. Negative for seizures, speech difficulty, weakness and numbness.   Hematological: Negative for adenopathy.   Psychiatric/Behavioral: Positive for decreased concentration. Negative for agitation, sleep disturbance, suicidal ideas and depressed mood. The patient is not  "nervous/anxious.        Current Outpatient Medications on File Prior to Visit   Medication Sig   • estradiol (Estrace) 0.5 MG tablet Take 1 tablet by mouth Daily.   • FLUoxetine (PROzac) 20 MG capsule TAKE ONE CAPSULE BY MOUTH DAILY   • methocarbamol (ROBAXIN) 750 MG tablet Take 1 tablet by mouth 3 (Three) Times a Day As Needed for Muscle Spasms.   • Multiple Vitamins-Minerals (PRESERVISION AREDS 2+MULTI VIT PO) Take 1 tablet by mouth Daily.   • olmesartan (Benicar) 40 MG tablet Take 1 tablet by mouth Every Night.   • Progesterone (PROMETRIUM) 200 MG capsule Take 1 capsule by mouth Daily.   • spironolactone (ALDACTONE) 100 MG tablet Take 1 tablet by mouth Daily.     No current facility-administered medications on file prior to visit.       Objective   Vitals:    03/10/22 1443   BP: 122/76   Pulse: 67   Resp: 16   Temp: 98.4 °F (36.9 °C)   TempSrc: Temporal   SpO2: 98%   Weight: 76.2 kg (168 lb)   Height: 167.6 cm (66\")     Body mass index is 27.12 kg/m².    Physical Exam  Vitals reviewed.   Constitutional:       Appearance: Normal appearance. She is well-developed.   HENT:      Head: Normocephalic and atraumatic.      Nose: Nose normal.   Eyes:      General: Lids are normal.      Conjunctiva/sclera: Conjunctivae normal.      Pupils: Pupils are equal, round, and reactive to light.   Neck:      Thyroid: No thyromegaly.      Trachea: Trachea normal.   Pulmonary:      Effort: Pulmonary effort is normal. No respiratory distress.   Skin:     General: Skin is warm and dry.   Neurological:      Mental Status: She is alert and oriented to person, place, and time.      GCS: GCS eye subscore is 4. GCS verbal subscore is 5. GCS motor subscore is 6.   Psychiatric:         Attention and Perception: Attention normal.         Mood and Affect: Mood normal.         Speech: Speech normal.         Behavior: Behavior normal. Behavior is cooperative.       Data/Record Review:   Reviewed scans and imaging from 2/7.    Assessment/Plan "   Problem List Items Addressed This Visit    None     Visit Diagnoses     Concussion syndrome    -  Primary    Relevant Medications    butalbital-acetaminophen-caffeine (Esgic) -40 MG per tablet    ubrogepant (ubrogepant) 100 MG tablet    Other headache syndrome        Relevant Medications    butalbital-acetaminophen-caffeine (Esgic) -40 MG per tablet    ubrogepant (ubrogepant) 100 MG tablet             Current Outpatient Medications:   •  estradiol (Estrace) 0.5 MG tablet, Take 1 tablet by mouth Daily., Disp: 90 tablet, Rfl: 1  •  FLUoxetine (PROzac) 20 MG capsule, TAKE ONE CAPSULE BY MOUTH DAILY, Disp: 90 capsule, Rfl: 3  •  methocarbamol (ROBAXIN) 750 MG tablet, Take 1 tablet by mouth 3 (Three) Times a Day As Needed for Muscle Spasms., Disp: 20 tablet, Rfl: 0  •  Multiple Vitamins-Minerals (PRESERVISION AREDS 2+MULTI VIT PO), Take 1 tablet by mouth Daily., Disp: , Rfl:   •  olmesartan (Benicar) 40 MG tablet, Take 1 tablet by mouth Every Night., Disp: 90 tablet, Rfl: 3  •  Progesterone (PROMETRIUM) 200 MG capsule, Take 1 capsule by mouth Daily., Disp: 90 capsule, Rfl: 1  •  spironolactone (ALDACTONE) 100 MG tablet, Take 1 tablet by mouth Daily., Disp: 90 tablet, Rfl: 1  •  butalbital-acetaminophen-caffeine (Esgic) -40 MG per tablet, Take 1 tablet by mouth Every 4 (Four) Hours As Needed for Headache (Tension headache)., Disp: 12 tablet, Rfl: 0  •  ubrogepant (ubrogepant) 100 MG tablet, Take one tablet by mouth at onset of headache. Can repeat in 2 hours as needed. Do not exceed 2 tablets in one day., Disp: 3 tablet, Rfl: 1       Plan of care reviewed with the patient at the conclusion of today's visit.  Education was provided regarding diagnosis, management, and any prescribed or recommended OTC medications.  Patient verbalized understanding of and agreement with management plan.     Return if symptoms worsen or fail to improve.      Edith Birmingham, APRN

## 2022-03-14 ENCOUNTER — TREATMENT (OUTPATIENT)
Dept: PHYSICAL THERAPY | Facility: CLINIC | Age: 57
End: 2022-03-14

## 2022-03-14 DIAGNOSIS — M54.2 PAIN, NECK: Primary | ICD-10-CM

## 2022-03-14 PROCEDURE — 97112 NEUROMUSCULAR REEDUCATION: CPT | Performed by: PHYSICAL THERAPIST

## 2022-03-14 PROCEDURE — 97110 THERAPEUTIC EXERCISES: CPT | Performed by: PHYSICAL THERAPIST

## 2022-03-14 NOTE — PROGRESS NOTES
Re-Assessment / Re-Certification        Patient: Karthik Ibrahim   : 1965  Diagnosis/ICD-10 Code:  Pain, neck [M54.2]  Referring practitioner: Jimenez Reina MD  Date of Initial Visit: Type: THERAPY  Noted: 2022  Today's Date: 3/14/2022  Patient seen for 8 sessions      Subjective:     Subjective Questionnaire: NDI:   Clinical Progress: improved  Home Program Compliance: Yes  Treatment has included: neuromuscular re-education    Subjective Evaluation    History of Present Illness  Mechanism of injury: Patient reports seeing her PCP and being taken off work for 1 week due to continued headaches after working a full shift, which is being attributed to post-concussive syndrome. She reports significant reduction in headache and pain the next day she took off work.      Patient Occupation:  Pain  Current pain ratin  At best pain ratin  At worst pain ratin  Alleviating factors: rest.  Exacerbated by: writing (hand), working a full shift (headache, fatigue)         Objective          Palpation   Left   Hypertonic in the levator scapulae and sternocleidomastoid.   Tenderness of the sternocleidomastoid and suboccipitals.     Right   Hypertonic in the levator scapulae, sternocleidomastoid and upper trapezius. Tenderness of the sternocleidomastoid, suboccipitals and upper trapezius.     Additional Palpation Details  Joint mobility: increased sensitivity to joint mobility testing  R-sided upper cervical region    Neurological Testing     Additional Neurological Details  ULTTs:   Median: guarding bilaterally headache L  Radial: negative bilaterally  Ulnar: negative L, unclear R    Active Range of Motion   Cervical/Thoracic Spine   Cervical    Flexion: 40 (dizzy) degrees   Extension: 35 (R upper cervical pain) degrees   Left lateral flexion: 28 degrees   Right lateral flexion: 20 degrees with pain  Left rotation: 65 degrees   Right rotation: 60 degrees with pain    Tests   Cervical    Deep neck flexor endurance: 0 (occipital pain and loss of DNF) seconds      Assessment & Plan     Assessment  Impairments: abnormal coordination, abnormal muscle firing, abnormal or restricted ROM, lacks appropriate home exercise program and pain with function  Functional Limitations: sleeping, uncomfortable because of pain and sitting  Assessment details: Patient presents with signs and symptoms 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 Dizziness seems cervicogenic in nature.     3/14- Patient was initially limited by poor tolerance to cervical movement and cognitive processing issues secondary to post-concussive syndrome. She was assessed by a vestibular-trained PT and habituation/processing activities were initiated as she became dizzy, struggled with vision, and reported nausea/headache with these tests. She has demonstrated improvements in visual processing and ability to perform more complex tasks with slower onset of symptoms. Patient's PCP has placed her on work leave for 7 days to allow cognitive rest.    Barriers to therapy: acuity  Prognosis: good    Goals  Plan Goals: Short Term Goals 1 weeks  1. Patient to be compliant with initial HEP. MET  2. Patient to demonstrate pain free cervical AROM without shakiness. 75% met    Long Term Goals 3 weeks  1. Patient to be independent with HEP. 50% MET  2. Patient to demonstrate full pain free cervical AROM. 50% MET  3. Patient to return to work without increased symptoms. NOT MET  4. Patient to report resolution of dizziness. 25% MET  5. Patient to improve NDI score to < 25%. NOT MET      Plan  Therapy options: will be seen for skilled therapy services  Planned modality interventions: cryotherapy and thermotherapy (hydrocollator packs)  Planned therapy interventions: manual therapy, motor coordination training, neuromuscular re-education,  postural training, soft tissue mobilization, spinal/joint mobilization, strengthening, joint mobilization, home exercise program, functional ROM exercises, balance/weight-bearing training and therapeutic activities  Frequency: 2x week  Duration in weeks: 4  Treatment plan discussed with: patient  Plan details: 2x/week for 4 weeks        Progress toward previous goals: Not Met          Timeframe: 2 months  Prognosis to achieve goals: fair    PT Signature: Reji Carrillo, PT      Based upon review of the patient's progress and continued therapy plan, it is my medical opinion that Karthik Ibrahim should continue physical therapy treatment at [facility]  [address] [place] [id number] [phone].    Signature: __________________________________  Jimenez Reina MD    Timed:  Manual Therapy:    0     mins  88000;  Therapeutic Exercise:    25     mins  48482;     Neuromuscular Santi:    25    mins  62798;    Therapeutic Activity:     0     mins  67321;     Gait Trainin     mins  37878;     Ultrasound:     0     mins  25182;    Electrical Stimulation:    0     mins  83221 ( );    Untimed:  Electrical Stimulation:    0     mins  55629 ( );  Mechanical Traction:    0     mins  21152;     Timed Treatment:   50   mins   Total Treatment:     50   mins

## 2022-03-17 ENCOUNTER — TREATMENT (OUTPATIENT)
Dept: PHYSICAL THERAPY | Facility: CLINIC | Age: 57
End: 2022-03-17

## 2022-03-17 DIAGNOSIS — M54.2 PAIN, NECK: Primary | ICD-10-CM

## 2022-03-17 PROCEDURE — 97112 NEUROMUSCULAR REEDUCATION: CPT | Performed by: PHYSICAL THERAPIST

## 2022-03-17 PROCEDURE — 97110 THERAPEUTIC EXERCISES: CPT | Performed by: PHYSICAL THERAPIST

## 2022-03-17 NOTE — PROGRESS NOTES
Physical Therapy Daily Treatment Note      Patient: Karthik Ibrahim   : 1965  Referring practitioner: Jimenez Reina MD  Date of Initial Visit: Type: THERAPY  Noted: 2022  Today's Date: 3/17/2022  Patient seen for 9 sessions       Visit Diagnoses:    ICD-10-CM ICD-9-CM   1. Pain, neck  M54.2 723.1       Subjective   Patient reports she continues to feel like she is improving.  She notes she hasn't worked this week, which has helped.  She still has dificulty focusing and with mental processing at times.  She is still intermittently dizzy and having HA's.    Objective   See Exercise, Manual, and Modality Logs for complete treatment.       Assessment & Plan     Assessment    Assessment details: Patient is maximally challenged with higher level processing tasks including occasional LOB.  Her symptom irritability is improving and she is tolerating higher level tasks.    Plan  Plan details: Progress processing tasks.  Add walking with eyes closed/open with head turns, continue call out tasks.          Timed:         Manual Therapy:    0     mins  56517;     Therapeutic Exercise:    10     mins  77775;     Neuromuscular Santi:    30    mins  30156;    Therapeutic Activity:     0     mins  12456;     Gait Trainin     mins  71435;     Ultrasound:     0     mins  61815;    Ionto                               0    mins   85581  Self Care                       0     mins   30203  Canalith Repos    0     mins 20198      Un-Timed:  Electrical Stimulation:    0     mins  73443 ( );  Dry Needling     0     mins self-pay  Traction     0     mins 95800      Timed Treatment:   40   mins   Total Treatment:     40   mins    Wallace Deejsus, PT  [place] License: [id number]

## 2022-03-21 ENCOUNTER — TREATMENT (OUTPATIENT)
Dept: PHYSICAL THERAPY | Facility: CLINIC | Age: 57
End: 2022-03-21

## 2022-03-21 DIAGNOSIS — M54.2 PAIN, NECK: Primary | ICD-10-CM

## 2022-03-21 PROCEDURE — 97110 THERAPEUTIC EXERCISES: CPT | Performed by: PHYSICAL THERAPIST

## 2022-03-21 PROCEDURE — 97112 NEUROMUSCULAR REEDUCATION: CPT | Performed by: PHYSICAL THERAPIST

## 2022-03-21 NOTE — PROGRESS NOTES
Physical Therapy Daily Progress Note        Patient: Karthik Ibrahim   : 1965  Diagnosis/ICD-10 Code:  Pain, neck [M54.2]  Referring practitioner: Jimenez Reina MD  Date of Initial Visit: Type: THERAPY  Noted: 2022  Today's Date: 3/21/2022  Patient seen for 10 sessions           Patient reports: not needing to take headache medication all week during her week off, though she would occasionally experience a mild headache at night. 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      Objective   See Exercise, Manual, and Modality Logs for complete treatment.       Assessment/Plan  Progressed vestibular habituation exercises by reducing stability. She was instructed to perform each exercise until onset of symptoms, which occurred gradually sooner as she progressed through treatment.             Timed:  Manual Therapy:    0     mins  56924;  Therapeutic Exercise:    8     mins  77091;     Neuromuscular Santi:   32    mins  39467;    Therapeutic Activity:     0     mins  84621;     Gait Trainin     mins  52415;     Ultrasound:     0     mins  07570;    Electrical Stimulation:    0     mins  71375 ( );    Untimed:  Electrical Stimulation:    0     mins  17626 ( );  Mechanical Traction:    0     mins  68815;     Timed Treatment:   40   mins   Total Treatment:     40   mins    Reji Carrillo PT  Physical Therapist

## 2022-03-24 ENCOUNTER — TREATMENT (OUTPATIENT)
Dept: PHYSICAL THERAPY | Facility: CLINIC | Age: 57
End: 2022-03-24

## 2022-03-24 DIAGNOSIS — M54.2 PAIN, NECK: Primary | ICD-10-CM

## 2022-03-24 PROCEDURE — 97110 THERAPEUTIC EXERCISES: CPT | Performed by: PHYSICAL THERAPIST

## 2022-03-24 PROCEDURE — 97112 NEUROMUSCULAR REEDUCATION: CPT | Performed by: PHYSICAL THERAPIST

## 2022-03-24 NOTE — PROGRESS NOTES
Physical Therapy Daily Treatment Note      Patient: Karthik Ibrahim   : 1965  Referring practitioner: Jimenez Reina MD  Date of Initial Visit: Type: THERAPY  Noted: 2022  Today's Date: 3/24/2022  Patient seen for 11 sessions       Visit Diagnoses:    ICD-10-CM ICD-9-CM   1. Pain, neck  M54.2 723.1       Subjective   Patient reports that her symptoms seem to be worse with return to work.  She reports increased jaw pain and HA's and she continues to feel more dizzy and exhausted.  her pain level is 4-5/10 upon arrival.      Objective   TTP over the right anterior cervical muscles.    See Exercise, Manual, and Modality Logs for complete treatment.       Assessment & Plan     Assessment    Assessment details: Patient is experienced higher levels of symptom provocation with her eyes up, specifically with ball tossing and with reading from the ceiling.  Symptoms consistently improve with postural correction during activities.  She expresses desire to return to tennis and started to make exercises more functional regarding this.  Would like to progress to more activities involving hand-eye coordination increasing in complexity.  She will continue to benefit from addressing muscular/joint pains as well.          Timed:         Manual Therapy:    0     mins  52170;     Therapeutic Exercise:    8     mins  67559;     Neuromuscular Santi:    32    mins  48098;    Therapeutic Activity:     0     mins  50644;     Gait Trainin     mins  05825;     Ultrasound:     0     mins  30283;    Ionto                               0    mins   90381  Self Care                       0     mins   23378  Canalith Repos    0     mins 91952      Un-Timed:  Electrical Stimulation:    0     mins  44733 ( );  Dry Needling     0     mins self-pay  Traction     0     mins 25974      Timed Treatment:   40   mins   Total Treatment:     40   mins    Wallace Dejesus, PT  [place] License: [id number]

## 2022-04-04 ENCOUNTER — TREATMENT (OUTPATIENT)
Dept: PHYSICAL THERAPY | Facility: CLINIC | Age: 57
End: 2022-04-04

## 2022-04-04 DIAGNOSIS — M54.2 PAIN, NECK: Primary | ICD-10-CM

## 2022-04-04 PROCEDURE — 97112 NEUROMUSCULAR REEDUCATION: CPT | Performed by: PHYSICAL THERAPIST

## 2022-04-04 NOTE — PROGRESS NOTES
"Physical Therapy Daily Treatment Note      Patient: Karthik Ibrahim   : 1965  Referring practitioner: Jimenez Reina MD  Date of Initial Visit: Type: THERAPY  Noted: 2022  Today's Date: 2022  Patient seen for 12 sessions       Visit Diagnoses:    ICD-10-CM ICD-9-CM   1. Pain, neck  M54.2 723.1       Subjective   Patient reports that she was on vacation last week.  Her symptoms were reduced overall the entire week.  Upon return to work this morning she notes increased pain along the TMJ with HA's.  She notes subjective improvement in her \"brain fog\" and that she also has been less dizzy with activities.  her pain level is 5/10 upon arrival.      Objective   See Exercise, Manual, and Modality Logs for complete treatment.       Assessment & Plan     Assessment    Assessment details: Patient continues with signs of TMJD and could benefit from a referral to orofacial pain clinic regarding this.  In regards to her neck pain and dizziness, she has improved overall tolerance to exercises.  Her symptoms of dizziness were more specific today and related primarily to eye movements overhead.  Recommend her to continue vestibular habituation activities but also recommend resumption of tennis activities, recognizing likelihood of increased symptoms performing serves.      Plan  Plan details: Reassessment due.  Continue progressing VHE and cervical/scapular stabilization.           Timed:         Manual Therapy:    0     mins  65363;     Therapeutic Exercise:    0     mins  24009;     Neuromuscular Santi:    42    mins  22481;    Therapeutic Activity:     0     mins  13772;     Gait Trainin     mins  21689;     Ultrasound:     0     mins  19038;    Ionto                               0    mins   58155  Self Care                       0     mins   21500  Canalith Repos    0     mins 95015      Un-Timed:  Electrical Stimulation:    0     mins  39684 ( );  Dry Needling     0     mins " self-pay  Traction     0     mins 13705      Timed Treatment:   42   mins   Total Treatment:     42   mins    Wallace Dejesus, PT  [place] License: [id number]

## 2022-04-05 ENCOUNTER — TRANSCRIBE ORDERS (OUTPATIENT)
Dept: PHYSICAL THERAPY | Facility: CLINIC | Age: 57
End: 2022-04-05

## 2022-04-07 ENCOUNTER — TREATMENT (OUTPATIENT)
Dept: PHYSICAL THERAPY | Facility: CLINIC | Age: 57
End: 2022-04-07

## 2022-04-07 DIAGNOSIS — M54.2 PAIN, NECK: Primary | ICD-10-CM

## 2022-04-07 PROCEDURE — 97110 THERAPEUTIC EXERCISES: CPT | Performed by: PHYSICAL THERAPIST

## 2022-04-07 PROCEDURE — 97112 NEUROMUSCULAR REEDUCATION: CPT | Performed by: PHYSICAL THERAPIST

## 2022-04-07 NOTE — PROGRESS NOTES
"Re-Assessment / Re-Certification        Patient: Karthik Ibrahim   : 1965  Diagnosis/ICD-10 Code:  Pain, neck [M54.2]  Referring practitioner: Jimenez Reina MD  Date of Initial Visit: Type: THERAPY  Noted: 2022  Today's Date: 2022  Patient seen for 13 sessions      Subjective:     Subjective Questionnaire: NDI: 15/45  Clinical Progress: improved  Home Program Compliance: Yes  Treatment has included: therapeutic exercise, neuromuscular re-education, manual therapy and therapeutic activity    Subjective Evaluation    History of Present Illness  Date of onset: 2022    Subjective comment: Patient reports noticing more issues processing through normal activities. For example, she has stopped at a street that did not have a stop sign before realizing her mistake, she has \"found herself in reverse\" and struggled to switch to forward drive, and she will attempt to make a phone call only to find herself on the text screen.  Patient Occupation:    Precautions and Work Restrictions: unrestricted, returned full time this weekPain  Current pain ratin  At best pain ratin  At worst pain ratin  Alleviating factors: rest.  Exacerbated by: excessive activity or thinking.         Objective          Palpation   Left   Hypertonic in the sternocleidomastoid.     Right   Hypertonic in the sternocleidomastoid, suboccipitals and upper trapezius. Tenderness of the sternocleidomastoid, suboccipitals and upper trapezius.     Active Range of Motion   Cervical/Thoracic Spine   Cervical    Flexion: 40 degrees   Extension: 35 degrees   Left lateral flexion: 32 degrees   Right lateral flexion: 18 degrees   Left rotation: 65 degrees   Right rotation: 55 degrees     Tests   Cervical   Deep neck flexor endurance: 0 (immediate loss of DNF with pressure in head) seconds      Assessment & Plan     Assessment  Impairments: abnormal coordination, abnormal muscle firing, abnormal or restricted ROM, lacks " appropriate home exercise program and pain with function  Functional Limitations: sleeping, uncomfortable because of pain and sitting  Assessment details: Patient presents with signs and symptoms 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 Dizziness seems cervicogenic in nature.     3/14- Patient was initially limited by poor tolerance to cervical movement and cognitive processing issues secondary to post-concussive syndrome. She was assessed by a vestibular-trained PT and habituation/processing activities were initiated as she became dizzy, struggled with vision, and reported nausea/headache with these tests. She has demonstrated improvements in visual processing and ability to perform more complex tasks with slower onset of symptoms. Patient's PCP has placed her on work leave for 7 days to allow cognitive rest.    4/7- Patient has received 13 PT visits. Cervical AROM is much less sensitive/painful but she continues to demonstrate significant muscle guarding with intrinsic weakness. The main focus of recent PT has been cognitive and kinesthetic processing, as she struggled with multi-tasking, coordination, and balance while reporting intermittent dizziness and headache once she reaches her fatigue point.     Barriers to therapy: acuity  Prognosis: good    Goals  Plan Goals: Short Term Goals 1 weeks  1. Patient to be compliant with initial HEP. MET  2. Patient to demonstrate pain free cervical AROM without shakiness. MET    Long Term Goals 3 weeks  1. Patient to be independent with HEP. 50% MET  2. Patient to demonstrate full pain free cervical AROM. MET  3. Patient to return to work without increased symptoms. NOT MET  4. Patient to report resolution of dizziness. 25% MET  5. Patient to improve NDI score to < 25%. NOT MET      Plan  Therapy options: will be seen for skilled therapy  services  Planned modality interventions: cryotherapy and thermotherapy (hydrocollator packs)  Planned therapy interventions: manual therapy, motor coordination training, neuromuscular re-education, postural training, soft tissue mobilization, spinal/joint mobilization, strengthening, joint mobilization, home exercise program, functional ROM exercises, balance/weight-bearing training, therapeutic activities and IADL retraining  Frequency: 2x week  Duration in weeks: 4  Treatment plan discussed with: patient  Plan details: 2x/week for 4 weeks        Progress toward previous goals: Partially Met        Timeframe: 1 month  Prognosis to achieve goals: good    PT Signature: Reji Carrillo PT      Based upon review of the patient's progress and continued therapy plan, it is my medical opinion that Karthik Ibrahim should continue physical therapy treatment at [facility] PH[facility] TH[facility]  [address] [place] [id number] [phone].    Signature: __________________________________  Jimenez Reina MD    Timed:  Manual Therapy:    0     mins  25389;  Therapeutic Exercise:    15     mins  51347;     Neuromuscular Santi:    25    mins  07597;    Therapeutic Activity:     0     mins  89800;     Gait Trainin     mins  02290;     Ultrasound:     0     mins  81902;    Electrical Stimulation:    0     mins  89133 ( );    Untimed:  Electrical Stimulation:    0     mins  24148 ( );  Mechanical Traction:    0     mins  32518;     Timed Treatment:   40   mins   Total Treatment:     40   mins

## 2022-04-11 ENCOUNTER — TREATMENT (OUTPATIENT)
Dept: PHYSICAL THERAPY | Facility: CLINIC | Age: 57
End: 2022-04-11

## 2022-04-11 ENCOUNTER — OFFICE VISIT (OUTPATIENT)
Dept: OBSTETRICS AND GYNECOLOGY | Facility: CLINIC | Age: 57
End: 2022-04-11

## 2022-04-11 DIAGNOSIS — N95.0 POSTMENOPAUSAL BLEEDING: Primary | ICD-10-CM

## 2022-04-11 DIAGNOSIS — M54.2 PAIN, NECK: Primary | ICD-10-CM

## 2022-04-11 PROCEDURE — 58340 CATHETER FOR HYSTEROGRAPHY: CPT | Performed by: OBSTETRICS & GYNECOLOGY

## 2022-04-11 PROCEDURE — 97530 THERAPEUTIC ACTIVITIES: CPT | Performed by: PHYSICAL THERAPIST

## 2022-04-11 PROCEDURE — 97112 NEUROMUSCULAR REEDUCATION: CPT | Performed by: PHYSICAL THERAPIST

## 2022-04-11 NOTE — PROGRESS NOTES
Saline Infusion Sonogram     Date of procedure:  April 11, 2022     Risks and benefits were discussed.  All questions were answered.  Consents given by the patient verbally.     Pre-op indication:  1. Post-menopausal bleeding  2. Inconclusive TV ultrasound  3. Mary Alice Dunn patient     Procedure documentation:    After the patient was identified and informed consent given she was placed in dorsal lithotomy position in stirrups and draped. A sterile speculum was placed inside the vagina with good visualization of the cervix and the cervix was cleaned with Betadine swabs.  The cervix was grasped with a single-tooth tenaculum.  A balloon catheter was introduced through the cervix without complication and inflated. The speculum was removed. The uterine cavity was then evaluated with transvaginal ultrasonography while saline was being instilled.    The findings were as follows:  · The bilayer endometrial stripe measured < 3 mm.  · Focal lesions were absent    The balloon was then released and the cavity was then drained of saline and the catheter was removed. Scant bleeding was noted from the cervical lip and the procedure was completed. The patient tolerated the procedure well and was given follow-up instructions.      Impression: 1. Normal cavity without focal findings & with a thin uniform endometrium.  Significant endometrial pathology is unlikely   Plan: 1. No additional workup is needed     This note was electronically signed.    Xavi Fabian M.D.  April 11, 2022

## 2022-04-11 NOTE — PROGRESS NOTES
"Physical Therapy Daily Treatment Note      Patient: Karthik Ibrahim   : 1965  Referring practitioner: No ref. provider found  Date of Initial Visit: Type: THERAPY  Noted: 2022  Today's Date: 2022  Patient seen for 14 sessions       Visit Diagnoses:    ICD-10-CM ICD-9-CM   1. Pain, neck  M54.2 723.1       Subjective   Patient reports that she is doing better overall.  Her episodes of concentration loss and dizzy symptoms are decreasing in frequency and intensity.  She still notes cervical spine tenderness and irritability when she is tense.  She notes her symptoms \"cascade\" when she has them, which she attributes to anxiety that surrounds her symptoms.  her pain level is 2/10 upon arrival.      Objective   Normal smooth pursuit, saccade, VOR 1 regarding eye movements, mild symptom exacerbation.      See Exercise, Manual, and Modality Logs for complete treatment.       Assessment & Plan     Assessment    Assessment details: Objectively, Karthik's eye movements and objective signs of vestibular dysfunction are decreasing. Subjectively, she reports reduced frequency and intensity of her symptoms. She has restarted tennis activities in the last week but has not performed serving activities.  We simulated this today, and her symptoms were controlled.  We discussed full participation in tennis this week.  As she has returned to participation, her symptoms have trended towards improvement.     Plan  Plan details: Progress as tolerated, beginning to transition to more cervical treatments and transition to more independent vestibular management.           Timed:         Manual Therapy:    0     mins  42250;     Therapeutic Exercise:    0     mins  90506;     Neuromuscular Santi:    27    mins  48562;    Therapeutic Activity:     15     mins  25561;     Gait Trainin     mins  27365;     Ultrasound:     0     mins  62633;    Ionto                               0    mins   26495  Self Care              "          0     mins   98655  Canalith Repos    0     mins 37876      Un-Timed:  Electrical Stimulation:    0     mins  46907 ( );  Dry Needling     0     mins self-pay  Traction     0     mins 89202      Timed Treatment:   42   mins   Total Treatment:     42   mins    Wallace Dejesus, PT  [place] License: [id number]

## 2022-04-13 ENCOUNTER — TREATMENT (OUTPATIENT)
Dept: PHYSICAL THERAPY | Facility: CLINIC | Age: 57
End: 2022-04-13

## 2022-04-13 DIAGNOSIS — M54.2 PAIN, NECK: Primary | ICD-10-CM

## 2022-04-13 PROCEDURE — 97112 NEUROMUSCULAR REEDUCATION: CPT | Performed by: PHYSICAL THERAPIST

## 2022-04-13 PROCEDURE — 97012 MECHANICAL TRACTION THERAPY: CPT | Performed by: PHYSICAL THERAPIST

## 2022-04-13 NOTE — PROGRESS NOTES
Physical Therapy Daily Progress Note        Patient: Karthik Ibrahim   : 1965  Diagnosis/ICD-10 Code:  Pain, neck [M54.2]  Referring practitioner: No ref. provider found  Date of Initial Visit: Type: THERAPY  Noted: 2022  Today's Date: 2022  Patient seen for 15 sessions           Patient reports: playing tennis yesterday with good tolerance though she continues to struggle with processing and evening headaches.       Objective   See Exercise, Manual, and Modality Logs for complete treatment.       Assessment/Plan  Patient struggled with walking fwd with eyes closed today, eventually reporting headache when working on this skill. Much of tension headache seems related to guarding of B SCMs.     Continued with dynamic balance, hand-eye coordination, habituation, and cognitive processing activities.            Timed:  Manual Therapy:    8     mins  90281;  Therapeutic Exercise:    0     mins  92887;     Neuromuscular Santi:   32    mins  57587;    Therapeutic Activity:     0     mins  48037;     Gait Trainin     mins  65684;     Ultrasound:     0     mins  44936;    Electrical Stimulation:    0     mins  76228 ( );    Untimed:  Electrical Stimulation:    0     mins  43436 ( );  Mechanical Traction:    0     mins  29095;     Timed Treatment:   40   mins   Total Treatment:     40   mins    Reji Carrillo PT  Physical Therapist

## 2022-04-18 ENCOUNTER — TREATMENT (OUTPATIENT)
Dept: PHYSICAL THERAPY | Facility: CLINIC | Age: 57
End: 2022-04-18

## 2022-04-18 DIAGNOSIS — M54.2 PAIN, NECK: Primary | ICD-10-CM

## 2022-04-18 PROCEDURE — 97112 NEUROMUSCULAR REEDUCATION: CPT | Performed by: PHYSICAL THERAPIST

## 2022-04-18 NOTE — PROGRESS NOTES
"Physical Therapy Daily Treatment Note      Patient: Karthik Ibrahim   : 1965  Referring practitioner: Jimenez Reina MD  Date of Initial Visit: Type: THERAPY  Noted: 2022  Today's Date: 2022  Patient seen for 16 sessions       Visit Diagnoses:    ICD-10-CM ICD-9-CM   1. Pain, neck  M54.2 723.1       Subjective   Patient reports that most of her symptoms are improving significantly.  She still has \"brain fog\" when she is in busy enviroments and she still finds her symptoms exacerbated after she encounters difficulties.  She drove in the rain this morning, notes the [employer] wipers were stimulating.  She has resumed full tennis participation, didn't have a lot of issues there.  her pain level is 0/10 upon arrival.      Objective   See Exercise, Manual, and Modality Logs for complete treatment.       Assessment & Plan     Assessment    Assessment details: Patient is maximally challenged with turning circles looking up.  Otherwise, she tolerates most activities well today.  Utilized tall mirror to stimulate the peripheral vision today, which did increase difficulty with certain movements and activities.  She doesn't report much pain, overall.  She is still having delayed processing with complex tasks.     Plan  Plan details: Add varying speed activities.           Timed:         Manual Therapy:    0     mins  87265;     Therapeutic Exercise:    0     mins  77192;     Neuromuscular Santi:    40    mins  49607;    Therapeutic Activity:     0     mins  42723;     Gait Trainin     mins  78941;     Ultrasound:     0     mins  92060;    Ionto                               0    mins   50644  Self Care                       0     mins   52689  Canalith Repos    0     mins 54835      Un-Timed:  Electrical Stimulation:    0     mins  81453 ( );  Dry Needling     0     mins self-pay  Traction     0     mins 69604      Timed Treatment:   40   mins   Total Treatment:     40   mins    Wallace RAMIREZ" Oleg, PT  [place] License: [id number]

## 2022-04-25 ENCOUNTER — TREATMENT (OUTPATIENT)
Dept: PHYSICAL THERAPY | Facility: CLINIC | Age: 57
End: 2022-04-25

## 2022-04-25 DIAGNOSIS — M54.2 PAIN, NECK: Primary | ICD-10-CM

## 2022-04-25 PROCEDURE — 97112 NEUROMUSCULAR REEDUCATION: CPT | Performed by: PHYSICAL THERAPIST

## 2022-04-25 NOTE — PROGRESS NOTES
"Physical Therapy Daily Treatment Note      Patient: Karthik Ibrahim   : 1965  Referring practitioner: Jimenez Reina MD  Date of Initial Visit: Type: THERAPY  Noted: 2022  Today's Date: 2022  Patient seen for 17 sessions       Visit Diagnoses:    ICD-10-CM ICD-9-CM   1. Pain, neck  M54.2 723.1       Subjective   Patient reports she is noting less and less frequent symptoms.  She notes when she is in a vehicle and has anterior/posterior movement she feels unsteady/panicked.  She notes most of her symptoms are improving but when she has an exacerbation it triggers anxiety and \"cascades\".  Her HA symptoms are more stable, as well as pain.  She follows up with her dentist after this regarding her bite guard (which she thinks has helped).  her pain level is 2/10 upon arrival.      Objective   Dizzy symptoms with hallway activities, cutting eyes to the left.    See Exercise, Manual, and Modality Logs for complete treatment.       Assessment & Plan     Assessment    Assessment details: Patient is challenged with peripheral vision activities, but mostly with standing eye movements to the left.  She is progressing well and her symptoms are becoming more specific to certain activities.  She is likely progressing to a point where she can reduce in clinic frequency to allow more time to work on home exercises.  Will confer with co-treating therapist and determine need for additional care treating the pain symptoms.  She also reports that her HA symptoms increase with eye movements as well.     Plan  Plan details: Assess appropriateness to reduce to once/wk treatments focusing primarily on vestibular/concussion symptoms.           Timed:         Manual Therapy:    0     mins  59242;     Therapeutic Exercise:    0     mins  38079;     Neuromuscular Santi:    40    mins  73894;    Therapeutic Activity:     0     mins  86878;     Gait Trainin     mins  77493;     Ultrasound:     0     mins  38237;  "   Ionto                               0    mins   55307  Self Care                       0     mins   69368  Canalith Repos    0     mins 01694      Un-Timed:  Electrical Stimulation:    0     mins  03808 ( );  Dry Needling     0     mins self-pay  Traction     0     mins 35298      Timed Treatment:   40   mins   Total Treatment:     40   mins    Wallace Dejesus, PT  [place] License: [id number]

## 2022-04-28 ENCOUNTER — TREATMENT (OUTPATIENT)
Dept: PHYSICAL THERAPY | Facility: CLINIC | Age: 57
End: 2022-04-28

## 2022-04-28 DIAGNOSIS — M54.2 PAIN, NECK: Primary | ICD-10-CM

## 2022-04-28 PROCEDURE — 97110 THERAPEUTIC EXERCISES: CPT | Performed by: PHYSICAL THERAPIST

## 2022-04-28 PROCEDURE — 97112 NEUROMUSCULAR REEDUCATION: CPT | Performed by: PHYSICAL THERAPIST

## 2022-04-28 NOTE — PROGRESS NOTES
Re-Assessment / Re-Certification        Patient: Karthik Ibrahim   : 1965  Diagnosis/ICD-10 Code:  Pain, neck [M54.2]  Referring practitioner: Jimenez Reina MD  Date of Initial Visit: Type: THERAPY  Noted: 2022  Today's Date: 2022  Patient seen for 18 sessions      Subjective:     Subjective Questionnaire: NDI:   Clinical Progress: improved  Home Program Compliance: Yes  Treatment has included: therapeutic exercise, neuromuscular re-education and therapeutic activity    Subjective Evaluation    History of Present Illness  Date of onset: 2022    Subjective comment: Patient reports noticing less frequent processing issues, but they still happen.  Patient Occupation:    Precautions and Work Restrictions: unrestricted, returned full time this weekPain  Current pain ratin  At best pain ratin  At worst pain ratin  Quality: tight and pulling  Alleviating factors: rest.  Exacerbated by: excessive activity or thinking.         Objective          Palpation   Left   Hypertonic in the sternocleidomastoid.     Right   Hypertonic in the sternocleidomastoid and upper trapezius. Tenderness of the suboccipitals and upper trapezius.     Active Range of Motion   Cervical/Thoracic Spine   Cervical    Flexion: 40 degrees   Extension: 40 (posterior neck pain) degrees   Left lateral flexion: 32 degrees   Right lateral flexion: 20 degrees   Left rotation: 65 degrees   Right rotation: 50 degrees     Tests   Cervical   Deep neck flexor endurance: 1 (near-immediate loss of DNF with pressure in head) seconds      Assessment & Plan     Assessment  Impairments: abnormal coordination, abnormal muscle firing, abnormal or restricted ROM, lacks appropriate home exercise program and pain with function  Functional Limitations: sleeping, uncomfortable because of pain and sitting  Assessment details: Patient presents with signs and symptoms consistent with 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 Dizziness seems cervicogenic in nature.     3/14- Patient was initially limited by poor tolerance to cervical movement and cognitive processing issues secondary to post-concussive syndrome. She was assessed by a vestibular-trained PT and habituation/processing activities were initiated as she became dizzy, struggled with vision, and reported nausea/headache with these tests. She has demonstrated improvements in visual processing and ability to perform more complex tasks with slower onset of symptoms. Patient's PCP has placed her on work leave for 7 days to allow cognitive rest.    4/7- Patient has received 13 PT visits. Cervical AROM is much less sensitive/painful but she continues to demonstrate significant muscle guarding with intrinsic weakness. The main focus of recent PT has been cognitive and kinesthetic processing, as she struggled with multi-tasking, coordination, and balance while reporting intermittent dizziness and headache once she reaches her fatigue point.     4/28- Patient has received 17 PT visits. Continued cervical intrinsic weakness due to onset of tension headache noted with testing. She continues to poorly tolerate direct cervical spine interventions due to severe reflexive guarding, so it was decided to abandon these and continue focus on cognitive processing and vestibular retraining, which she has responded well with.     Barriers to therapy: acuity  Prognosis: good    Goals  Plan Goals: Short Term Goals 1 weeks  1. Patient to be compliant with initial HEP. MET  2. Patient to demonstrate pain free cervical AROM without shakiness. MET    Long Term Goals 3 weeks  1. Patient to be independent with HEP. 75% MET  2. Patient to demonstrate full pain free cervical AROM. MET  3. Patient to return to work without increased symptoms. NOT MET  4. Patient to report resolution of dizziness. 25%  MET  5. Patient to improve NDI score to < 25%. 90% MET      Plan  Therapy options: will be seen for skilled therapy services  Planned modality interventions: cryotherapy, thermotherapy (hydrocollator packs) and TENS  Planned therapy interventions: motor coordination training, neuromuscular re-education, postural training, home exercise program, functional ROM exercises, balance/weight-bearing training, therapeutic activities, IADL retraining and ADL retraining  Frequency: 1x week  Duration in weeks: 4  Treatment plan discussed with: patient  Plan details: 1x/week for 4 weeks        Progress toward previous goals: Partially Met        Timeframe: 1 month  Prognosis to achieve goals: good    PT Signature: Reji Carrillo PT      Based upon review of the patient's progress and continued therapy plan, it is my medical opinion that Karthik Ibrahim should continue physical therapy treatment at [facility]  [address] [place] [id number] [phone].    Signature: __________________________________  Jimenez Reina MD    Timed:  Manual Therapy:    0     mins  87030;  Therapeutic Exercise:    15     mins  19580;     Neuromuscular Santi:    23    mins  92701;    Therapeutic Activity:     0     mins  42947;     Gait Trainin     mins  85001;     Ultrasound:     0     mins  12495;    Electrical Stimulation:    0     mins  75512 ( );    Untimed:  Electrical Stimulation:    0     mins  59091 ( );  Mechanical Traction:    0     mins  61268;     Timed Treatment:   38   mins   Total Treatment:     38   mins

## 2022-05-03 ENCOUNTER — TRANSCRIBE ORDERS (OUTPATIENT)
Dept: PHYSICAL THERAPY | Facility: CLINIC | Age: 57
End: 2022-05-03

## 2022-05-05 ENCOUNTER — TREATMENT (OUTPATIENT)
Dept: PHYSICAL THERAPY | Facility: CLINIC | Age: 57
End: 2022-05-05

## 2022-05-05 DIAGNOSIS — M54.2 PAIN, NECK: Primary | ICD-10-CM

## 2022-05-05 PROCEDURE — 97112 NEUROMUSCULAR REEDUCATION: CPT | Performed by: PHYSICAL THERAPIST

## 2022-05-05 NOTE — PROGRESS NOTES
Physical Therapy Daily Treatment Note      Patient: Karthik Ibrahim   : 1965  Referring practitioner: Jimenez Reina MD  Date of Initial Visit: Type: THERAPY  Noted: 2022  Today's Date: 2022  Patient seen for 19 sessions       Visit Diagnoses:    ICD-10-CM ICD-9-CM   1. Pain, neck  M54.2 723.1       Subjective   Patient reports that most her driving symptoms seem to be resolving.  She is playing tennis but notes dizzy symptoms with bending over at times.  Yesterday, she was asked what school a child was from and she was frustrated because she couldn't process and remember.  This made her uneasy and panic.  her pain level is 0/10 upon arrival.      Objective   See Exercise, Manual, and Modality Logs for complete treatment.       Assessment & Plan     Assessment    Assessment details: Patient reports sympathetic response with call-out activities.  She reports she becomes sweaty and uneasy, even though the task isn't hard.  She seems to be reducing most vestibular irritability but still has challenge with mental processing with tasks.  Therefore, we focused on trivial/thinking tasks with complex tasks today.  Advised patient to download a [email or web address] annabelle and perform while cooking/doing other activities.     Plan  Plan details: Assess respond to treatment. Troubleshoot problematic activities, begin d/c planning.          Timed:         Manual Therapy:    0     mins  27317;     Therapeutic Exercise:    0     mins  59453;     Neuromuscular Santi:    39    mins  93303;    Therapeutic Activity:     0     mins  47995;     Gait Trainin     mins  69695;     Ultrasound:     0     mins  99505;    Ionto                               0    mins   70879  Self Care                       0     mins   49711  Canalith Repos    0     mins 72748      Un-Timed:  Electrical Stimulation:    0     mins  69076 ( );  Dry Needling     0     mins self-pay  Traction     0     mins 27698      Timed Treatment:  39    mins   Total Treatment:      39  mins    Wallace Dejesus, PT  [place] License: [id number]

## 2022-05-12 ENCOUNTER — TREATMENT (OUTPATIENT)
Dept: PHYSICAL THERAPY | Facility: CLINIC | Age: 57
End: 2022-05-12

## 2022-05-12 DIAGNOSIS — M54.2 PAIN, NECK: Primary | ICD-10-CM

## 2022-05-12 PROCEDURE — 97140 MANUAL THERAPY 1/> REGIONS: CPT | Performed by: PHYSICAL THERAPIST

## 2022-05-12 PROCEDURE — 97112 NEUROMUSCULAR REEDUCATION: CPT | Performed by: PHYSICAL THERAPIST

## 2022-05-12 NOTE — PROGRESS NOTES
Physical Therapy Daily Treatment Note      Patient: Karthik Ibrahim   : 1965  Referring practitioner: Jimenez Reina MD  Date of Initial Visit: Type: THERAPY  Noted: 2022  Today's Date: 2022  Patient seen for 20 sessions       Visit Diagnoses:    ICD-10-CM ICD-9-CM   1. Pain, neck  M54.2 723.1       Subjective   Patient reports she feels she is doing better overall.  Still has trouble with verbal recall and gets dizzy picking up balls playing tennis.  her pain level is 0/10 upon arrival.      Objective   Patient is dizzy after cervical manual, briefly confused.    Dizzy with seated right Quadrant > L    See Exercise, Manual, and Modality Logs for complete treatment.       Assessment & Plan     Assessment    Assessment details: Attempted to restart treatment towards the cervical spine today.  She does a better job relaxing but triggers significant dizziness post treatment.  She continues with intermittent dizzy symptoms.  There is some concern from a hemodynamics standpoint and recommend her to follow up with PCP to be evaluated there.  She may benefit from additional testing to rule out VBI vs orthostatic hypotension.      Plan  Plan details: Continue vestibular treatment but hold cervical treatments.  Referring back to PCP regarding persistent confusion/lightheadedness.           Timed:         Manual Therapy:    15     mins  30309;     Therapeutic Exercise:    0     mins  95228;     Neuromuscular Santi:    24    mins  11501;    Therapeutic Activity:     0     mins  05462;     Gait Trainin     mins  42197;     Ultrasound:     0     mins  56014;    Ionto                               0    mins   48673  Self Care                       0     mins   82868  Canalith Repos    0     mins 52641      Un-Timed:  Electrical Stimulation:    0     mins  63060 ( );  Dry Needling     0     mins self-pay  Traction     0     mins 04880      Timed Treatment:   39   mins   Total Treatment:     39    mins    Wallace Dejesus, PT  [place] License: [id number]

## 2022-05-16 ENCOUNTER — LAB (OUTPATIENT)
Dept: LAB | Facility: HOSPITAL | Age: 57
End: 2022-05-16

## 2022-05-16 ENCOUNTER — OFFICE VISIT (OUTPATIENT)
Dept: INTERNAL MEDICINE | Facility: CLINIC | Age: 57
End: 2022-05-16

## 2022-05-16 VITALS
SYSTOLIC BLOOD PRESSURE: 120 MMHG | RESPIRATION RATE: 16 BRPM | BODY MASS INDEX: 27 KG/M2 | TEMPERATURE: 98.4 F | DIASTOLIC BLOOD PRESSURE: 76 MMHG | HEIGHT: 66 IN | HEART RATE: 65 BPM | OXYGEN SATURATION: 98 % | WEIGHT: 168 LBS

## 2022-05-16 DIAGNOSIS — R42 DIZZINESS: ICD-10-CM

## 2022-05-16 DIAGNOSIS — F32.9 REACTIVE DEPRESSION: ICD-10-CM

## 2022-05-16 DIAGNOSIS — I10 ESSENTIAL HYPERTENSION: Primary | ICD-10-CM

## 2022-05-16 LAB
ALBUMIN SERPL-MCNC: 4.7 G/DL (ref 3.5–5.2)
ALBUMIN/GLOB SERPL: 2.6 G/DL
ALP SERPL-CCNC: 59 U/L (ref 39–117)
ALT SERPL W P-5'-P-CCNC: 10 U/L (ref 1–33)
ANION GAP SERPL CALCULATED.3IONS-SCNC: 13.5 MMOL/L (ref 5–15)
AST SERPL-CCNC: 22 U/L (ref 1–32)
BILIRUB SERPL-MCNC: 0.5 MG/DL (ref 0–1.2)
BUN SERPL-MCNC: 20 MG/DL (ref 6–20)
BUN/CREAT SERPL: 25.3 (ref 7–25)
CALCIUM SPEC-SCNC: 9.4 MG/DL (ref 8.6–10.5)
CHLORIDE SERPL-SCNC: 101 MMOL/L (ref 98–107)
CO2 SERPL-SCNC: 24.5 MMOL/L (ref 22–29)
CREAT SERPL-MCNC: 0.79 MG/DL (ref 0.57–1)
DEPRECATED RDW RBC AUTO: 46.8 FL (ref 37–54)
EGFRCR SERPLBLD CKD-EPI 2021: 89 ML/MIN/1.73
ERYTHROCYTE [DISTWIDTH] IN BLOOD BY AUTOMATED COUNT: 12.4 % (ref 12.3–15.4)
GLOBULIN UR ELPH-MCNC: 1.8 GM/DL
GLUCOSE SERPL-MCNC: 78 MG/DL (ref 65–99)
HCT VFR BLD AUTO: 38.3 % (ref 34–46.6)
HGB BLD-MCNC: 12.5 G/DL (ref 12–15.9)
MCH RBC QN AUTO: 33.3 PG (ref 26.6–33)
MCHC RBC AUTO-ENTMCNC: 32.6 G/DL (ref 31.5–35.7)
MCV RBC AUTO: 102.1 FL (ref 79–97)
PLATELET # BLD AUTO: 233 10*3/MM3 (ref 140–450)
PMV BLD AUTO: 10.7 FL (ref 6–12)
POTASSIUM SERPL-SCNC: 4.5 MMOL/L (ref 3.5–5.2)
PROT SERPL-MCNC: 6.5 G/DL (ref 6–8.5)
RBC # BLD AUTO: 3.75 10*6/MM3 (ref 3.77–5.28)
SODIUM SERPL-SCNC: 139 MMOL/L (ref 136–145)
TSH SERPL DL<=0.05 MIU/L-ACNC: 1.23 UIU/ML (ref 0.27–4.2)
WBC NRBC COR # BLD: 5.59 10*3/MM3 (ref 3.4–10.8)

## 2022-05-16 PROCEDURE — 82607 VITAMIN B-12: CPT | Performed by: INTERNAL MEDICINE

## 2022-05-16 PROCEDURE — 99214 OFFICE O/P EST MOD 30 MIN: CPT | Performed by: INTERNAL MEDICINE

## 2022-05-16 PROCEDURE — 80050 GENERAL HEALTH PANEL: CPT | Performed by: INTERNAL MEDICINE

## 2022-05-16 RX ORDER — BUPROPION HYDROCHLORIDE 150 MG/1
150 TABLET ORAL EVERY MORNING
Qty: 30 TABLET | Refills: 5 | Status: SHIPPED | OUTPATIENT
Start: 2022-05-16 | End: 2022-11-14

## 2022-05-16 NOTE — PROGRESS NOTES
Patient is a 56 y.o. female who is here for a follow up of   Chief Complaint   Patient presents with   • Dizziness   • Memory Loss         HPI:    Here for mgmt of HTN and depression.  Despite change in diet and exercise has has a gradual wt gain.  Very active.  Energy level is low.  No longer menstruating.  BP has been good.  Occasional dizziness.  Recently added aldactone.      History:     Patient Active Problem List   Diagnosis   • Menstrual migraine without status migrainosus, not intractable   • Abnormal liver enzymes   • Routine general medical examination at a health care facility   • Essential hypertension   • Dizziness   • Reactive depression       Past Medical History:   Diagnosis Date   • Family Hx of osteoporosis MGM/osteopenia mother/patient normal DEXA age 51 2/15/2018   • Headache    • Hyperplastic colon polyp 2011 2/15/2018   • Hypertension        Past Surgical History:   Procedure Laterality Date   • CHOLECYSTECTOMY     • COLONOSCOPY W/ BIOPSIES  2011 and 2014    Hyperplastic 2014   • SKIN CANCER EXCISION      facial moles removed   • TUBAL ABDOMINAL LIGATION     • WISDOM TOOTH EXTRACTION         Current Outpatient Medications on File Prior to Visit   Medication Sig   • butalbital-acetaminophen-caffeine (Esgic) -40 MG per tablet Take 1 tablet by mouth Every 4 (Four) Hours As Needed for Headache (Tension headache).   • estradiol (Estrace) 0.5 MG tablet Take 1 tablet by mouth Daily.   • methocarbamol (ROBAXIN) 750 MG tablet Take 1 tablet by mouth 3 (Three) Times a Day As Needed for Muscle Spasms.   • Multiple Vitamins-Minerals (PRESERVISION AREDS 2+MULTI VIT PO) Take 1 tablet by mouth Daily.   • olmesartan (Benicar) 40 MG tablet Take 1 tablet by mouth Every Night.   • Progesterone (PROMETRIUM) 200 MG capsule Take 1 capsule by mouth Daily.   • spironolactone (ALDACTONE) 100 MG tablet Take 1 tablet by mouth Daily.   • ubrogepant (ubrogepant) 100 MG tablet Take one tablet by mouth at onset of  headache. Can repeat in 2 hours as needed. Do not exceed 2 tablets in one day.   • [DISCONTINUED] FLUoxetine (PROzac) 20 MG capsule TAKE ONE CAPSULE BY MOUTH DAILY     No current facility-administered medications on file prior to visit.       Family History   Problem Relation Age of Onset   • Ovarian cancer Maternal Grandmother 70   • Osteoporosis Maternal Grandmother    • Kidney disease Maternal Grandmother    • Skin cancer Maternal Grandmother    • Colon cancer Maternal Aunt    • Colon cancer Paternal Grandfather    • Osteoporosis Mother    • Hepatitis Father    • Lung cancer Father 57        smoker   • Heart disease Maternal Grandfather    • BRCA 1/2 Neg Hx    • Breast cancer Neg Hx    • Endometrial cancer Neg Hx        Social History     Socioeconomic History   • Marital status:    Tobacco Use   • Smoking status: Former Smoker     Packs/day: 0.50     Years: 13.00     Pack years: 6.50     Quit date:      Years since quittin.3   • Smokeless tobacco: Never Used   Vaping Use   • Vaping Use: Never used   Substance and Sexual Activity   • Alcohol use: Yes     Alcohol/week: 14.0 standard drinks     Types: 14 Glasses of wine per week     Comment: suggest 10 per week max   • Drug use: No   • Sexual activity: Yes     Partners: Male     Birth control/protection: Surgical         Review of Systems   Constitutional: Positive for unexpected weight change (gain). Negative for chills and fever.   HENT: Negative for congestion, ear pain, hearing loss, rhinorrhea, sinus pressure, sore throat and trouble swallowing.    Eyes: Negative for discharge and itching.   Respiratory: Negative for cough, chest tightness and shortness of breath.    Cardiovascular: Negative for chest pain, palpitations and leg swelling.   Gastrointestinal: Negative for abdominal pain, blood in stool, constipation, diarrhea and vomiting.        2018 colonoscopy   Endocrine: Negative for polydipsia and polyuria.   Genitourinary: Negative for  "difficulty urinating, dysuria, enuresis, frequency, hematuria and urgency.        12/21 mammogram  Menses in 8/20   Musculoskeletal: Positive for arthralgias. Negative for gait problem and joint swelling.   Skin: Negative for rash and wound.   Allergic/Immunologic: Negative for immunocompromised state.   Neurological: Negative for dizziness, syncope, weakness, light-headedness and numbness.   Hematological: Does not bruise/bleed easily.   Psychiatric/Behavioral: Positive for decreased concentration. Negative for behavioral problems and dysphoric mood. The patient is not nervous/anxious.        /76   Pulse 65   Temp 98.4 °F (36.9 °C) (Temporal)   Resp 16   Ht 167.6 cm (66\")   Wt 76.2 kg (168 lb)   LMP 11/13/2021   SpO2 98%   Breastfeeding No   BMI 27.12 kg/m²       Physical Exam  Constitutional:       Appearance: Normal appearance. She is well-developed.   HENT:      Head: Normocephalic and atraumatic.      Right Ear: External ear normal.      Left Ear: External ear normal.      Nose: Nose normal.      Mouth/Throat:      Mouth: Mucous membranes are moist.      Pharynx: Oropharynx is clear.   Eyes:      Extraocular Movements: Extraocular movements intact.      Conjunctiva/sclera: Conjunctivae normal.      Pupils: Pupils are equal, round, and reactive to light.   Cardiovascular:      Rate and Rhythm: Normal rate and regular rhythm.      Heart sounds: Normal heart sounds.   Pulmonary:      Effort: Pulmonary effort is normal.      Breath sounds: Normal breath sounds.   Abdominal:      General: Bowel sounds are normal.      Palpations: Abdomen is soft.   Musculoskeletal:         General: Normal range of motion.      Cervical back: Normal range of motion and neck supple.   Lymphadenopathy:      Cervical: No cervical adenopathy.   Skin:     General: Skin is warm and dry.   Neurological:      General: No focal deficit present.      Mental Status: She is alert and oriented to person, place, and time. "   Psychiatric:         Mood and Affect: Mood normal.         Behavior: Behavior normal.         Thought Content: Thought content normal.         Procedure:      Discussion/Summary:    HTN with mild LVH-stable on ARB/aldactone  Migraine-stable  Dizziness-check labs and carotids  Abnormal wt gain-change to Wellbutrin     5/16 labs noted and dw patient       Current Outpatient Medications:   •  butalbital-acetaminophen-caffeine (Esgic) -40 MG per tablet, Take 1 tablet by mouth Every 4 (Four) Hours As Needed for Headache (Tension headache)., Disp: 12 tablet, Rfl: 0  •  estradiol (Estrace) 0.5 MG tablet, Take 1 tablet by mouth Daily., Disp: 90 tablet, Rfl: 1  •  methocarbamol (ROBAXIN) 750 MG tablet, Take 1 tablet by mouth 3 (Three) Times a Day As Needed for Muscle Spasms., Disp: 20 tablet, Rfl: 0  •  Multiple Vitamins-Minerals (PRESERVISION AREDS 2+MULTI VIT PO), Take 1 tablet by mouth Daily., Disp: , Rfl:   •  olmesartan (Benicar) 40 MG tablet, Take 1 tablet by mouth Every Night., Disp: 90 tablet, Rfl: 3  •  Progesterone (PROMETRIUM) 200 MG capsule, Take 1 capsule by mouth Daily., Disp: 90 capsule, Rfl: 1  •  spironolactone (ALDACTONE) 100 MG tablet, Take 1 tablet by mouth Daily., Disp: 90 tablet, Rfl: 1  •  ubrogepant (ubrogepant) 100 MG tablet, Take one tablet by mouth at onset of headache. Can repeat in 2 hours as needed. Do not exceed 2 tablets in one day., Disp: 3 tablet, Rfl: 1  •  buPROPion XL (Wellbutrin XL) 150 MG 24 hr tablet, Take 1 tablet by mouth Every Morning., Disp: 30 tablet, Rfl: 5        Diagnoses and all orders for this visit:    1. Essential hypertension (Primary)  -     CBC (No Diff)    2. Dizziness  -     Comprehensive Metabolic Panel  -     TSH  -     Vitamin B12  -     Duplex Carotid Ultrasound CAR    3. Reactive depression    Other orders  -     buPROPion XL (Wellbutrin XL) 150 MG 24 hr tablet; Take 1 tablet by mouth Every Morning.  Dispense: 30 tablet; Refill: 5

## 2022-05-17 LAB — VIT B12 BLD-MCNC: 562 PG/ML (ref 211–946)

## 2022-05-19 ENCOUNTER — TREATMENT (OUTPATIENT)
Dept: PHYSICAL THERAPY | Facility: CLINIC | Age: 57
End: 2022-05-19

## 2022-05-19 DIAGNOSIS — M54.2 PAIN, NECK: Primary | ICD-10-CM

## 2022-05-19 PROCEDURE — 97112 NEUROMUSCULAR REEDUCATION: CPT | Performed by: PHYSICAL THERAPIST

## 2022-05-19 NOTE — PROGRESS NOTES
"Physical Therapy Daily Treatment Note      Patient: Karthik Ibrahim   : 1965  Referring practitioner: Jimenez Reina MD  Date of Initial Visit: Type: THERAPY  Noted: 2022  Today's Date: 2022  Patient seen for 21 sessions       Visit Diagnoses:    ICD-10-CM ICD-9-CM   1. Pain, neck  M54.2 723.1       Subjective   Patient reports that she was \"loopy\" for the rest of the day following her previous session.  She followed direction and scheduled with her PCP regarding dizziness/confusion episodes and she is scheduled for carotid US .  her pain level is 0/10 upon arrival.      Objective   Confusion episodes x 2, worse after R cervical rotation.    See Exercise, Manual, and Modality Logs for complete treatment.       Assessment & Plan     Assessment    Assessment details: Patient symptoms are evoked more with right cervical rotation than right eye movements or turning circles.  Consistently, though, her symptoms are worse with right sided tasks.  At this point, the confusion is concerning and will hold formal PT until after carotid US.  Will determine appropriateness following procedure.           Timed:         Manual Therapy:    0     mins  84463;     Therapeutic Exercise:    0     mins  16640;     Neuromuscular Santi:    41    mins  48641;    Therapeutic Activity:     0     mins  51519;     Gait Trainin     mins  00466;     Ultrasound:     0     mins  35734;    Ionto                               0    mins   96354  Self Care                       0     mins   93250  Canalith Repos    0     mins 92035      Un-Timed:  Electrical Stimulation:    0     mins  93279 ( );  Dry Needling     0     mins self-pay  Traction     0     mins 11617      Timed Treatment:   41   mins   Total Treatment:     41   mins    Wallace Dejesus, PT  [place] License: [id number]      "

## 2022-05-23 ENCOUNTER — TELEPHONE (OUTPATIENT)
Dept: PHYSICAL THERAPY | Facility: CLINIC | Age: 57
End: 2022-05-23

## 2022-05-23 NOTE — TELEPHONE ENCOUNTER
PLEASE GIVE PATIENT A CALL IF [facility] HAS ANYTHING OPEN UP AFTER THIS Thursday 05/26 - SHE GOT HER TEST MOVED UP AND WAS TRYING TO GET IN SOONER BUT [facility] DIDN'T HAVE ANYTHING UNTIL 06/02 - THANK YOU

## 2022-05-26 ENCOUNTER — HOSPITAL ENCOUNTER (OUTPATIENT)
Dept: CARDIOLOGY | Facility: HOSPITAL | Age: 57
Discharge: HOME | End: 2022-05-26
Admitting: INTERNAL MEDICINE

## 2022-05-26 VITALS — WEIGHT: 167.99 LBS | BODY MASS INDEX: 27 KG/M2 | HEIGHT: 66 IN

## 2022-05-26 LAB
BH CV XLRA MEAS LEFT DIST CCA EDV: 24 CM/SEC
BH CV XLRA MEAS LEFT DIST CCA PSV: 61.6 CM/SEC
BH CV XLRA MEAS LEFT DIST ICA EDV: 40.5 CM/SEC
BH CV XLRA MEAS LEFT DIST ICA PSV: 76.2 CM/SEC
BH CV XLRA MEAS LEFT ICA/CCA RATIO: 0.88
BH CV XLRA MEAS LEFT MID CCA EDV: 26.4 CM/SEC
BH CV XLRA MEAS LEFT MID CCA PSV: 78.6 CM/SEC
BH CV XLRA MEAS LEFT MID ICA EDV: 37.5 CM/SEC
BH CV XLRA MEAS LEFT MID ICA PSV: 69.2 CM/SEC
BH CV XLRA MEAS LEFT PROX CCA EDV: 28.7 CM/SEC
BH CV XLRA MEAS LEFT PROX CCA PSV: 86.8 CM/SEC
BH CV XLRA MEAS LEFT PROX ECA EDV: 18.2 CM/SEC
BH CV XLRA MEAS LEFT PROX ECA PSV: 52.8 CM/SEC
BH CV XLRA MEAS LEFT PROX ICA EDV: 27.1 CM/SEC
BH CV XLRA MEAS LEFT PROX ICA PSV: 58.5 CM/SEC
BH CV XLRA MEAS LEFT PROX SCLA PSV: 95 CM/SEC
BH CV XLRA MEAS LEFT VERTEBRAL A EDV: 16.5 CM/SEC
BH CV XLRA MEAS LEFT VERTEBRAL A PSV: 39.3 CM/SEC
BH CV XLRA MEAS RIGHT DIST CCA EDV: 27.6 CM/SEC
BH CV XLRA MEAS RIGHT DIST CCA PSV: 61 CM/SEC
BH CV XLRA MEAS RIGHT DIST ICA EDV: 43.4 CM/SEC
BH CV XLRA MEAS RIGHT DIST ICA PSV: 89.1 CM/SEC
BH CV XLRA MEAS RIGHT ICA/CCA RATIO: 1.12
BH CV XLRA MEAS RIGHT MID CCA EDV: 22.3 CM/SEC
BH CV XLRA MEAS RIGHT MID CCA PSV: 73.3 CM/SEC
BH CV XLRA MEAS RIGHT MID ICA EDV: 45.1 CM/SEC
BH CV XLRA MEAS RIGHT MID ICA PSV: 82.1 CM/SEC
BH CV XLRA MEAS RIGHT PROX CCA EDV: 24 CM/SEC
BH CV XLRA MEAS RIGHT PROX CCA PSV: 78 CM/SEC
BH CV XLRA MEAS RIGHT PROX ECA EDV: 17.6 CM/SEC
BH CV XLRA MEAS RIGHT PROX ECA PSV: 68.6 CM/SEC
BH CV XLRA MEAS RIGHT PROX ICA EDV: 39.3 CM/SEC
BH CV XLRA MEAS RIGHT PROX ICA PSV: 67.4 CM/SEC
BH CV XLRA MEAS RIGHT PROX SCLA PSV: 90.3 CM/SEC
BH CV XLRA MEAS RIGHT VERTEBRAL A EDV: 16.9 CM/SEC
BH CV XLRA MEAS RIGHT VERTEBRAL A PSV: 38.9 CM/SEC
LEFT ARM BP: NORMAL MMHG
MAXIMAL PREDICTED HEART RATE: 166 BPM
RIGHT ARM BP: NORMAL MMHG
STRESS TARGET HR: 141 BPM

## 2022-05-26 PROCEDURE — 93880 EXTRACRANIAL BILAT STUDY: CPT

## 2022-05-26 PROCEDURE — 93880 EXTRACRANIAL BILAT STUDY: CPT | Performed by: INTERNAL MEDICINE

## 2022-06-02 ENCOUNTER — APPOINTMENT (OUTPATIENT)
Dept: CARDIOLOGY | Facility: HOSPITAL | Age: 57
End: 2022-06-02

## 2022-06-14 ENCOUNTER — TREATMENT (OUTPATIENT)
Dept: PHYSICAL THERAPY | Facility: CLINIC | Age: 57
End: 2022-06-14

## 2022-06-14 DIAGNOSIS — M54.2 PAIN, NECK: Primary | ICD-10-CM

## 2022-06-14 PROCEDURE — 97530 THERAPEUTIC ACTIVITIES: CPT | Performed by: PHYSICAL THERAPIST

## 2022-06-14 PROCEDURE — 97112 NEUROMUSCULAR REEDUCATION: CPT | Performed by: PHYSICAL THERAPIST

## 2022-06-14 PROCEDURE — 97110 THERAPEUTIC EXERCISES: CPT | Performed by: PHYSICAL THERAPIST

## 2022-06-14 NOTE — PROGRESS NOTES
"Physical Therapy Re Certification Of Plan of Care  Patient: Karthik Ibrahim   : 1965  Diagnosis/ICD-10 Code:  Pain, neck [M54.2]  Referring practitioner: Jimenez Reina MD  Date of Initial Visit: 2022  Today's Date: 2022  Patient seen for 22 sessions         Visit Diagnoses:    ICD-10-CM ICD-9-CM   1. Pain, neck  M54.2 723.1             Clinical Progress: improved  Home Program Compliance: Yes  Treatment has included: therapeutic exercise, neuromuscular re-education, manual therapy and therapeutic activity      Subjective   Karthik Ibrahim reports that she had artery US and it looked normal.  She was on vacation last week, didn't notice a lot of symptoms.  She is still having episodic word-recall issues, but she seems to only feel worse when she is \"challenged\".  She also had an eye doctor appt and got new lenses for her glasses that are supposed to be better for eye-related fatigue.  She also notes a lot of neck tension when she tries to perform recreational exercises, like the rower.     Objective          Palpation   Left   Hypertonic in the sternocleidomastoid.     Right   Hypertonic in the sternocleidomastoid and upper trapezius.     Active Range of Motion   Cervical/Thoracic Spine   Cervical    Flexion: 40 degrees   Extension: 40 degrees   Left lateral flexion: 32 degrees   Right lateral flexion: 30 degrees   Left rotation: 65 degrees   Right rotation: 60 degrees     Tests   Cervical   Deep neck flexor endurance: 10 seconds     General Comments     Cervical/Thoracic Comments  Vestibular:  Good smooth pursuit  No head shaking nystagmus  Mild symptoms with VOR1             Assessment & Plan     Assessment  Impairments: abnormal coordination, abnormal muscle firing, abnormal or restricted ROM, lacks appropriate home exercise program and pain with function  Functional Limitations: sleeping, uncomfortable because of pain and sitting  Assessment details: Patient presents with signs and symptoms " "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 Dizziness seems cervicogenic in nature.     3/14- Patient was initially limited by poor tolerance to cervical movement and cognitive processing issues secondary to post-concussive syndrome. She was assessed by a vestibular-trained PT and habituation/processing activities were initiated as she became dizzy, struggled with vision, and reported nausea/headache with these tests. She has demonstrated improvements in visual processing and ability to perform more complex tasks with slower onset of symptoms. Patient's PCP has placed her on work leave for 7 days to allow cognitive rest.    4/7- Patient has received 13 PT visits. Cervical AROM is much less sensitive/painful but she continues to demonstrate significant muscle guarding with intrinsic weakness. The main focus of recent PT has been cognitive and kinesthetic processing, as she struggled with multi-tasking, coordination, and balance while reporting intermittent dizziness and headache once she reaches her fatigue point.     4/28- Patient has received 17 PT visits. Continued cervical intrinsic weakness due to onset of tension headache noted with testing. She continues to poorly tolerate direct cervical spine interventions due to severe reflexive guarding, so it was decided to abandon these and continue focus on cognitive processing and vestibular retraining, which she has responded well with.     6/14: Patient continues to struggle with intermittent reports of mental \"fogginess\" and vague \"dizzy\" symptoms.  Her dizzy symptoms are improving and she has improved performance with vestibular-related activities.  She still seems to worsen as she notices symptoms, likely anxiety driven.  She is now off work for the summer and has had her visual symptoms addressed via the optometrist.  She should experience " further spontaneous improvement from what are likely post-concussion related symptoms.  Will follow up in ten days.  Anticipate 1x/wk follow ups another month or so to ensure continuous improvement and progress activities driven towards recreational exercise, cervical irritability, and concussion related dizzy symptoms.     Barriers to therapy: acuity  Prognosis: good    Goals  Plan Goals: Short Term Goals 1 weeks  1. Patient to be compliant with initial HEP. MET  2. Patient to demonstrate pain free cervical AROM without shakiness. MET    Long Term Goals 3 weeks  1. Patient to be independent with HEP. 75% MET  2. Patient to demonstrate full pain free cervical AROM. MET  3. Patient to return to work without increased symptoms. NOT MET  4. Patient to report resolution of dizziness. 25% MET  5. Patient to improve NDI score to < 25%. 90% MET      Plan  Therapy options: will be seen for skilled therapy services  Planned modality interventions: cryotherapy, thermotherapy (hydrocollator packs) and TENS  Planned therapy interventions: motor coordination training, neuromuscular re-education, postural training, home exercise program, functional ROM exercises, balance/weight-bearing training, therapeutic activities, IADL retraining and ADL retraining  Frequency: 1x week  Duration in weeks: 4  Treatment plan discussed with: patient  Plan details: 1x/week for 4 weeks             Recommendations: Continue as planned  Timeframe: 1 month  Prognosis to achieve goals: fair      Timed:         Manual Therapy:    0     mins  55758;     Therapeutic Exercise:    10     mins  36624;     Neuromuscular Santi:    26    mins  87141;    Therapeutic Activity:     14     mins  60354;     Gait Trainin     mins  78022;     Ultrasound:     0     mins  43534;    Ionto                               0    mins   84996  Self Care                       0     mins   45468  Canalith Repos    0     mins 89856      Un-Timed:  Electrical Stimulation:     0     mins  40740 ( );  Dry Needling     0     mins self-pay  Traction     0     mins 73736  Re-Eval                           0    mins  58461      Timed Treatment:   50   mins   Total Treatment:     50   mins          PT: Wallace Dejesus PT     [place] License:  [id number]    Electronically signed by Wallace Dejesus, PT, 06/14/22, 10:17 AM EDT    Certification Period: 6/14/2022 thru 9/11/2022  I certify that the therapy services are furnished while this patient is under my care.  The services outlined above are required by this patient, and will be reviewed every 90 days.         Physician Signature:__________________________________________________    PHYSICIAN: Jimenez Reina MD   NPI: [id number]     DATE:     Please sign and return via fax to [email or web address] . Thank you, [facility]

## 2022-06-23 ENCOUNTER — TREATMENT (OUTPATIENT)
Dept: PHYSICAL THERAPY | Facility: CLINIC | Age: 57
End: 2022-06-23

## 2022-06-23 DIAGNOSIS — M54.2 PAIN, NECK: Primary | ICD-10-CM

## 2022-06-23 PROCEDURE — 97140 MANUAL THERAPY 1/> REGIONS: CPT | Performed by: PHYSICAL THERAPIST

## 2022-06-23 PROCEDURE — 97110 THERAPEUTIC EXERCISES: CPT | Performed by: PHYSICAL THERAPIST

## 2022-06-23 NOTE — PROGRESS NOTES
Physical Therapy Daily Treatment Note      Patient: Karthik Ibrahim   : 1965  Referring practitioner: Jimenez Reina MD  Date of Initial Visit: Type: THERAPY  Noted: 2022  Today's Date: 2022  Patient seen for 23 sessions       Visit Diagnoses:    ICD-10-CM ICD-9-CM   1. Pain, neck  M54.2 723.1       Subjective   Patient reports further improvement in her complaints of dizziness but notes less structured activity.  She has continued headaches and tension.  She still gets jaw pain when she feels tense.  her pain level is 3/10 upon arrival.      Objective   General guarding along right cervical mid-upper facets.    TTP R>L upper trap with guarding.    See Exercise, Manual, and Modality Logs for complete treatment.       Assessment & Plan     Assessment    Assessment details: Patient was able to tolerate more cervical-oriented interventions today than she has been before.  Her dizzy symptoms seem to be better and less of a barrier.  She reports improved pain post treatment.  Will plan to initiate more cervical interventions again, now that her likely concussive symptoms are improving.          Timed:         Manual Therapy:    11     mins  69119;     Therapeutic Exercise:    39     mins  46862;     Neuromuscular Santi:    0    mins  17438;    Therapeutic Activity:     0     mins  45320;     Gait Trainin     mins  10107;     Ultrasound:     0     mins  70608;    Ionto                               0    mins   97372  Self Care                       0     mins   18223  Canalith Repos    0     mins 01117      Un-Timed:  Electrical Stimulation:    0     mins  34229 ( );  Dry Needling     0     mins self-pay  Traction     0     mins 65075      Timed Treatment:   50   mins   Total Treatment:     50   mins    Wallace Dejesus, PT  [place] License: [id number]

## 2022-06-24 ENCOUNTER — TRANSCRIBE ORDERS (OUTPATIENT)
Dept: PHYSICAL THERAPY | Facility: CLINIC | Age: 57
End: 2022-06-24

## 2022-06-29 ENCOUNTER — TREATMENT (OUTPATIENT)
Dept: PHYSICAL THERAPY | Facility: CLINIC | Age: 57
End: 2022-06-29

## 2022-06-29 DIAGNOSIS — M54.2 PAIN, NECK: Primary | ICD-10-CM

## 2022-06-29 PROCEDURE — 97140 MANUAL THERAPY 1/> REGIONS: CPT | Performed by: PHYSICAL THERAPIST

## 2022-06-29 PROCEDURE — 97110 THERAPEUTIC EXERCISES: CPT | Performed by: PHYSICAL THERAPIST

## 2022-06-29 NOTE — PROGRESS NOTES
"Physical Therapy Daily Treatment Note      Patient: Karthik Ibrahim   : 1965  Referring practitioner: Jimenez Reina MD  Date of Initial Visit: Type: THERAPY  Noted: 2022  Today's Date: 2022  Patient seen for 24 sessions       Visit Diagnoses:    ICD-10-CM ICD-9-CM   1. Pain, neck  M54.2 723.1       Subjective   Patient reports that she has had \"pressure\" sensations along the anterior neck and mandibular region.  She hasn't had a lot of the dizzy symptoms.  The pressure symptoms come on with general activity in sitting/standing, better with laying down.  She finds some relief with cat/camel and thoracic ext.  her pain level is 2-3/10 upon arrival.      Objective   Guarding along SCM with mild tenderness bilaterally.    See Exercise, Manual, and Modality Logs for complete treatment.       Assessment & Plan     Assessment    Assessment details: Patient tolerates neck exercises when she alternates between extension and mobility.  She has most tension with CT with headlift, as expected.  Her vestibular symptoms are improved and her complaints are more pain/pressure than dizzy related with neck interventions.      Plan  Plan details: Progress cervical stabilization.          Timed:         Manual Therapy:    10     mins  94362;     Therapeutic Exercise:    39     mins  53779;     Neuromuscular Santi:    0    mins  83709;    Therapeutic Activity:     0     mins  37115;     Gait Trainin     mins  38691;     Ultrasound:     0     mins  95875;    Ionto                               0    mins   37022  Self Care                       0     mins   59335  Canalith Repos    0     mins 56834      Un-Timed:  Electrical Stimulation:    0     mins  77821 ( );  Dry Needling     0     mins self-pay  Traction     0     mins 35681      Timed Treatment:   49   mins   Total Treatment:     49   mins    Wallace Dejesus, PT  [place] License: [id number]      "

## 2022-07-05 ENCOUNTER — TREATMENT (OUTPATIENT)
Dept: PHYSICAL THERAPY | Facility: CLINIC | Age: 57
End: 2022-07-05

## 2022-07-05 DIAGNOSIS — M54.2 PAIN, NECK: Primary | ICD-10-CM

## 2022-07-05 PROCEDURE — 97140 MANUAL THERAPY 1/> REGIONS: CPT | Performed by: PHYSICAL THERAPIST

## 2022-07-05 PROCEDURE — 97110 THERAPEUTIC EXERCISES: CPT | Performed by: PHYSICAL THERAPIST

## 2022-07-05 NOTE — PROGRESS NOTES
Physical Therapy Daily Treatment Note      Patient: Karthik Ibrahim   : 1965  Referring practitioner: Jimenez Reina MD  Date of Initial Visit: Type: THERAPY  Noted: 2022  Today's Date: 2022  Patient seen for 25 sessions       Visit Diagnoses:    ICD-10-CM ICD-9-CM   1. Pain, neck  M54.2 723.1       Subjective   Patient reports she continues to feel as if her head is heavy.  She ran a 10k over the weekend, had severe neck pain for short duration afterwards.  She continues to have episodes of tension and feeling of her head being heavy.  Most of her dizzyt symptoms are resolved.  her pain level is 3/10 upon arrival.      Objective   Tender along upper thoracic region.    See Exercise, Manual, and Modality Logs for complete treatment.       Assessment & Plan     Assessment    Assessment details: Karthik continues to have increased symptoms with scapular retraction exercises.  Her dizzy symptoms have mostly resolved.  Her symptoms are of short duration overall.  She finds releif with certain positions in the clinic and encouraged them as rescue activities.     Plan  Plan details: Reassessment.           Timed:         Manual Therapy:    10     mins  14138;     Therapeutic Exercise:    40     mins  44185;     Neuromuscular Santi:    0    mins  59540;    Therapeutic Activity:     0     mins  30375;     Gait Trainin     mins  90253;     Ultrasound:     0     mins  05026;    Ionto                               0    mins   80713  Self Care                       0     mins   18790  Canalith Repos    0     mins 29846      Un-Timed:  Electrical Stimulation:    0     mins  89834 ( );  Dry Needling     0     mins self-pay  Traction     0     mins 02718      Timed Treatment:   50   mins   Total Treatment:     50   mins    Wallace Dejesus, PT  [place] License: [id number]

## 2022-07-13 ENCOUNTER — TREATMENT (OUTPATIENT)
Dept: PHYSICAL THERAPY | Facility: CLINIC | Age: 57
End: 2022-07-13

## 2022-07-13 DIAGNOSIS — M54.2 PAIN, NECK: Primary | ICD-10-CM

## 2022-07-13 PROCEDURE — 97110 THERAPEUTIC EXERCISES: CPT | Performed by: PHYSICAL THERAPIST

## 2022-07-13 PROCEDURE — 97140 MANUAL THERAPY 1/> REGIONS: CPT | Performed by: PHYSICAL THERAPIST

## 2022-07-13 PROCEDURE — 97530 THERAPEUTIC ACTIVITIES: CPT | Performed by: PHYSICAL THERAPIST

## 2022-07-13 NOTE — PROGRESS NOTES
Physical Therapy Re Certification Of Plan of Care  Patient: Karthik Ibrahim   : 1965  Diagnosis/ICD-10 Code:  Pain, neck [M54.2]  Referring practitioner: Jimenez Reina MD  Date of Initial Visit: 2022  Today's Date: 2022  Patient seen for 26 sessions         Visit Diagnoses:    ICD-10-CM ICD-9-CM   1. Pain, neck  M54.2 723.1           Subjective Questionnaire: NDI:20%  Clinical Progress: improved  Home Program Compliance: Yes  Treatment has included: therapeutic exercise, neuromuscular re-education, manual therapy and therapeutic activity      Subjective   Karthik Ibrahim reports that her dizzy symptoms are improved.  She is working on her exercises but she still feels pain in her neck and episodic pressure in her head.  She notes that the pain starts around the CT junction.  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  Pain rating 2/10.     Objective          Palpation   Left   Tenderness of the sternocleidomastoid.     Right   Hypertonic in the upper trapezius. Tenderness of the sternocleidomastoid.     Active Range of Motion   Cervical/Thoracic Spine   Cervical    Flexion: 40 degrees   Extension: 40 degrees   Left lateral flexion: 32 degrees   Right lateral flexion: 25 degrees   Left rotation: 65 degrees   Right rotation: 60 degrees     Tests   Cervical   Deep neck flexor endurance: 10 seconds     General Comments     Cervical/Thoracic Comments  Vestibular:  Good smooth pursuit  No head shaking nystagmus  Mild symptoms with VOR1             Assessment & Plan     Assessment  Impairments: abnormal coordination, abnormal muscle firing, abnormal or restricted ROM and pain with function  Functional Limitations: lifting, uncomfortable because of pain and sitting  Assessment details: Patient presents with signs and symptoms 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 Dizziness seems cervicogenic in nature.     3/14- Patient was initially limited by poor tolerance to cervical movement and cognitive processing issues secondary to post-concussive syndrome. She was assessed by a vestibular-trained PT and habituation/processing activities were initiated as she became dizzy, struggled with vision, and reported nausea/headache with these tests. She has demonstrated improvements in visual processing and ability to perform more complex tasks with slower onset of symptoms. Patient's PCP has placed her on work leave for 7 days to allow cognitive rest.    4/7- Patient has received 13 PT visits. Cervical AROM is much less sensitive/painful but she continues to demonstrate significant muscle guarding with intrinsic weakness. The main focus of recent PT has been cognitive and kinesthetic processing, as she struggled with multi-tasking, coordination, and balance while reporting intermittent dizziness and headache once she reaches her fatigue point.     4/28- Patient has received 17 PT visits. Continued cervical intrinsic weakness due to onset of tension headache noted with testing. She continues to poorly tolerate direct cervical spine interventions due to severe reflexive guarding, so it was decided to abandon these and continue focus on cognitive processing and vestibular retraining, which she has responded well with.     6/14: Patient continues to struggle with intermittent reports of mental \"fogginess\" and vague \"dizzy\" symptoms.  Her dizzy symptoms are improving and she has improved performance with vestibular-related activities.  She still seems to worsen as she notices symptoms, likely anxiety driven.  She is now off work for the summer and has had her visual symptoms addressed via the optometrist.  She should experience further spontaneous improvement from what are likely post-concussion related symptoms.  Will follow up in ten days.  Anticipate 1x/wk follow ups another month or so " to ensure continuous improvement and progress activities driven towards recreational exercise, cervical irritability, and concussion related dizzy symptoms.    7/13- Patient has attended extensive PT.  We focused on the vestibular system for a few months and those symptoms are better.  Over the last month, we have focused on her cervical spine.  She still has pain with prolonged positions secondary to forward head posture.  We focused on cervical retraction in today's session and she reported mild pain relief.  She does, however, continues to report episodic pain.  She relaxes more during manual techniques and has improved tolerance.  We are attempted to finalize and appropriate home program for her.  She is taking a practice drive on Friday, will see how that goes and determine need for additional care.      Barriers to therapy: acuity  Prognosis: good    Goals  Plan Goals: Short Term Goals 1 weeks  1. Patient to be compliant with initial HEP. MET  2. Patient to demonstrate pain free cervical AROM without shakiness. MET    Long Term Goals 3 weeks  1. Patient to be independent with HEP. 75% MET  2. Patient to demonstrate full pain free cervical AROM. MET  3. Patient to return to work without increased symptoms. NOT MET  4. Patient to report resolution of dizziness. 25% MET  5. Patient to improve NDI score to < 25%. 90% MET      Plan  Therapy options: will be seen for skilled therapy services  Planned modality interventions: cryotherapy, thermotherapy (hydrocollator packs) and TENS  Planned therapy interventions: motor coordination training, neuromuscular re-education, postural training, home exercise program, functional ROM exercises, balance/weight-bearing training, therapeutic activities, IADL retraining and ADL retraining  Frequency: 1x week  Duration in weeks: 1  Treatment plan discussed with: patient  Plan details: One visit in two weeks to assess tolerance to return to work.  Could d/c to I HEP at that time.               Recommendations: Continue as planned  Timeframe: 3 weeks  Prognosis to achieve goals: fair      Timed:         Manual Therapy:    12     mins  42279;     Therapeutic Exercise:    25     mins  82168;     Neuromuscular Santi:    0    mins  41401;    Therapeutic Activity:     11     mins  91646;     Gait Trainin     mins  90117;     Ultrasound:     0     mins  67104;    Ionto                               0    mins   96050  Self Care                       0     mins   03196  Canalith Repos    0     mins 23377      Un-Timed:  Electrical Stimulation:    0     mins  81518 (MC );  Dry Needling     0     mins self-pay  Traction     0     mins 93042  Re-Eval                           0    mins  87594      Timed Treatment:   48   mins   Total Treatment:     48   mins          PT: Wallace Dejesus PT     [place] License:  [id number]    Electronically signed by Wallace Dejesus PT, 22, 9:28 AM EDT    Certification Period: 2022 thru 10/10/2022  I certify that the therapy services are furnished while this patient is under my care.  The services outlined above are required by this patient, and will be reviewed every 90 days.         Physician Signature:__________________________________________________    PHYSICIAN: Jimenez Reina MD   NPI: [id number]     DATE:     Please sign and return via fax to [email or web address] . Thank you, [facility]

## 2022-07-26 DIAGNOSIS — I10 ESSENTIAL HYPERTENSION: ICD-10-CM

## 2022-07-26 RX ORDER — OLMESARTAN MEDOXOMIL 40 MG/1
TABLET ORAL
Qty: 90 TABLET | Refills: 3 | Status: SHIPPED | OUTPATIENT
Start: 2022-07-26

## 2022-07-28 ENCOUNTER — TREATMENT (OUTPATIENT)
Dept: PHYSICAL THERAPY | Facility: CLINIC | Age: 57
End: 2022-07-28

## 2022-07-28 DIAGNOSIS — M54.2 PAIN, NECK: Primary | ICD-10-CM

## 2022-07-28 PROCEDURE — 97110 THERAPEUTIC EXERCISES: CPT | Performed by: PHYSICAL THERAPIST

## 2022-07-28 PROCEDURE — 97140 MANUAL THERAPY 1/> REGIONS: CPT | Performed by: PHYSICAL THERAPIST

## 2022-07-28 NOTE — PROGRESS NOTES
"Physical Therapy Daily Treatment Note      Patient: Karthik Ibrahim   : 1965  Referring practitioner: Jimenez Reina MD  Date of Initial Visit: Type: THERAPY  Noted: 2022  Today's Date: 2022  Patient seen for 27 sessions       Visit Diagnoses:    ICD-10-CM ICD-9-CM   1. Pain, neck  M54.2 723.1       Subjective   Patient reports that she continues to feel heavy in the head.  She has had less recall and coginitive symptoms.  She continues to have episodic HAs.  She feels her neck \"tightens\" and it doesn't improve with continued activity.  She was very active prior to initial injury but hasn't returned to recreational exercises.  her pain level is 1-2/10 upon arrival.      Objective   Minimal guarding along the right facets, notes \"sensitive\" sensations with palpation.      See Exercise, Manual, and Modality Logs for complete treatment.       Assessment & Plan     Assessment    Assessment details: At this point, I feel Karthik has exhausted her PT improvement.  She has improved most of her vestibular complaints, but she continues to have neck pain and tension that is worse in gravity dependent positions.  We discussed the possibility of pain management referral to assist with activity tolerance.  Advised her to start more of a recreational exercise program but stop with onset of HAs.  At this point, she should return to PCP or referring provider for additional referrals/management of her continued symptoms.           Timed:         Manual Therapy:    10     mins  98789;     Therapeutic Exercise:    39     mins  19206;     Neuromuscular Santi:    0    mins  14284;    Therapeutic Activity:     0     mins  13847;     Gait Trainin     mins  18731;     Ultrasound:     0     mins  34189;    Ionto                               0    mins   97324  Self Care                       0     mins   09370  Canalith Repos    0     mins 24502      Un-Timed:  Electrical Stimulation:    0     mins  34555 (MC " );  Dry Needling     0     mins self-pay  Traction     0     mins 96876      Timed Treatment:   49   mins   Total Treatment:     49   mins    Wallace Dejesus, PT  [place] License: [id number]

## 2022-09-01 ENCOUNTER — OFFICE VISIT (OUTPATIENT)
Dept: OBSTETRICS AND GYNECOLOGY | Facility: CLINIC | Age: 57
End: 2022-09-01

## 2022-09-01 VITALS
RESPIRATION RATE: 16 BRPM | WEIGHT: 160 LBS | SYSTOLIC BLOOD PRESSURE: 116 MMHG | BODY MASS INDEX: 25.84 KG/M2 | DIASTOLIC BLOOD PRESSURE: 70 MMHG

## 2022-09-01 DIAGNOSIS — Z01.419 ENCOUNTER FOR GYNECOLOGICAL EXAMINATION WITHOUT ABNORMAL FINDING: Primary | ICD-10-CM

## 2022-09-01 DIAGNOSIS — R92.8 ABNORMALITY OF RIGHT BREAST ON SCREENING MAMMOGRAM: ICD-10-CM

## 2022-09-01 DIAGNOSIS — Z79.890 POSTMENOPAUSAL HRT (HORMONE REPLACEMENT THERAPY): ICD-10-CM

## 2022-09-01 DIAGNOSIS — Z80.49 FAMILY HISTORY OF UTERINE CANCER: ICD-10-CM

## 2022-09-01 PROCEDURE — 99396 PREV VISIT EST AGE 40-64: CPT | Performed by: NURSE PRACTITIONER

## 2022-09-01 RX ORDER — PROGESTERONE 200 MG/1
200 CAPSULE ORAL DAILY
Qty: 90 CAPSULE | Refills: 3 | Status: SHIPPED | OUTPATIENT
Start: 2022-09-01

## 2022-09-01 RX ORDER — ESTRADIOL 0.5 MG/1
0.5 TABLET ORAL DAILY
Qty: 90 TABLET | Refills: 3 | Status: SHIPPED | OUTPATIENT
Start: 2022-09-01

## 2022-09-01 NOTE — PROGRESS NOTES
Annual Visit     Patient Name: Karthik Ibrahim  : 1965   MRN: [id number]   Care Team: Patient Care Team:  Abram Adams MD as PCP - General (Internal Medicine)  Mary Alice Ruelas APRN as Nurse Practitioner (Nurse Practitioner)    Chief Complaint:    Chief Complaint   Patient presents with   • Annual Exam       HPI: Karthik Ibrahim is a 56 y.o. year old  presenting to be seen for her gynecologic exam.   Postmenopausal - on HRT   PMB several months ago - pelvic u/s and SIS done   SIS in 2022 EMS 1.6mm   Hasn't had any bldg since   Estrace 0.5mg qd  Prometrium 200mg qd  Menopausal sx well controlled with medication   Asking how long she should cont taking HRT     Former smoker   Hx HTN - well controlled with medication      Pap smear  WNL HPV negative      Mammogram 2021 birads 0   Right breast u/s done birads 3 - 6 month f/u recommended - she hasn't had that done yet      DEXA  WNL      Colonoscopy  WNL - rpt 10 yrs   Family hx of colon cancer      MGM with hx of uterine cancer   States she did do the ovarian screening at  in 2022     C/o irregular bowel mvmt - alternating between constipation and diarrhea   No dark/tarry or bloody stools   Cologuard 2021 negative       Subjective      /70   Resp 16   Wt 72.6 kg (160 lb)   LMP 2021   Breastfeeding No   BMI 25.84 kg/m²     BMI reviewed: Body mass index is 25.84 kg/m².      Objective     Physical Exam    Neuro: alert and oriented to person, place and time   General:  alert; cooperative; well developed; well nourished   Skin:  No suspicious lesions seen   Thyroid: normal to inspection and palpation   Lungs:  breathing is unlabored  clear to auscultation bilaterally   Heart:  regular rate and rhythm, S1, S2 normal, no murmur, click, rub or gallop  normal apical impulse   Breasts:  Examined in supine position  Symmetric without masses or skin dimpling  Nipples normal without inversion, lesions or  discharge  There are no palpable axillary nodes   Abdomen: soft, non-tender; no masses  no umbilical or inguinal hernias are present  no hepato-splenomegaly   Pelvis: Clinical staff was present for exam  External genitalia:  normal appearance of the external genitalia including Bartholin's and [place]'s glands.  :  urethral meatus normal;  Vaginal:  normal pink mucosa without prolapse or lesions.  Cervix:  normal appearance.  Uterus:  normal size, shape and consistency.  Adnexa:  normal bimanual exam of the adnexa.  Rectal:  digital rectal exam not performed; anus visually normal appearing.         Assessment / Plan      Assessment  Problems Addressed This Visit    ICD-10-CM ICD-9-CM   1. Encounter for gynecological examination without abnormal finding  Z01.419 V72.31   2. Postmenopausal HRT (hormone replacement therapy)  Z79.890 V07.4   3. Abnormality of right breast on screening mammogram  R92.8 793.80   4. Family history of uterine cancer  Z80.49 V16.49       Plan    Pap smear not indicated   Discussed monthly SBEs and importance of annual and f/u imaging   Right breast u/s ordered   Mammogram due 12/2022     Discussed indications for HRT and risks    Will cont with current regimen   Estrace 0.5mg and Prometrium 200mg qd   She will call with any vaginal bldg     Discussed Calcium, 600 mg/ Vit. D, 400 IU daily; regular weight-bearing exercise for bone health     Recommend trial of probiotics like Align   If GI sx do not resolve, she will f/u with PCP     Recommend cont annual ovarian screening at      AV 1 yr         40 to 64: Counseling/Anticipatory Guidance Discussed: injury prevention, screenings and self-breast exam    Follow Up  Return in about 1 year (around 9/1/2023) for Annual physical.  Patient was given instructions and counseling regarding her condition or for health maintenance advice. Please see specific information pulled into the AVS if appropriate.     Mary Alice Ruelas, [facility]  September 1,  2022  10:42 EDT

## 2022-09-22 ENCOUNTER — TRANSCRIBE ORDERS (OUTPATIENT)
Dept: OBSTETRICS AND GYNECOLOGY | Facility: CLINIC | Age: 57
End: 2022-09-22

## 2022-09-22 ENCOUNTER — HOSPITAL ENCOUNTER (OUTPATIENT)
Dept: MAMMOGRAPHY | Facility: HOSPITAL | Age: 57
Discharge: HOME | End: 2022-09-22

## 2022-09-22 ENCOUNTER — HOSPITAL ENCOUNTER (OUTPATIENT)
Dept: ULTRASOUND IMAGING | Facility: HOSPITAL | Age: 57
Discharge: HOME | End: 2022-09-22

## 2022-09-22 DIAGNOSIS — R92.8 ABNORMALITY OF RIGHT BREAST ON SCREENING MAMMOGRAM: ICD-10-CM

## 2022-09-22 DIAGNOSIS — Z12.31 ENCOUNTER FOR SCREENING MAMMOGRAM FOR BREAST CANCER: Primary | ICD-10-CM

## 2022-09-22 PROCEDURE — G0279 TOMOSYNTHESIS, MAMMO: HCPCS

## 2022-09-22 PROCEDURE — 77065 DX MAMMO INCL CAD UNI: CPT

## 2022-09-22 PROCEDURE — 77065 DX MAMMO INCL CAD UNI: CPT | Performed by: RADIOLOGY

## 2022-09-22 PROCEDURE — 77061 BREAST TOMOSYNTHESIS UNI: CPT | Performed by: RADIOLOGY

## 2022-11-14 RX ORDER — BUPROPION HYDROCHLORIDE 150 MG/1
TABLET ORAL
Qty: 30 TABLET | Refills: 5 | Status: SHIPPED | OUTPATIENT
Start: 2022-11-14

## 2022-12-09 ENCOUNTER — HOSPITAL ENCOUNTER (OUTPATIENT)
Dept: MAMMOGRAPHY | Facility: HOSPITAL | Age: 57
Discharge: HOME | End: 2022-12-09
Admitting: NURSE PRACTITIONER

## 2022-12-09 DIAGNOSIS — Z12.31 ENCOUNTER FOR SCREENING MAMMOGRAM FOR BREAST CANCER: ICD-10-CM

## 2022-12-09 PROCEDURE — 77063 BREAST TOMOSYNTHESIS BI: CPT

## 2022-12-09 PROCEDURE — 77067 SCR MAMMO BI INCL CAD: CPT

## 2022-12-09 PROCEDURE — 77063 BREAST TOMOSYNTHESIS BI: CPT | Performed by: RADIOLOGY

## 2022-12-09 PROCEDURE — 77067 SCR MAMMO BI INCL CAD: CPT | Performed by: RADIOLOGY

## 2023-01-31 ENCOUNTER — OFFICE VISIT (OUTPATIENT)
Dept: INTERNAL MEDICINE | Facility: CLINIC | Age: 58
End: 2023-01-31
Payer: COMMERCIAL

## 2023-01-31 ENCOUNTER — LAB (OUTPATIENT)
Dept: LAB | Facility: HOSPITAL | Age: 58
End: 2023-01-31
Payer: COMMERCIAL

## 2023-01-31 ENCOUNTER — HOSPITAL ENCOUNTER (OUTPATIENT)
Dept: GENERAL RADIOLOGY | Facility: HOSPITAL | Age: 58
Discharge: HOME | End: 2023-01-31
Admitting: INTERNAL MEDICINE
Payer: COMMERCIAL

## 2023-01-31 VITALS
DIASTOLIC BLOOD PRESSURE: 74 MMHG | WEIGHT: 160 LBS | HEART RATE: 74 BPM | BODY MASS INDEX: 25.71 KG/M2 | SYSTOLIC BLOOD PRESSURE: 130 MMHG | OXYGEN SATURATION: 99 % | HEIGHT: 66 IN

## 2023-01-31 DIAGNOSIS — M54.2 NECK PAIN: ICD-10-CM

## 2023-01-31 DIAGNOSIS — F32.9 REACTIVE DEPRESSION: ICD-10-CM

## 2023-01-31 DIAGNOSIS — I10 ESSENTIAL HYPERTENSION: Primary | ICD-10-CM

## 2023-01-31 DIAGNOSIS — M25.50 MULTIPLE JOINT PAIN: ICD-10-CM

## 2023-01-31 LAB
ALBUMIN SERPL-MCNC: 4.7 G/DL (ref 3.5–5.2)
ALBUMIN/GLOB SERPL: 2.2 G/DL
ALP SERPL-CCNC: 78 U/L (ref 39–117)
ALT SERPL W P-5'-P-CCNC: 14 U/L (ref 1–33)
ANION GAP SERPL CALCULATED.3IONS-SCNC: 7.7 MMOL/L (ref 5–15)
AST SERPL-CCNC: 22 U/L (ref 1–32)
BILIRUB SERPL-MCNC: 0.3 MG/DL (ref 0–1.2)
BUN SERPL-MCNC: 13 MG/DL (ref 6–20)
BUN/CREAT SERPL: 18.8 (ref 7–25)
CALCIUM SPEC-SCNC: 9.6 MG/DL (ref 8.6–10.5)
CHLORIDE SERPL-SCNC: 104 MMOL/L (ref 98–107)
CHROMATIN AB SERPL-ACNC: <10 IU/ML (ref 0–14)
CO2 SERPL-SCNC: 29.3 MMOL/L (ref 22–29)
CREAT SERPL-MCNC: 0.69 MG/DL (ref 0.57–1)
CRP SERPL-MCNC: <0.3 MG/DL (ref 0–0.5)
EGFRCR SERPLBLD CKD-EPI 2021: 102.6 ML/MIN/1.73
GLOBULIN UR ELPH-MCNC: 2.1 GM/DL
GLUCOSE SERPL-MCNC: 102 MG/DL (ref 65–99)
POTASSIUM SERPL-SCNC: 4.3 MMOL/L (ref 3.5–5.2)
PROT SERPL-MCNC: 6.8 G/DL (ref 6–8.5)
SODIUM SERPL-SCNC: 141 MMOL/L (ref 136–145)
TSH SERPL DL<=0.05 MIU/L-ACNC: 1.42 UIU/ML (ref 0.27–4.2)
URATE SERPL-MCNC: 3.6 MG/DL (ref 2.4–5.7)

## 2023-01-31 PROCEDURE — 84550 ASSAY OF BLOOD/URIC ACID: CPT | Performed by: INTERNAL MEDICINE

## 2023-01-31 PROCEDURE — 86038 ANTINUCLEAR ANTIBODIES: CPT | Performed by: INTERNAL MEDICINE

## 2023-01-31 PROCEDURE — 80050 GENERAL HEALTH PANEL: CPT | Performed by: INTERNAL MEDICINE

## 2023-01-31 PROCEDURE — 72040 X-RAY EXAM NECK SPINE 2-3 VW: CPT

## 2023-01-31 PROCEDURE — 86200 CCP ANTIBODY: CPT | Performed by: INTERNAL MEDICINE

## 2023-01-31 PROCEDURE — 86431 RHEUMATOID FACTOR QUANT: CPT | Performed by: INTERNAL MEDICINE

## 2023-01-31 PROCEDURE — 86140 C-REACTIVE PROTEIN: CPT | Performed by: INTERNAL MEDICINE

## 2023-01-31 PROCEDURE — 99214 OFFICE O/P EST MOD 30 MIN: CPT | Performed by: INTERNAL MEDICINE

## 2023-01-31 PROCEDURE — 85652 RBC SED RATE AUTOMATED: CPT | Performed by: INTERNAL MEDICINE

## 2023-01-31 NOTE — PROGRESS NOTES
Patient is a 57 y.o. female who is here for a follow up of pain.  Chief Complaint   Patient presents with   • Pain         HPI:    Patient c/o increase diffuse joint pain.  Worst in neck.  Does not sleep well.  Joints feel stiff in the am.  Moving helps.  Using otc tylenol and ibuprofen.  No per se weakness.  No skin rash.  Grand mother had RA.     History:     Patient Active Problem List   Diagnosis   • Menstrual migraine without status migrainosus, not intractable   • Abnormal liver enzymes   • Routine general medical examination at a health care facility   • Essential hypertension   • Dizziness   • Reactive depression   • Multiple joint pain   • Neck pain       Past Medical History:   Diagnosis Date   • Family Hx of osteoporosis MGM/osteopenia mother/patient normal DEXA age 51 2/15/2018   • Headache    • Hyperplastic colon polyp 2011 2/15/2018   • Hypertension        Past Surgical History:   Procedure Laterality Date   • CHOLECYSTECTOMY     • COLONOSCOPY W/ BIOPSIES  2011 and 2014    Hyperplastic 2014   • SKIN CANCER EXCISION      facial moles removed   • TUBAL ABDOMINAL LIGATION     • WISDOM TOOTH EXTRACTION         Current Outpatient Medications on File Prior to Visit   Medication Sig   • buPROPion XL (WELLBUTRIN XL) 150 MG 24 hr tablet TAKE ONE TABLET BY MOUTH EVERY MORNING   • estradiol (Estrace) 0.5 MG tablet Take 1 tablet by mouth Daily.   • methocarbamol (ROBAXIN) 750 MG tablet Take 1 tablet by mouth 3 (Three) Times a Day As Needed for Muscle Spasms.   • Multiple Vitamins-Minerals (PRESERVISION AREDS 2+MULTI VIT PO) Take 1 tablet by mouth Daily.   • olmesartan (BENICAR) 40 MG tablet TAKE ONE TABLET BY MOUTH ONCE NIGHTLY   • Progesterone (PROMETRIUM) 200 MG capsule Take 1 capsule by mouth Daily.   • [DISCONTINUED] spironolactone (ALDACTONE) 100 MG tablet Take 1 tablet by mouth Daily.     No current facility-administered medications on file prior to visit.       Family History   Problem Relation Age of Onset    • Ovarian cancer Maternal Grandmother 70   • Osteoporosis Maternal Grandmother    • Kidney disease Maternal Grandmother    • Skin cancer Maternal Grandmother    • Colon cancer Maternal Aunt    • Colon cancer Paternal Grandfather    • Osteoporosis Mother    • Hepatitis Father    • Lung cancer Father 57        smoker   • Heart disease Maternal Grandfather    • BRCA 1/2 Neg Hx    • Breast cancer Neg Hx    • Endometrial cancer Neg Hx        Social History     Socioeconomic History   • Marital status:    Tobacco Use   • Smoking status: Former     Packs/day: 0.50     Years: 13.00     Pack years: 6.50     Types: Cigarettes     Quit date:      Years since quittin.0   • Smokeless tobacco: Never   Vaping Use   • Vaping Use: Never used   Substance and Sexual Activity   • Alcohol use: Yes     Alcohol/week: 14.0 standard drinks     Types: 14 Glasses of wine per week     Comment: suggest 10 per week max   • Drug use: No   • Sexual activity: Yes     Partners: Male     Birth control/protection: Surgical         Review of Systems   Constitutional: Negative for chills and fever.   HENT: Negative for congestion, ear pain, hearing loss, rhinorrhea, sinus pressure, sore throat and trouble swallowing.    Eyes: Negative for discharge and itching.   Respiratory: Negative for cough, chest tightness and shortness of breath.    Cardiovascular: Negative for chest pain, palpitations and leg swelling.   Gastrointestinal: Negative for abdominal pain, blood in stool, constipation, diarrhea and vomiting.        2018 colonoscopy   Endocrine: Negative for polydipsia and polyuria.   Genitourinary: Negative for difficulty urinating, dysuria, enuresis, frequency, hematuria and urgency.         mammogram  Menses in    Musculoskeletal: Positive for arthralgias and neck pain. Negative for gait problem and joint swelling.   Skin: Negative for rash and wound.   Allergic/Immunologic: Negative for immunocompromised state.  "  Neurological: Negative for dizziness, syncope, weakness, light-headedness and numbness.   Hematological: Does not bruise/bleed easily.   Psychiatric/Behavioral: Negative for behavioral problems and dysphoric mood. The patient is not nervous/anxious.        /74   Pulse 74   Ht 167.6 cm (65.98\")   Wt 72.6 kg (160 lb)   LMP 11/13/2021   SpO2 99%   BMI 25.84 kg/m²       Physical Exam  Constitutional:       Appearance: Normal appearance. She is well-developed.   HENT:      Head: Normocephalic and atraumatic.      Right Ear: External ear normal.      Left Ear: External ear normal.      Nose: Nose normal.      Mouth/Throat:      Mouth: Mucous membranes are moist.      Pharynx: Oropharynx is clear.   Eyes:      Extraocular Movements: Extraocular movements intact.      Conjunctiva/sclera: Conjunctivae normal.      Pupils: Pupils are equal, round, and reactive to light.   Cardiovascular:      Rate and Rhythm: Normal rate and regular rhythm.      Heart sounds: Normal heart sounds.   Pulmonary:      Effort: Pulmonary effort is normal.      Breath sounds: Normal breath sounds.   Abdominal:      General: Bowel sounds are normal.      Palpations: Abdomen is soft.   Musculoskeletal:         General: Normal range of motion.      Cervical back: Normal range of motion and neck supple.   Lymphadenopathy:      Cervical: No cervical adenopathy.   Skin:     General: Skin is warm and dry.   Neurological:      General: No focal deficit present.      Mental Status: She is alert and oriented to person, place, and time.   Psychiatric:         Mood and Affect: Mood normal.         Behavior: Behavior normal.         Thought Content: Thought content normal.         Procedure:      Discussion/Summary:    HTN with mild LVH-stable on ARB  Migraine-stable  Abnormal wt gain-improved with Wellbutrin  Situational depression-cont Wellbutrin  Neck pain and pain in multiple joints-will check labs and cervical xray     1/31 labs noted and dw " patient    Current Outpatient Medications:   •  buPROPion XL (WELLBUTRIN XL) 150 MG 24 hr tablet, TAKE ONE TABLET BY MOUTH EVERY MORNING, Disp: 30 tablet, Rfl: 5  •  estradiol (Estrace) 0.5 MG tablet, Take 1 tablet by mouth Daily., Disp: 90 tablet, Rfl: 3  •  methocarbamol (ROBAXIN) 750 MG tablet, Take 1 tablet by mouth 3 (Three) Times a Day As Needed for Muscle Spasms., Disp: 20 tablet, Rfl: 0  •  Multiple Vitamins-Minerals (PRESERVISION AREDS 2+MULTI VIT PO), Take 1 tablet by mouth Daily., Disp: , Rfl:   •  olmesartan (BENICAR) 40 MG tablet, TAKE ONE TABLET BY MOUTH ONCE NIGHTLY, Disp: 90 tablet, Rfl: 3  •  Progesterone (PROMETRIUM) 200 MG capsule, Take 1 capsule by mouth Daily., Disp: 90 capsule, Rfl: 3        Diagnoses and all orders for this visit:    1. Essential hypertension (Primary)    2. Reactive depression    3. Multiple joint pain  -     CBC (No Diff)  -     Comprehensive Metabolic Panel  -     TSH  -     Rheumatoid Factor  -     Sedimentation Rate  -     Uric Acid  -     Cyclic Citrul Peptide Antibody, IgG / IgA  -     C-reactive Protein  -     KATHLEEN With / DsDNA, RNP, Sjogrens A / B, Smith    4. Neck pain  -     XR Spine Cervical 2 or 3 View

## 2023-02-01 LAB
DEPRECATED RDW RBC AUTO: 41.2 FL (ref 37–54)
ERYTHROCYTE [DISTWIDTH] IN BLOOD BY AUTOMATED COUNT: 11.7 % (ref 12.3–15.4)
ERYTHROCYTE [SEDIMENTATION RATE] IN BLOOD: 7 MM/HR (ref 0–30)
HCT VFR BLD AUTO: 39.3 % (ref 34–46.6)
HGB BLD-MCNC: 13.1 G/DL (ref 12–15.9)
MCH RBC QN AUTO: 32.6 PG (ref 26.6–33)
MCHC RBC AUTO-ENTMCNC: 33.3 G/DL (ref 31.5–35.7)
MCV RBC AUTO: 97.8 FL (ref 79–97)
PLATELET # BLD AUTO: 228 10*3/MM3 (ref 140–450)
PMV BLD AUTO: 10.5 FL (ref 6–12)
RBC # BLD AUTO: 4.02 10*6/MM3 (ref 3.77–5.28)
WBC NRBC COR # BLD: 4.44 10*3/MM3 (ref 3.4–10.8)

## 2023-02-02 DIAGNOSIS — M47.892 OTHER OSTEOARTHRITIS OF SPINE, CERVICAL REGION: Primary | ICD-10-CM

## 2023-02-02 LAB
ANA SER QL: NEGATIVE
CCP IGA+IGG SERPL IA-ACNC: 4 UNITS (ref 0–19)

## 2023-02-15 ENCOUNTER — TREATMENT (OUTPATIENT)
Dept: PHYSICAL THERAPY | Facility: CLINIC | Age: 58
End: 2023-02-15
Payer: COMMERCIAL

## 2023-02-15 DIAGNOSIS — M47.892 OTHER OSTEOARTHRITIS OF SPINE, CERVICAL REGION: Primary | ICD-10-CM

## 2023-02-15 PROCEDURE — 97161 PT EVAL LOW COMPLEX 20 MIN: CPT | Performed by: PHYSICAL THERAPIST

## 2023-02-15 PROCEDURE — 97140 MANUAL THERAPY 1/> REGIONS: CPT | Performed by: PHYSICAL THERAPIST

## 2023-02-15 NOTE — PROGRESS NOTES
"    Physical Therapy Initial Evaluation and Plan of Care    [facility]  [address] [place]. [address]      Patient: Karthik Ibrahim   : 1965  Diagnosis/ICD-10 Code:  Other osteoarthritis of spine, cervical region [M47.892]  Referring practitioner: Abram Adams MD  Date of Initial Visit: 2/15/2023  Today's Date: 2/15/2023  Patient seen for Visit count could not be calculated. Make sure you are using a visit which is associated with an episode. session         Visit Diagnoses:    ICD-10-CM ICD-9-CM   1. Other osteoarthritis of spine, cervical region  M47.892 721.0         Subjective Questionnaire: NDI:22      Subjective Evaluation    History of Present Illness  Mechanism of injury: Pt stated that last year she had a fall and hit her head.  She stated that her last therapy was more concussion and recall therapy.  She stated that she has had the cervical spine pain since she had the fall. 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 She stated that she feels that she has a bruised feeling of the cervical spine. She stated that she feels a \"velcro\" sounding pop and crack in the lumbar spine and cervical spine.       Patient Occupation: Pt drives a school bus Quality of life: good    Pain  Current pain ratin  At best pain ratin  At worst pain ratin  Location: cervical spine lumbar spine.   Quality: Bruising.   Relieving factors: change in position (pressure on the shoulders)  Aggravating factors: prolonged positioning and sleeping    Hand dominance: right    Treatments  Previous treatment: physical therapy  Patient Goals  Patient goals for therapy: decreased edema, increased motion, increased strength, independence with ADLs/IADLs and return to work             Objective          Palpation   Left   No palpable tenderness to the levator scapulae, lower trapezius, middle trapezius and upper trapezius. " "    Right   No palpable tenderness to the levator scapulae, lower trapezius, middle trapezius and upper trapezius.     Tenderness   Cervical Spine   Tenderness in the spinous process.     Additional Tenderness Details  Spinous process of C2-C6    Neurological Testing     Sensation   Cervical/Thoracic   Left   Intact: light touch    Right   Intact: light touch    Additional Neurological Details  She stated that she has a \"different sensation\" in the arm with tough to the right side only. She stated she had a \"shooting sensation\" into the right arm at the elbow. All light touch is intact.     Passive Range of Motion   Cervical/Thoracic Spine   Cervical   Flexion: 40 degrees   Extension: 38 degrees   Left lateral flexion: 20 degrees   Right lateral flexion: 23 degrees   Left rotation: 30 degrees   Right rotation: 42 degrees     Strength/Myotome Testing     Left Shoulder     Planes of Motion   Flexion: 4+   Abduction: 4   Adduction: 4   External rotation at 0°: 4   Internal rotation at 0°: 4     Right Shoulder     Planes of Motion   Flexion: 4   Abduction: 4-   Adduction: 4-   External rotation at 0°: 4-   Internal rotation at 0°: 4-           Assessment & Plan     Assessment  Impairments: abnormal muscle firing, abnormal or restricted ROM, activity intolerance, impaired physical strength and pain with function  Functional Limitations: carrying objects, lifting, reaching overhead and unable to perform repetitive tasks  Assessment details: Karthik is a pleasant female who presents into the clinic with a diagnosis of other osteoarthritis of the spine, cervical region.  She stated that she has completed therapy before for the cervical spine and for concussion protocol.  She reported today that she had tenderness in the cervical spine with palpation to all spinous processes.  She had tenderness per her report with cervical distraction in the area of the levator scap.  Tightness and tenderness was noted today with palpation " "and STM to the right upper trap and levator area.  Once sitting up she reported mild dizziness and stated that she feels like she has a \"dizzy\" feeling in general the majority of the time.  She stated that she also has a headache most of the time that feels like a band type feeling around the head at the level of the ears. Manual therapy was stopped once she reported headache sensation as to not flare up headache symptoms.  She was educated on her symptoms and her diagnosis as well as her plan of care. She would benefit from skilled therapy to improve her ROM, strength, and improve her pain and function at home and with work.     Goals  Plan Goals: Pt will report pain less than 2/10 allowing return to all ADLs, IADLs, and work/household activities.    The pt will demonstrate painfree cervical AROM of 55 deg flex, 50 deg ext, 70 deg R and L rotation and 35 deg SB'ing to allow mobility required for all ADLs and IADLs.    The pt will demonstrate 5/5 scapular/RTC and postural mm. strength to allow return to all premorbid functional activities.    The pt will report greater than 85% functional improvement with Neck Disability Index (NDI) less than 10% indicating significantly improved functional status.    Plan  Therapy options: will be seen for skilled therapy services  Planned modality interventions: traction and electrical stimulation/Russian stimulation  Planned therapy interventions: manual therapy, stretching, strengthening, spinal/joint mobilization, soft tissue mobilization and joint mobilization  Frequency: 2x week  Duration in visits: 16  Duration in weeks: 8        History # of Personal Factors and/or Comorbidities: LOW (0)  Examination of Body System(s): # of elements: LOW (1-2)  Clinical Presentation: STABLE   Clinical Decision Making: LOW       Timed:         Manual Therapy:    15     mins  20020;     Therapeutic Exercise:        mins  16543;     Neuromuscular Santi:        mins  74859;    Therapeutic " Activity:          mins  30658;     Gait Training:           mins  20386;     Ultrasound:          mins  97611;    Ionto                                   mins   90947  Self Care                            mins   31115  Canalith Repos         mins 34852      Un-Timed:  Electrical Stimulation:         mins  09122 ( );  Dry Needling          mins self-pay  Traction          mins 23705  Low Eval     55     Mins  91947  Mod Eval          Mins  32807  High Eval                            Mins  20015        Timed Treatment:   15   mins   Total Treatment:     70   mins          PT: Jitendra Matson PT     License Number: [id number]  Electronically signed by Jitendra Matson PT, 02/15/23, 11:10 AM EST    Certification Period: 2/15/2023 thru 5/15/2023  I certify that the therapy services are furnished while this patient is under my care.  The services outlined above are required by this patient, and will be reviewed every 90 days.         Physician Signature:__________________________________________________    PHYSICIAN: Abram Adams MD  NPI: [id number]                                      DATE:      Please sign and return via fax to [email or web address] . Thank you, [facility].

## 2023-02-20 ENCOUNTER — TREATMENT (OUTPATIENT)
Dept: PHYSICAL THERAPY | Facility: CLINIC | Age: 58
End: 2023-02-20
Payer: COMMERCIAL

## 2023-02-20 DIAGNOSIS — M47.892 OTHER OSTEOARTHRITIS OF SPINE, CERVICAL REGION: Primary | ICD-10-CM

## 2023-02-20 DIAGNOSIS — M54.2 PAIN, NECK: ICD-10-CM

## 2023-02-20 PROCEDURE — 97140 MANUAL THERAPY 1/> REGIONS: CPT | Performed by: PHYSICAL THERAPIST

## 2023-02-20 PROCEDURE — 97110 THERAPEUTIC EXERCISES: CPT | Performed by: PHYSICAL THERAPIST

## 2023-02-20 NOTE — PROGRESS NOTES
"Physical Therapy Daily Treatment Note    [facility]   [address], [address] [place] [address]      Patient: Karthik Ibrahim   : 1965  Referring practitioner: Abram Adams MD  Date of Initial Visit: Type: THERAPY  Noted: 2/15/2023  Today's Date: 2023  Patient seen for 2 sessions    Certification Period 2023 thru 2023       Visit Diagnoses:    ICD-10-CM ICD-9-CM   1. Other osteoarthritis of spine, cervical region  M47.892 721.0   2. Pain, neck  M54.2 723.1       Subjective     Karthik stated that she continues to feel the \"bruised\" feeling in the cervical spine.     Objective   See Exercise, Manual, and Modality Logs for complete treatment.       Assessment/Plan     Karthik was progressed today with her ROM and strengthening today.  She stated that she had pain into the right and left scapular area with palpation to the levator scap area.  Thoracic extension and PAs decreased her soreness and \"bruised\" feeling post.       Timed:         Manual Therapy:    15     mins  50783;     Therapeutic Exercise:    30     mins  20852;     Neuromuscular Santi:        mins  49912;    Therapeutic Activity:          mins  01730;     Gait Training:           mins  74598;     Ultrasound:          mins  01271;    Ionto                                   mins   29585  Self Care                            mins   34590  Canalith Repos         mins 42235  Electrical Stimulation:         mins  95944    Un-Timed:  Electrical Stimulation:         mins  60900 ( );  Dry Needling          mins self-pay  Traction          mins 26581      Timed Treatment:   45   mins   Total Treatment:     45   mins    Jitendra Matson PT, DPT  License #: [id number]              "

## 2023-02-24 ENCOUNTER — TREATMENT (OUTPATIENT)
Dept: PHYSICAL THERAPY | Facility: CLINIC | Age: 58
End: 2023-02-24
Payer: COMMERCIAL

## 2023-02-24 DIAGNOSIS — M47.892 OTHER OSTEOARTHRITIS OF SPINE, CERVICAL REGION: Primary | ICD-10-CM

## 2023-02-24 DIAGNOSIS — M54.2 PAIN, NECK: ICD-10-CM

## 2023-02-24 PROCEDURE — 97110 THERAPEUTIC EXERCISES: CPT | Performed by: PHYSICAL THERAPIST

## 2023-02-24 PROCEDURE — 97140 MANUAL THERAPY 1/> REGIONS: CPT | Performed by: PHYSICAL THERAPIST

## 2023-02-24 NOTE — PROGRESS NOTES
Physical Therapy Daily Treatment Note    [facility]   [address], [address] [place] [address]      Patient: Karthik Ibrahim   : 1965  Referring practitioner: Abram Adams MD  Date of Initial Visit: Type: THERAPY  Noted: 2/15/2023  Today's Date: 2023  Patient seen for 3 sessions    Certification Period 2023 thru 2023       Visit Diagnoses:    ICD-10-CM ICD-9-CM   1. Other osteoarthritis of spine, cervical region  M47.892 721.0   2. Pain, neck  M54.2 723.1       Subjective     Charing stated that feels looser today in the cervical spine.    Objective   See Exercise, Manual, and Modality Logs for complete treatment.       Assessment/Plan     Karthik was progressed today with cervical spine strengthening for the deep cervical flexors today. She reported that she had stiffness of the upper traps post strengthening.       Timed:         Manual Therapy:    15     mins  34923;     Therapeutic Exercise:    40     mins  16790;     Neuromuscular Santi:        mins  97701;    Therapeutic Activity:          mins  97619;     Gait Training:           mins  59280;     Ultrasound:          mins  04872;    Ionto                                   mins   32544  Self Care                            mins   46475  Canalith Repos         mins 16132  Electrical Stimulation:         mins  24637    Un-Timed:  Electrical Stimulation:         mins  17363 ( );  Dry Needling          mins self-pay  Traction          mins 33864      Timed Treatment:   55   mins   Total Treatment:     55   mins    Jitendra Matson PT, DPT  License #: [id number]

## 2023-03-03 ENCOUNTER — TREATMENT (OUTPATIENT)
Dept: PHYSICAL THERAPY | Facility: CLINIC | Age: 58
End: 2023-03-03
Payer: COMMERCIAL

## 2023-03-03 DIAGNOSIS — M47.892 OTHER OSTEOARTHRITIS OF SPINE, CERVICAL REGION: Primary | ICD-10-CM

## 2023-03-03 DIAGNOSIS — M54.2 PAIN, NECK: ICD-10-CM

## 2023-03-03 PROCEDURE — 97140 MANUAL THERAPY 1/> REGIONS: CPT | Performed by: PHYSICAL THERAPIST

## 2023-03-03 PROCEDURE — 97110 THERAPEUTIC EXERCISES: CPT | Performed by: PHYSICAL THERAPIST

## 2023-03-03 PROCEDURE — 97014 ELECTRIC STIMULATION THERAPY: CPT | Performed by: PHYSICAL THERAPIST

## 2023-03-07 NOTE — PROGRESS NOTES
Physical Therapy Daily Treatment Note    [facility]   [address], [address] [place] [address]      Patient: Karthik Ibrahim   : 1965  Referring practitioner: Abram Adams MD  Date of Initial Visit: Type: THERAPY  Noted: 2/15/2023  Today's Date: 3/7/2023  Patient seen for 4 sessions    Certification Period 3/7/2023 thru 2023       Visit Diagnoses:    ICD-10-CM ICD-9-CM   1. Other osteoarthritis of spine, cervical region  M47.892 721.0   2. Pain, neck  M54.2 723.1       Subjective     Karthik stated that she has been doing well but post the last session she had increased stiffness and pain.     Objective   See Exercise, Manual, and Modality Logs for complete treatment.       Assessment/Plan     Chin tuck with lift was held today due to the possibility of pain caused post.  She reported decreased discomfort with manual therapy. Dry needling was completed today with C2 homeostatic point and C3 paravertebral point. Subocciptal needling completed with .15MM needles. She reported no pain in the cervical spine with needle placement. Post needling she had no adverse reaction and was educated on her post procedure precautions and ways to decrease soreness. She reported decreased cervical stiffness post.       Timed:         Manual Therapy:    25     mins  34957;     Therapeutic Exercise:    30     mins  73485;     Neuromuscular Santi:        mins  03005;    Therapeutic Activity:          mins  71213;     Gait Training:           mins  62923;     Ultrasound:          mins  95023;    Ionto                                   mins   28354  Self Care                            mins   02110  Canalith Repos         mins 71225  Electrical Stimulation:         mins  70322    Un-Timed:  Electrical Stimulation:    15     mins  80927 ( );  Dry Needling          mins self-pay  Traction          mins 29868      Timed Treatment:   55   mins   Total Treatment:     70   mins    Jitendra Matson PT,  DPT  License #: [id number]

## 2023-03-10 ENCOUNTER — TREATMENT (OUTPATIENT)
Dept: PHYSICAL THERAPY | Facility: CLINIC | Age: 58
End: 2023-03-10
Payer: COMMERCIAL

## 2023-03-10 DIAGNOSIS — M54.2 PAIN, NECK: ICD-10-CM

## 2023-03-10 DIAGNOSIS — M47.892 OTHER OSTEOARTHRITIS OF SPINE, CERVICAL REGION: Primary | ICD-10-CM

## 2023-03-10 PROCEDURE — 97110 THERAPEUTIC EXERCISES: CPT | Performed by: PHYSICAL THERAPIST

## 2023-03-10 PROCEDURE — 97140 MANUAL THERAPY 1/> REGIONS: CPT | Performed by: PHYSICAL THERAPIST

## 2023-03-10 NOTE — PROGRESS NOTES
Physical Therapy Daily Treatment Note    [facility]   [address] [facility], [address] [place] [address]      Patient: Karthik Ibrahim   : 1965  Referring practitioner: Abram Adams MD  Date of Initial Visit: No linked episodes  Today's Date: 3/10/2023  Patient seen for Visit count could not be calculated. Make sure you are using a visit which is associated with an episode. sessions    Certification Period 3/10/2023 thru 2023       Visit Diagnoses:    ICD-10-CM ICD-9-CM   1. Other osteoarthritis of spine, cervical region  M47.892 721.0   2. Pain, neck  M54.2 723.1       Subjective     Karthik reported that she felt better post the dry needling this past visit.     Objective   See Exercise, Manual, and Modality Logs for complete treatment.       Assessment/Plan   Dry needling completed again today with needles being placed at the level of the C2 and C4 as well as in the suboccipital area.  She reported that she has been doing well at this time with decreased stiffness.  Decreased palpable tightness noted today on the right compared to left.  She demonstrated improvement in her cervical ROM post.       Timed:         Manual Therapy:    20     mins  73228;     Therapeutic Exercise:    10     mins  40047;     Neuromuscular Santi:        mins  29044;    Therapeutic Activity:          mins  79957;     Gait Training:           mins  12343;     Ultrasound:          mins  06016;    Ionto                                   mins   19734  Self Care                            mins   83643  Canalith Repos         mins 53722  Electrical Stimulation:         mins  56306    Un-Timed:  Electrical Stimulation:   mins  54321 ( );  Dry Needling          mins self-pay  Traction          mins 37357  Dry needling  30    mins 97433      Timed Treatment:   30   mins   Total Treatment:     45   mins    Jitendra Matson PT, DPT  License #: [id number]

## 2023-03-24 ENCOUNTER — TREATMENT (OUTPATIENT)
Dept: PHYSICAL THERAPY | Facility: CLINIC | Age: 58
End: 2023-03-24
Payer: COMMERCIAL

## 2023-03-24 DIAGNOSIS — M54.2 PAIN, NECK: ICD-10-CM

## 2023-03-24 DIAGNOSIS — M47.892 OTHER OSTEOARTHRITIS OF SPINE, CERVICAL REGION: Primary | ICD-10-CM

## 2023-03-24 PROCEDURE — 20560 NDL INSJ W/O NJX 1 OR 2 MUSC: CPT | Performed by: PHYSICAL THERAPIST

## 2023-03-24 PROCEDURE — 97110 THERAPEUTIC EXERCISES: CPT | Performed by: PHYSICAL THERAPIST

## 2023-03-24 PROCEDURE — 97014 ELECTRIC STIMULATION THERAPY: CPT | Performed by: PHYSICAL THERAPIST

## 2023-03-24 PROCEDURE — 97140 MANUAL THERAPY 1/> REGIONS: CPT | Performed by: PHYSICAL THERAPIST

## 2023-03-24 NOTE — PROGRESS NOTES
"Physical Therapy Daily Treatment Note    [facility]   [address] [facility], [address] [place] [address]      Patient: Karthik Ibrahim   : 1965  Referring practitioner: Abram Adams MD  Date of Initial Visit: Type: THERAPY  Noted: 2/15/2023  Today's Date: 3/24/2023  Patient seen for 5 sessions    Certification Period 3/24/2023 thru 2023       Visit Diagnoses:    ICD-10-CM ICD-9-CM   1. Other osteoarthritis of spine, cervical region  M47.892 721.0   2. Pain, neck  M54.2 723.1       Subjective     Karthik continues to report decreased discomfort in the cervical spine with less \"popping\" of her neck and back for relief.    Objective   See Exercise, Manual, and Modality Logs for complete treatment.       Assessment/Plan     Dry needling completed today with decreased pain noted post. She reported she had mild irritation of the cervical spine musculature with stretching today but stated she had a massage last week that made her have increased soreness.       Timed:         Manual Therapy:    15     mins  99482;     Therapeutic Exercise:    15     mins  33561;     Neuromuscular Santi:        mins  63767;    Therapeutic Activity:          mins  65648;     Gait Training:           mins  14499;     Ultrasound:          mins  87969;    Ionto                                   mins   35151  Self Care                            mins   02271  Canalith Repos         mins 66073  Electrical Stimulation:         mins  47600    Un-Timed:  Electrical Stimulation:   15      mins  71610 ( );  Dry Needling          mins self-pay  Traction          mins 08372  HC needle insertion without injection     10       Timed Treatment:   30   mins   Total Treatment:     55   mins    Jitendra Matson PT, DPT  License #: [id number]              "

## 2023-03-31 ENCOUNTER — TREATMENT (OUTPATIENT)
Dept: PHYSICAL THERAPY | Facility: CLINIC | Age: 58
End: 2023-03-31
Payer: COMMERCIAL

## 2023-03-31 DIAGNOSIS — M54.2 PAIN, NECK: ICD-10-CM

## 2023-03-31 DIAGNOSIS — M47.892 OTHER OSTEOARTHRITIS OF SPINE, CERVICAL REGION: Primary | ICD-10-CM

## 2023-03-31 PROCEDURE — 20561 NDL INSJ W/O NJX 3+ MUSC: CPT | Performed by: PHYSICAL THERAPIST

## 2023-03-31 PROCEDURE — 97140 MANUAL THERAPY 1/> REGIONS: CPT | Performed by: PHYSICAL THERAPIST

## 2023-03-31 PROCEDURE — 97110 THERAPEUTIC EXERCISES: CPT | Performed by: PHYSICAL THERAPIST

## 2023-03-31 NOTE — PROGRESS NOTES
"Re-Assessment / Re-Certification    Patient: Karthik Ibrahim   : 1965  Diagnosis/ICD-10 Code:  No primary diagnosis found.  Referring practitioner: Abram Adams MD  Date of Initial Visit: No linked episodes  Today's Date: 3/31/2023  Patient seen for Visit count could not be calculated. Make sure you are using a visit which is associated with an episode. sessions      Subjective:   Karthik Ibrahim reports: 70% improvement of the cervical spine issues.   Subjective Questionnaire: NDI:13  Clinical Progress: improved  Home Program Compliance: Yes  Treatment has included: therapeutic exercise, neuromuscular re-education, manual therapy and dry needling    Subjective Evaluation    Pain  Current pain ratin  At best pain ratin  At worst pain ratin         Objective          Active Range of Motion   Cervical/Thoracic Spine   Cervical    Flexion: 55 degrees   Extension: 53 degrees   Left lateral flexion: 30 degrees   Right lateral flexion: 30 degrees   Left rotation: 62 degrees   Right rotation: 53 degrees     Additional Active Range of Motion Details  Mild discomfort in the cervical spine with mobility but stated that she was doing well at this time.       Assessment & Plan     Assessment  Impairments: abnormal muscle firing, abnormal or restricted ROM, activity intolerance, impaired physical strength and pain with function  Functional Limitations: carrying objects, lifting, reaching overhead and unable to perform repetitive tasks  Assessment details: Karthik reported that she has been doing well at this time with minimal pain in the cervical spine.  She stated that she no longer feels that she needs to constantly \"twist and contort to pop\" the cervical spine and back to have comfort.  She reported that she has noticed less pain and discomfort in the cervical spine since beginning therapy with the highest pain rating being a 5/10 and stated that it comes very infrequently in the cervical spine.  "     Goals  Plan Goals: Pt will report pain less than 2/10 allowing return to all ADLs, IADLs, and work/household activities.    The pt will demonstrate painfree cervical AROM of 55 deg flex, 50 deg ext, 70 deg R and L rotation and 35 deg SB'ing to allow mobility required for all ADLs and IADLs.    The pt will report greater than 85% functional improvement with Neck Disability Index (NDI) less than 10% indicating significantly improved functional status.    Plan  Therapy options: will be seen for skilled therapy services  Planned modality interventions: traction and electrical stimulation/Russian stimulation  Planned therapy interventions: manual therapy, stretching, strengthening, spinal/joint mobilization, soft tissue mobilization and joint mobilization  Frequency: 1x week  Duration in visits: 8  Duration in weeks: 8      Progress toward previous goals: Partially Met    New Goals  Continues with current goals that are still applicable.       Recommendations: Continue as planned  Timeframe: 1 month  Prognosis to achieve goals: good    PT Signature: Jitendra Matson, PT      Based upon review of the patient's progress and continued therapy plan, it is my medical opinion that Karthik Ibrahim should continue physical therapy treatment at [facility]  [address] [place] [id number].    Signature: __________________________________  Abram Adams MD    Manual Therapy:    15     mins  09283;  Therapeutic Exercise:    15     mins  01407;     Neuromuscular Santi:        mins  92115;    Therapeutic Activity:          mins  85649;     Gait Training:           mins  14522;     Ultrasound:          mins  70384;    Electrical Stimulation:         mins  77172 ( );  Dry Needling     30     mins self-pay    Timed Treatment:   30   mins   Total Treatment:     60   mins

## 2023-04-14 ENCOUNTER — TREATMENT (OUTPATIENT)
Dept: PHYSICAL THERAPY | Facility: CLINIC | Age: 58
End: 2023-04-14
Payer: COMMERCIAL

## 2023-04-14 DIAGNOSIS — M47.892 OTHER OSTEOARTHRITIS OF SPINE, CERVICAL REGION: Primary | ICD-10-CM

## 2023-04-14 DIAGNOSIS — M54.2 PAIN, NECK: ICD-10-CM

## 2023-04-14 NOTE — PROGRESS NOTES
Physical Therapy Daily Treatment Note    [facility]   [address] [facility], [address] [place] [address]      Patient: Karthik Ibrahim   : 1965  Referring practitioner: Abram Adams MD  Date of Initial Visit: Type: THERAPY  Noted: 2/15/2023  Today's Date: 2023  Patient seen for 6 sessions    Certification Period 2023 thru 2023       Visit Diagnoses:    ICD-10-CM ICD-9-CM   1. Other osteoarthritis of spine, cervical region  M47.892 721.0   2. Pain, neck  M54.2 723.1       Subjective   Charing repeorted that she has increased neck and shoulder pain post her trip last week.    Objective   See Exercise, Manual, and Modality Logs for complete treatment.       Assessment/Plan     Dry needling was completed as well at [facility] to the right upper trap.  She reported post dry needling to the C2 and C3 area she felt as though she was looser then prior to entry.        Timed:         Manual Therapy:    15     mins  62169;     Therapeutic Exercise:    15     mins  67923;     Neuromuscular Santi:        mins  65764;    Therapeutic Activity:          mins  40646;     Gait Training:           mins  38809;     Ultrasound:          mins  71679;    Ionto                                   mins   71300  Self Care                            mins   29323  Canalith Repos         mins 32846  Electrical Stimulation:         mins  54549    Un-Timed:  Electrical Stimulation:         mins  69043 ( );  Dry Needling     30     mins self-pay  Traction          mins 43766      Timed Treatment:   30   mins   Total Treatment:     60   mins    Jitendra Matson PT, DPT  License #: [id number]

## 2023-04-19 ENCOUNTER — TREATMENT (OUTPATIENT)
Dept: PHYSICAL THERAPY | Facility: CLINIC | Age: 58
End: 2023-04-19
Payer: COMMERCIAL

## 2023-04-19 DIAGNOSIS — M47.892 OTHER OSTEOARTHRITIS OF SPINE, CERVICAL REGION: Primary | ICD-10-CM

## 2023-04-19 DIAGNOSIS — M54.2 PAIN, NECK: ICD-10-CM

## 2023-04-19 NOTE — PROGRESS NOTES
Physical Therapy Daily Treatment Note    [facility]   [address] [facility], [address] [place] [address]      Patient: Karthik Ibrahim   : 1965  Referring practitioner: Abram Adams MD  Date of Initial Visit: Type: THERAPY  Noted: 2/15/2023  Today's Date: 2023  Patient seen for 7 sessions    Certification Period 2023 thru 2023       Visit Diagnoses:    ICD-10-CM ICD-9-CM   1. Other osteoarthritis of spine, cervical region  M47.892 721.0   2. Pain, neck  M54.2 723.1       Subjective     Karthik stated that she is doing well today with her report of decreased pain since the last visit.    Objective   See Exercise, Manual, and Modality Logs for complete treatment.       Assessment/Plan     Katrhik was progressed today with her strengthening in the shoulder and postural musculature.  She stated that she had increased discomfort with ER and IR of the right shoulder. Dry needling placement today due to her report of decreased pain post last session      Timed:         Manual Therapy:    15     mins  86680;     Therapeutic Exercise:    15     mins  24606;     Neuromuscular Santi:        mins  58644;    Therapeutic Activity:          mins  81896;     Gait Training:           mins  42493;     Ultrasound:          mins  52576;    Ionto                                   mins   59366  Self Care                            mins   65296  Canalith Repos         mins 25076  Electrical Stimulation:         mins  28235    Un-Timed:  Electrical Stimulation:         mins  44865 ( );  Dry Needling     30     mins self-pay  Traction          mins 65203      Timed Treatment:   30   mins   Total Treatment:     30   mins    Jitendra Matson PT, DPT  License #: [id number]

## 2023-04-26 ENCOUNTER — TELEPHONE (OUTPATIENT)
Dept: PHYSICAL THERAPY | Facility: CLINIC | Age: 58
End: 2023-04-26
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
## 2023-05-22 RX ORDER — BUPROPION HYDROCHLORIDE 150 MG/1
TABLET ORAL
Qty: 30 TABLET | Refills: 5 | Status: SHIPPED | OUTPATIENT
Start: 2023-05-22

## 2023-07-06 PROBLEM — E66.3 OVERWEIGHT (BMI 25.0-29.9): Status: ACTIVE | Noted: 2023-07-06

## 2023-07-29 DIAGNOSIS — I10 ESSENTIAL HYPERTENSION: ICD-10-CM

## 2023-07-31 RX ORDER — OLMESARTAN MEDOXOMIL 40 MG/1
TABLET ORAL
Qty: 90 TABLET | Refills: 3 | Status: SHIPPED | OUTPATIENT
Start: 2023-07-31

## 2023-09-20 ENCOUNTER — TELEPHONE (OUTPATIENT)
Dept: OBSTETRICS AND GYNECOLOGY | Facility: CLINIC | Age: 58
End: 2023-09-20
Payer: COMMERCIAL

## 2023-09-20 RX ORDER — ESTRADIOL 0.5 MG/1
0.5 TABLET ORAL DAILY
Qty: 90 TABLET | Refills: 0 | Status: SHIPPED | OUTPATIENT
Start: 2023-09-20

## 2023-10-20 PROBLEM — Z01.419 NORMAL GYNECOLOGIC EXAMINATION: Status: ACTIVE | Noted: 2023-10-20

## 2023-10-25 ENCOUNTER — OFFICE VISIT (OUTPATIENT)
Dept: OBSTETRICS AND GYNECOLOGY | Facility: CLINIC | Age: 58
End: 2023-10-25
Payer: COMMERCIAL

## 2023-10-25 VITALS
BODY MASS INDEX: 26.29 KG/M2 | SYSTOLIC BLOOD PRESSURE: 114 MMHG | DIASTOLIC BLOOD PRESSURE: 86 MMHG | WEIGHT: 157.8 LBS | HEIGHT: 65 IN

## 2023-10-25 DIAGNOSIS — Z12.31 SCREENING MAMMOGRAM FOR BREAST CANCER: ICD-10-CM

## 2023-10-25 DIAGNOSIS — Z78.0 POSTMENOPAUSAL: ICD-10-CM

## 2023-10-25 DIAGNOSIS — N39.41 URGE INCONTINENCE: ICD-10-CM

## 2023-10-25 DIAGNOSIS — Z01.419 WELL WOMAN EXAM WITH ROUTINE GYNECOLOGICAL EXAM: Primary | ICD-10-CM
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
## 2023-10-27 ENCOUNTER — TELEPHONE (OUTPATIENT)
Dept: OBSTETRICS AND GYNECOLOGY | Facility: CLINIC | Age: 58
End: 2023-10-27

## 2023-10-27 DIAGNOSIS — R87.619 ENDOMETRIAL CELLS ON CERVICAL PAPANICOLAOU SMEAR INCONSISTENT WITH LAST MENSTRUAL PERIOD: Primary | ICD-10-CM

## 2023-10-27 LAB — REF LAB TEST METHOD: NORMAL

## 2023-11-06 NOTE — PROGRESS NOTES
"Subjective   Chief Complaint   Patient presents with    Follow-up     TVUS and Endo BX      Karthik Ibrahim is a 58 y.o. year old .  Patient's last menstrual period was 2021.  She presents to be seen for tvus and endometrial biopsy for endometrial cells noted on her pap.  She denies any bleeding or cramping.   She stopped her HRT after her annual and has noticed daily headaches and return of hot flashes.   The following portions of the patient's history were reviewed and updated as appropriate:current medications and allergies    Social History    Tobacco Use      Smoking status: Former        Packs/day: 0.50        Years: 13.00        Additional pack years: 0.00        Total pack years: 6.50        Types: Cigarettes        Quit date:         Years since quittin.8      Smokeless tobacco: Never         Objective   /82 (BP Location: Left arm, Patient Position: Sitting, Cuff Size: Adult)   Ht 165.1 cm (65\")   Wt 73 kg (161 lb)   LMP 2021   BMI 26.79 kg/m²   Pelvic exam: VULVA: normal appearing vulva with no masses, tenderness or lesions, VAGINA: normal appearing vagina with normal color and discharge, no lesions, CERVIX: normal appearing cervix without discharge or lesions.  Endometrial Biopsy    Date of procedure:  2023    Procedure documentation:    The cervix was grasped anterior at the 12 o'clock position.  The cavity sounded to 7 centimeters.  An endometrial biopsy specimen was obtained and multiple passes.  The tissue was sent for permanent histopathologic evaluation.  Tenaculum was removed from the cervix with scant bleeding.    Post procedure instructions: She was instructed to call us in 1 week's time if she has not heard from us otherwise.  If there is any significant fever or excessive bleeding or pain she is to call immediately.        Lab Review   No data reviewed    Imaging   Pelvic ultrasound report   Tvus in office today shows endometrial lining 2.1mm   "   Assessment   Endometrial cells noted on pap  Headaches and vasomotor symptoms since stopping HRT     Plan   Reviewed ultrasound findings with patient, endometrial biopsy performed in office today  Restart hrt- see if headaches and hot flashes improve.  The importance of keeping all planned follow-up and taking all medications as prescribed was emphasized.  Rtc for annual or prn      No orders of the defined types were placed in this encounter.         This note was electronically signed.    Lina Whyte, ALMA  November 8, 2023

## 2023-11-08 ENCOUNTER — OFFICE VISIT (OUTPATIENT)
Dept: OBSTETRICS AND GYNECOLOGY | Facility: CLINIC | Age: 58
End: 2023-11-08
Payer: COMMERCIAL

## 2023-11-08 VITALS
HEIGHT: 65 IN | WEIGHT: 161 LBS | SYSTOLIC BLOOD PRESSURE: 130 MMHG | DIASTOLIC BLOOD PRESSURE: 82 MMHG | BODY MASS INDEX: 26.82 KG/M2

## 2023-11-08 DIAGNOSIS — N95.1 MENOPAUSAL SYMPTOMS: ICD-10-CM

## 2023-11-08 DIAGNOSIS — R87.619 ENDOMETRIAL CELLS ON CERVICAL PAP SMEAR INCONSISTENT W/LMP: Primary | ICD-10-CM

## 2023-11-08 RX ORDER — PROGESTERONE 200 MG/1
200 CAPSULE ORAL DAILY
Qty: 90 CAPSULE | Refills: 3 | Status: SHIPPED | OUTPATIENT
Start: 2023-11-08

## 2023-11-08 RX ORDER — ESTRADIOL 0.5 MG/1
0.5 TABLET ORAL DAILY
Qty: 90 TABLET | Refills: 3 | Status: SHIPPED | OUTPATIENT
Start: 2023-11-08

## 2023-11-08 RX ORDER — ESTRADIOL 0.5 MG/1
0.5 TABLET ORAL DAILY
Qty: 90 TABLET | Refills: 0 | OUTPATIENT
Start: 2023-11-08

## 2023-11-08 RX ORDER — SEMAGLUTIDE 0.25 MG/.5ML
INJECTION, SOLUTION SUBCUTANEOUS
COMMUNITY
Start: 2023-08-07

## 2023-11-09 LAB — REF LAB TEST METHOD: NORMAL

## 2023-12-20 ENCOUNTER — HOSPITAL ENCOUNTER (OUTPATIENT)
Dept: BONE DENSITY | Facility: HOSPITAL | Age: 58
Discharge: HOME | End: 2023-12-20
Payer: COMMERCIAL

## 2023-12-20 ENCOUNTER — HOSPITAL ENCOUNTER (OUTPATIENT)
Dept: MAMMOGRAPHY | Facility: HOSPITAL | Age: 58
Discharge: HOME | End: 2023-12-20
Payer: COMMERCIAL

## 2023-12-20 DIAGNOSIS — Z12.31 SCREENING MAMMOGRAM FOR BREAST CANCER: ICD-10-CM

## 2023-12-20 DIAGNOSIS — Z78.0 POSTMENOPAUSAL: ICD-10-CM

## 2023-12-20 PROCEDURE — 77067 SCR MAMMO BI INCL CAD: CPT

## 2023-12-20 PROCEDURE — 77063 BREAST TOMOSYNTHESIS BI: CPT

## 2023-12-20 PROCEDURE — 77080 DXA BONE DENSITY AXIAL: CPT

## 2024-01-03 ENCOUNTER — TELEPHONE (OUTPATIENT)
Dept: INTERNAL MEDICINE | Facility: CLINIC | Age: 59
End: 2024-01-03
Payer: COMMERCIAL

## 2024-01-03 NOTE — TELEPHONE ENCOUNTER
Caller: Karthik Ibrahim A    Relationship to patient: Self    Best call back number: [phone]     Additional notes: SHE GOT A CALL TO CONFIRM HER APPOINTMENT FOR 1/9. SHE DOES NOT FEEL IT IS NECESSARY BECAUSE THE MEDICATION (WEGOVY) WAS ONLY TAKEN FOR A MONTH. SHE HAS NOT BEEN ON IT SINCE AUGUST.  PLEASE CALL TO CONFIRM THE PURPOSE OF THE VISIT.

## 2024-01-23 ENCOUNTER — OFFICE VISIT (OUTPATIENT)
Dept: INTERNAL MEDICINE | Facility: CLINIC | Age: 59
End: 2024-01-23
Payer: COMMERCIAL

## 2024-01-23 VITALS
OXYGEN SATURATION: 95 % | SYSTOLIC BLOOD PRESSURE: 116 MMHG | BODY MASS INDEX: 27.16 KG/M2 | HEIGHT: 65 IN | WEIGHT: 163 LBS | HEART RATE: 66 BPM | DIASTOLIC BLOOD PRESSURE: 78 MMHG

## 2024-01-23 DIAGNOSIS — F41.9 ANXIETY: ICD-10-CM

## 2024-01-23 DIAGNOSIS — E66.3 OVERWEIGHT (BMI 25.0-29.9): Primary | ICD-10-CM

## 2024-01-23 DIAGNOSIS — I10 ESSENTIAL HYPERTENSION: ICD-10-CM

## 2024-01-23 RX ORDER — SEMAGLUTIDE 0.5 MG/.5ML
0.5 INJECTION, SOLUTION SUBCUTANEOUS
Qty: 2 ML | Refills: 5 | Status: SHIPPED | OUTPATIENT
Start: 2024-01-23

## 2024-01-23 RX ORDER — DUTASTERIDE 0.5 MG/1
0.5 CAPSULE, LIQUID FILLED ORAL DAILY
COMMUNITY

## 2024-01-23 NOTE — PROGRESS NOTES
Patient is a 58 y.o. female who is here for a follow up of hypertension.  Chief Complaint   Patient presents with    Hypertension         HPI:    Here for mgmt of HTN and depression.  Tolerated 0.25 mg of Wegovy.  Wanting to increase the dose.  No dizziness or lightheadedness.  No abdominal pains.  No fever or chills.  Occasional HAs.      History:     Patient Active Problem List   Diagnosis    Menstrual migraine without status migrainosus, not intractable    Abnormal liver enzymes    Routine general medical examination at a health care facility    Essential hypertension    Dizziness    Reactive depression    Multiple joint pain    Neck pain    Overweight (BMI 25.0-29.9)    Normal gynecologic examination       Past Medical History:   Diagnosis Date    Family Hx of osteoporosis MGM/osteopenia mother/patient normal DEXA age 51 02/15/2018    Headache     Hyperplastic colon polyp 2011 02/15/2018    Hypertension     Routine general medical examination at a health care facility 07/22/2020       Past Surgical History:   Procedure Laterality Date    CHOLECYSTECTOMY      COLONOSCOPY W/ BIOPSIES  2011 and 2014    Hyperplastic 2014    SKIN CANCER EXCISION      facial moles removed    TUBAL ABDOMINAL LIGATION      WISDOM TOOTH EXTRACTION         Current Outpatient Medications on File Prior to Visit   Medication Sig    buPROPion XL (WELLBUTRIN XL) 300 MG 24 hr tablet Take 1 tablet by mouth Every Morning.    dutasteride (AVODART) 0.5 MG capsule Take 1 capsule by mouth Daily.    estradiol (Estrace) 0.5 MG tablet Take 1 tablet by mouth Daily.    Multiple Vitamins-Minerals (PRESERVISION AREDS 2+MULTI VIT PO) Take 1 tablet by mouth Daily.    olmesartan (BENICAR) 40 MG tablet TAKE ONE TABLET BY MOUTH ONCE NIGHTLY    Progesterone (PROMETRIUM) 200 MG capsule Take 1 capsule by mouth Daily.    tiZANidine (Zanaflex) 4 MG tablet Take 1 tablet by mouth At Night As Needed for Muscle Spasms.    [DISCONTINUED] Semaglutide-Weight Management  (Wegovy) 0.25 MG/0.5ML solution auto-injector Waiting to get this medication on back order (Patient not taking: Reported on 2024)     No current facility-administered medications on file prior to visit.       Family History   Problem Relation Age of Onset    Osteoporosis Mother     Hepatitis Father     Lung cancer Father 57        smoker    Ovarian cancer Maternal Grandmother 70    Osteoporosis Maternal Grandmother     Kidney disease Maternal Grandmother     Skin cancer Maternal Grandmother     Colon cancer Maternal Aunt     Heart disease Maternal Grandfather     Colon cancer Paternal Grandfather     BRCA 1/2 Neg Hx     Endometrial cancer Neg Hx     Breast cancer Neg Hx        Social History     Socioeconomic History    Marital status:     Number of children: 4    Highest education level: Some college, no degree   Tobacco Use    Smoking status: Former     Packs/day: 0.50     Years: 13.00     Additional pack years: 0.00     Total pack years: 6.50     Types: Cigarettes     Quit date:      Years since quittin.0    Smokeless tobacco: Never   Vaping Use    Vaping Use: Never used   Substance and Sexual Activity    Alcohol use: Yes     Alcohol/week: 14.0 standard drinks of alcohol     Types: 14 Glasses of wine per week     Comment: suggest 10 per week max    Drug use: No    Sexual activity: Yes     Partners: Male     Birth control/protection: Surgical, Tubal ligation         Review of Systems   Constitutional:  Negative for chills and fever.   HENT:  Negative for congestion, ear pain, hearing loss, rhinorrhea, sinus pressure, sore throat and trouble swallowing.    Eyes:  Negative for discharge and itching.   Respiratory:  Negative for cough, chest tightness and shortness of breath.    Cardiovascular:  Negative for chest pain, palpitations and leg swelling.   Gastrointestinal:  Negative for abdominal pain, blood in stool, constipation, diarrhea and vomiting.        2018 colonoscopy, repeat in 10 years  "  Endocrine: Negative for polydipsia and polyuria.   Genitourinary:  Negative for difficulty urinating, dysuria, enuresis, frequency, hematuria and urgency.        12/23 mammogram  Menses in 8/20   Musculoskeletal:  Positive for arthralgias and neck pain. Negative for gait problem and joint swelling.   Skin:  Negative for rash and wound.   Allergic/Immunologic: Negative for immunocompromised state.   Neurological:  Negative for dizziness, syncope, weakness, light-headedness and numbness.   Hematological:  Does not bruise/bleed easily.   Psychiatric/Behavioral:  Positive for sleep disturbance. Negative for behavioral problems and dysphoric mood. The patient is not nervous/anxious.        /78   Pulse 66   Ht 165.1 cm (65\")   Wt 73.9 kg (163 lb)   LMP 11/13/2021   SpO2 95%   BMI 27.12 kg/m²       Physical Exam  Constitutional:       Appearance: Normal appearance. She is well-developed.   HENT:      Head: Normocephalic and atraumatic.      Right Ear: External ear normal.      Left Ear: External ear normal.      Nose: Nose normal.      Mouth/Throat:      Mouth: Mucous membranes are moist.      Pharynx: Oropharynx is clear.   Eyes:      Extraocular Movements: Extraocular movements intact.      Conjunctiva/sclera: Conjunctivae normal.      Pupils: Pupils are equal, round, and reactive to light.   Cardiovascular:      Rate and Rhythm: Normal rate and regular rhythm.      Heart sounds: Normal heart sounds.   Pulmonary:      Effort: Pulmonary effort is normal.      Breath sounds: Normal breath sounds.   Abdominal:      General: Bowel sounds are normal.      Palpations: Abdomen is soft.   Musculoskeletal:         General: Normal range of motion.      Cervical back: Normal range of motion and neck supple.   Lymphadenopathy:      Cervical: No cervical adenopathy.   Skin:     General: Skin is warm and dry.   Neurological:      General: No focal deficit present.      Mental Status: She is alert and oriented to person, " place, and time.   Psychiatric:         Mood and Affect: Mood normal.         Behavior: Behavior normal.         Thought Content: Thought content normal.         Procedure:      Discussion/Summary:    HTN with mild LVH-stable on ARB  Migraine-stable  Abnormal wt gain-improved with Wellbutrin  Situational depression-increase Wellbutrin  Neck pain and pain in multiple joints-Tylenol prn  Obesity,BMI over 27 with HTN-counseled on diet, will increase Wegovy to 0.5 mg     prior labs noted and dw patient, counseled on low carb/ncs    Current Outpatient Medications:     buPROPion XL (WELLBUTRIN XL) 300 MG 24 hr tablet, Take 1 tablet by mouth Every Morning., Disp: 90 tablet, Rfl: 3    dutasteride (AVODART) 0.5 MG capsule, Take 1 capsule by mouth Daily., Disp: , Rfl:     estradiol (Estrace) 0.5 MG tablet, Take 1 tablet by mouth Daily., Disp: 90 tablet, Rfl: 3    Multiple Vitamins-Minerals (PRESERVISION AREDS 2+MULTI VIT PO), Take 1 tablet by mouth Daily., Disp: , Rfl:     olmesartan (BENICAR) 40 MG tablet, TAKE ONE TABLET BY MOUTH ONCE NIGHTLY, Disp: 90 tablet, Rfl: 3    Progesterone (PROMETRIUM) 200 MG capsule, Take 1 capsule by mouth Daily., Disp: 90 capsule, Rfl: 3    tiZANidine (Zanaflex) 4 MG tablet, Take 1 tablet by mouth At Night As Needed for Muscle Spasms., Disp: 30 tablet, Rfl: 5    Semaglutide-Weight Management (Wegovy) 0.5 MG/0.5ML solution auto-injector, Inject 0.5 mL under the skin into the appropriate area as directed Every 7 (Seven) Days., Disp: 2 mL, Rfl: 5        Diagnoses and all orders for this visit:    1. Overweight (BMI 25.0-29.9) (Primary)  -     Semaglutide-Weight Management (Wegovy) 0.5 MG/0.5ML solution auto-injector; Inject 0.5 mL under the skin into the appropriate area as directed Every 7 (Seven) Days.  Dispense: 2 mL; Refill: 5    2. Essential hypertension    3. Anxiety

## 2024-07-04 DIAGNOSIS — E66.9 OBESITY WITH SERIOUS COMORBIDITY, UNSPECIFIED CLASSIFICATION, UNSPECIFIED OBESITY TYPE: ICD-10-CM

## 2024-07-05 RX ORDER — SEMAGLUTIDE 0.25 MG/.5ML
INJECTION, SOLUTION SUBCUTANEOUS
Qty: 2 ML | Refills: 2 | OUTPATIENT
Start: 2024-07-05

## 2024-07-10 ENCOUNTER — OFFICE VISIT (OUTPATIENT)
Dept: INTERNAL MEDICINE | Facility: CLINIC | Age: 59
End: 2024-07-10
Payer: COMMERCIAL

## 2024-07-10 ENCOUNTER — LAB (OUTPATIENT)
Dept: LAB | Facility: HOSPITAL | Age: 59
End: 2024-07-10
Payer: COMMERCIAL

## 2024-07-10 VITALS
HEART RATE: 71 BPM | HEIGHT: 65 IN | WEIGHT: 165 LBS | OXYGEN SATURATION: 98 % | BODY MASS INDEX: 27.49 KG/M2 | SYSTOLIC BLOOD PRESSURE: 122 MMHG | DIASTOLIC BLOOD PRESSURE: 76 MMHG

## 2024-07-10 DIAGNOSIS — F32.9 REACTIVE DEPRESSION: ICD-10-CM

## 2024-07-10 DIAGNOSIS — M25.50 MULTIPLE JOINT PAIN: ICD-10-CM

## 2024-07-10 DIAGNOSIS — Z00.00 ROUTINE GENERAL MEDICAL EXAMINATION AT A HEALTH CARE FACILITY: ICD-10-CM

## 2024-07-10 DIAGNOSIS — E66.3 OVERWEIGHT (BMI 25.0-29.9): ICD-10-CM

## 2024-07-10 DIAGNOSIS — I10 ESSENTIAL HYPERTENSION: Primary | ICD-10-CM

## 2024-07-10 LAB
ALBUMIN SERPL-MCNC: 4.7 G/DL (ref 3.5–5.2)
ALBUMIN/GLOB SERPL: 2 G/DL
ALP SERPL-CCNC: 67 U/L (ref 39–117)
ALT SERPL W P-5'-P-CCNC: 17 U/L (ref 1–33)
ANION GAP SERPL CALCULATED.3IONS-SCNC: 11.5 MMOL/L (ref 5–15)
AST SERPL-CCNC: 25 U/L (ref 1–32)
BACTERIA UR QL AUTO: ABNORMAL /HPF
BILIRUB SERPL-MCNC: 0.7 MG/DL (ref 0–1.2)
BILIRUB UR QL STRIP: NEGATIVE
BUN SERPL-MCNC: 19 MG/DL (ref 6–20)
BUN/CREAT SERPL: 27.1 (ref 7–25)
CALCIUM SPEC-SCNC: 9.2 MG/DL (ref 8.6–10.5)
CHLORIDE SERPL-SCNC: 103 MMOL/L (ref 98–107)
CHOLEST SERPL-MCNC: 172 MG/DL (ref 0–200)
CLARITY UR: CLEAR
CO2 SERPL-SCNC: 25.5 MMOL/L (ref 22–29)
COLOR UR: YELLOW
CREAT SERPL-MCNC: 0.7 MG/DL (ref 0.57–1)
DEPRECATED RDW RBC AUTO: 43.8 FL (ref 37–54)
EGFRCR SERPLBLD CKD-EPI 2021: 101.6 ML/MIN/1.73
ERYTHROCYTE [DISTWIDTH] IN BLOOD BY AUTOMATED COUNT: 12.1 % (ref 12.3–15.4)
GLOBULIN UR ELPH-MCNC: 2.4 GM/DL
GLUCOSE SERPL-MCNC: 86 MG/DL (ref 65–99)
GLUCOSE UR STRIP-MCNC: NEGATIVE MG/DL
HCT VFR BLD AUTO: 40.6 % (ref 34–46.6)
HDLC SERPL-MCNC: 72 MG/DL (ref 40–60)
HGB BLD-MCNC: 13.8 G/DL (ref 12–15.9)
HGB UR QL STRIP.AUTO: ABNORMAL
HYALINE CASTS UR QL AUTO: ABNORMAL /LPF
KETONES UR QL STRIP: NEGATIVE
LDLC SERPL CALC-MCNC: 87 MG/DL (ref 0–100)
LDLC/HDLC SERPL: 1.19 {RATIO}
LEUKOCYTE ESTERASE UR QL STRIP.AUTO: NEGATIVE
MCH RBC QN AUTO: 33.8 PG (ref 26.6–33)
MCHC RBC AUTO-ENTMCNC: 34 G/DL (ref 31.5–35.7)
MCV RBC AUTO: 99.5 FL (ref 79–97)
NITRITE UR QL STRIP: NEGATIVE
PH UR STRIP.AUTO: 6 [PH] (ref 5–8)
PLATELET # BLD AUTO: 240 10*3/MM3 (ref 140–450)
PMV BLD AUTO: 10.1 FL (ref 6–12)
POTASSIUM SERPL-SCNC: 4.5 MMOL/L (ref 3.5–5.2)
PROT SERPL-MCNC: 7.1 G/DL (ref 6–8.5)
PROT UR QL STRIP: NEGATIVE
RBC # BLD AUTO: 4.08 10*6/MM3 (ref 3.77–5.28)
RBC # UR STRIP: ABNORMAL /HPF
REF LAB TEST METHOD: ABNORMAL
SODIUM SERPL-SCNC: 140 MMOL/L (ref 136–145)
SP GR UR STRIP: 1.02 (ref 1–1.03)
SQUAMOUS #/AREA URNS HPF: ABNORMAL /HPF
TRIGL SERPL-MCNC: 70 MG/DL (ref 0–150)
TSH SERPL DL<=0.05 MIU/L-ACNC: 2.05 UIU/ML (ref 0.27–4.2)
UROBILINOGEN UR QL STRIP: ABNORMAL
VIT B12 BLD-MCNC: 682 PG/ML (ref 211–946)
VLDLC SERPL-MCNC: 13 MG/DL (ref 5–40)
WBC # UR STRIP: ABNORMAL /HPF
WBC NRBC COR # BLD AUTO: 4.62 10*3/MM3 (ref 3.4–10.8)

## 2024-07-10 PROCEDURE — 80050 GENERAL HEALTH PANEL: CPT | Performed by: INTERNAL MEDICINE

## 2024-07-10 PROCEDURE — 36415 COLL VENOUS BLD VENIPUNCTURE: CPT | Performed by: INTERNAL MEDICINE

## 2024-07-10 PROCEDURE — 99396 PREV VISIT EST AGE 40-64: CPT | Performed by: INTERNAL MEDICINE

## 2024-07-10 PROCEDURE — 99214 OFFICE O/P EST MOD 30 MIN: CPT | Performed by: INTERNAL MEDICINE

## 2024-07-10 PROCEDURE — 80061 LIPID PANEL: CPT | Performed by: INTERNAL MEDICINE

## 2024-07-10 PROCEDURE — 82607 VITAMIN B-12: CPT | Performed by: INTERNAL MEDICINE

## 2024-07-10 PROCEDURE — 81001 URINALYSIS AUTO W/SCOPE: CPT | Performed by: INTERNAL MEDICINE

## 2024-07-10 RX ORDER — CELECOXIB 200 MG/1
200 CAPSULE ORAL DAILY PRN
Qty: 30 CAPSULE | Refills: 2 | Status: SHIPPED | OUTPATIENT
Start: 2024-07-10

## 2024-07-10 RX ORDER — SEMAGLUTIDE 0.5 MG/.5ML
0.5 INJECTION, SOLUTION SUBCUTANEOUS
Qty: 2 ML | Refills: 5 | Status: SHIPPED | OUTPATIENT
Start: 2024-07-10

## 2024-07-10 NOTE — PROGRESS NOTES
Patient is a 58 y.o. female who is here for a physical.  Chief Complaint   Patient presents with    Annual Exam         HPI:    Here for CPE.  Today having issues with increased joint pain, worst in the hips.  Worst with inactivity.  Using otc Ibuprofen.  Was a Marathon runner in the past.     History:     Patient Active Problem List   Diagnosis    Menstrual migraine without status migrainosus, not intractable    Abnormal liver enzymes    Routine general medical examination at a health care facility    Essential hypertension    Dizziness    Reactive depression    Multiple joint pain    Neck pain    Overweight (BMI 25.0-29.9)    Normal gynecologic examination       Past Medical History:   Diagnosis Date    Family Hx of osteoporosis MGM/osteopenia mother/patient normal DEXA age 51 02/15/2018    Headache     Hyperplastic colon polyp 2011 02/15/2018    Hypertension     Routine general medical examination at a health care facility 07/22/2020       Past Surgical History:   Procedure Laterality Date    CHOLECYSTECTOMY      COLONOSCOPY W/ BIOPSIES  2011 and 2014    Hyperplastic 2014    SKIN CANCER EXCISION      facial moles removed    TUBAL ABDOMINAL LIGATION      WISDOM TOOTH EXTRACTION         Current Outpatient Medications on File Prior to Visit   Medication Sig    buPROPion XL (WELLBUTRIN XL) 300 MG 24 hr tablet Take 1 tablet by mouth Every Morning.    dutasteride (AVODART) 0.5 MG capsule Take 1 capsule by mouth Daily.    estradiol (Estrace) 0.5 MG tablet Take 1 tablet by mouth Daily.    Multiple Vitamins-Minerals (PRESERVISION AREDS 2+MULTI VIT PO) Take 1 tablet by mouth Daily.    olmesartan (BENICAR) 40 MG tablet TAKE ONE TABLET BY MOUTH ONCE NIGHTLY    Progesterone (PROMETRIUM) 200 MG capsule Take 1 capsule by mouth Daily.    tiZANidine (Zanaflex) 4 MG tablet Take 1 tablet by mouth At Night As Needed for Muscle Spasms.    [DISCONTINUED] Semaglutide-Weight Management (Wegovy) 0.5 MG/0.5ML solution auto-injector Inject  0.5 mL under the skin into the appropriate area as directed Every 7 (Seven) Days. (Patient not taking: Reported on 7/10/2024)     No current facility-administered medications on file prior to visit.       Family History   Problem Relation Age of Onset    Osteoporosis Mother     Hepatitis Father     Lung cancer Father 57        smoker    Ovarian cancer Maternal Grandmother 70    Osteoporosis Maternal Grandmother     Kidney disease Maternal Grandmother     Skin cancer Maternal Grandmother     Colon cancer Maternal Aunt     Heart disease Maternal Grandfather     Colon cancer Paternal Grandfather     BRCA 1/2 Neg Hx     Endometrial cancer Neg Hx     Breast cancer Neg Hx        Social History     Socioeconomic History    Marital status:     Number of children: 4    Highest education level: Some college, no degree   Tobacco Use    Smoking status: Former     Current packs/day: 0.00     Average packs/day: 0.5 packs/day for 13.0 years (6.5 ttl pk-yrs)     Types: Cigarettes     Start date:      Quit date:      Years since quittin.5    Smokeless tobacco: Never   Vaping Use    Vaping status: Never Used   Substance and Sexual Activity    Alcohol use: Yes     Alcohol/week: 14.0 standard drinks of alcohol     Types: 14 Glasses of wine per week     Comment: suggest 10 per week max    Drug use: No    Sexual activity: Yes     Partners: Male     Birth control/protection: Surgical, Tubal ligation         Review of Systems   Constitutional:  Negative for chills and fever.   HENT:  Negative for congestion, ear pain, hearing loss, rhinorrhea, sinus pressure, sore throat and trouble swallowing.    Eyes:  Negative for discharge and itching.   Respiratory:  Negative for cough, chest tightness and shortness of breath.    Cardiovascular:  Negative for chest pain, palpitations and leg swelling.   Gastrointestinal:  Negative for abdominal pain, blood in stool, constipation, diarrhea and vomiting.        2018 colonoscopy,  "repeat in 10 years   Endocrine: Negative for polydipsia and polyuria.   Genitourinary:  Negative for difficulty urinating, dysuria, enuresis, frequency, hematuria and urgency.        12/23 mammogram  Menses in 8/20   Musculoskeletal:  Positive for arthralgias. Negative for gait problem and joint swelling.   Skin:  Negative for rash and wound.   Allergic/Immunologic: Negative for immunocompromised state.   Neurological:  Negative for dizziness, syncope, weakness, light-headedness and numbness.   Hematological:  Does not bruise/bleed easily.   Psychiatric/Behavioral:  Positive for sleep disturbance. Negative for behavioral problems and dysphoric mood. The patient is not nervous/anxious.        /76   Pulse 71   Ht 165.1 cm (65\")   Wt 74.8 kg (165 lb)   LMP 11/13/2021   SpO2 98%   BMI 27.46 kg/m²       Physical Exam  Constitutional:       Appearance: Normal appearance. She is well-developed. She is obese.   HENT:      Head: Normocephalic and atraumatic.      Right Ear: External ear normal.      Left Ear: External ear normal.      Nose: Nose normal.      Mouth/Throat:      Mouth: Mucous membranes are moist.      Pharynx: Oropharynx is clear.   Eyes:      Extraocular Movements: Extraocular movements intact.      Conjunctiva/sclera: Conjunctivae normal.      Pupils: Pupils are equal, round, and reactive to light.   Cardiovascular:      Rate and Rhythm: Normal rate and regular rhythm.      Heart sounds: Normal heart sounds.   Pulmonary:      Effort: Pulmonary effort is normal.      Breath sounds: Normal breath sounds.   Abdominal:      General: Bowel sounds are normal.      Palpations: Abdomen is soft.   Musculoskeletal:         General: Normal range of motion.      Cervical back: Normal range of motion and neck supple.   Lymphadenopathy:      Cervical: No cervical adenopathy.   Skin:     General: Skin is warm and dry.   Neurological:      General: No focal deficit present.      Mental Status: She is alert and " oriented to person, place, and time.   Psychiatric:         Mood and Affect: Mood normal.         Behavior: Behavior normal.         Thought Content: Thought content normal.         Procedure:      Discussion/Summary:    HME-counseled on diet and exercise, fasting labs soon  HTN with mild LVH-stable on ARB  Migraine-stable  Abnormal wt gain-stable  Situational depression-cont Wellbutrin  Neck pain and pain in multiple joints-Tylenol prn, add celebrex prn  Obesity,BMI over 27 with HTN-counseled on diet, will increase Wegovy to 0.5 mg     prior labs noted and dw patient, counseled on low carb/ncs    Reviewed the following with the patient: advised patient to avoid alcoholic beverages, encouraged patient to exercise 5-7 days per week for 30 minutes at a time, ideal body weight discussed with patient, and weight loss encouraged.             Current Outpatient Medications:     buPROPion XL (WELLBUTRIN XL) 300 MG 24 hr tablet, Take 1 tablet by mouth Every Morning., Disp: 90 tablet, Rfl: 3    dutasteride (AVODART) 0.5 MG capsule, Take 1 capsule by mouth Daily., Disp: , Rfl:     estradiol (Estrace) 0.5 MG tablet, Take 1 tablet by mouth Daily., Disp: 90 tablet, Rfl: 3    Multiple Vitamins-Minerals (PRESERVISION AREDS 2+MULTI VIT PO), Take 1 tablet by mouth Daily., Disp: , Rfl:     olmesartan (BENICAR) 40 MG tablet, TAKE ONE TABLET BY MOUTH ONCE NIGHTLY, Disp: 90 tablet, Rfl: 3    Progesterone (PROMETRIUM) 200 MG capsule, Take 1 capsule by mouth Daily., Disp: 90 capsule, Rfl: 3    Semaglutide-Weight Management (Wegovy) 0.5 MG/0.5ML solution auto-injector, Inject 0.5 mL under the skin into the appropriate area as directed Every 7 (Seven) Days., Disp: 2 mL, Rfl: 5    tiZANidine (Zanaflex) 4 MG tablet, Take 1 tablet by mouth At Night As Needed for Muscle Spasms., Disp: 30 tablet, Rfl: 5    celecoxib (CeleBREX) 200 MG capsule, Take 1 capsule by mouth Daily As Needed for Moderate Pain., Disp: 30 capsule, Rfl: 2        Diagnoses  and all orders for this visit:    1. Essential hypertension (Primary)    2. Overweight (BMI 25.0-29.9)  -     Semaglutide-Weight Management (Wegovy) 0.5 MG/0.5ML solution auto-injector; Inject 0.5 mL under the skin into the appropriate area as directed Every 7 (Seven) Days.  Dispense: 2 mL; Refill: 5    3. Routine general medical examination at a health care facility  -     CBC (No Diff)  -     Comprehensive Metabolic Panel  -     Lipid Panel  -     TSH  -     Vitamin B12  -     Urinalysis With Microscopic If Indicated (No Culture) - Urine, Clean Catch    4. Reactive depression    5. Multiple joint pain  -     celecoxib (CeleBREX) 200 MG capsule; Take 1 capsule by mouth Daily As Needed for Moderate Pain.  Dispense: 30 capsule; Refill: 2

## 2024-08-02 DIAGNOSIS — I10 ESSENTIAL HYPERTENSION: ICD-10-CM

## 2024-08-02 RX ORDER — OLMESARTAN MEDOXOMIL 40 MG/1
TABLET ORAL
Qty: 90 TABLET | Refills: 3 | Status: SHIPPED | OUTPATIENT
Start: 2024-08-02

## 2024-08-17 DIAGNOSIS — F32.9 REACTIVE DEPRESSION: ICD-10-CM

## 2024-08-19 RX ORDER — BUPROPION HYDROCHLORIDE 300 MG/1
300 TABLET ORAL EVERY MORNING
Qty: 90 TABLET | Refills: 3 | Status: SHIPPED | OUTPATIENT
Start: 2024-08-19

## 2024-09-06 DIAGNOSIS — E66.9 OBESITY WITH SERIOUS COMORBIDITY, UNSPECIFIED CLASSIFICATION, UNSPECIFIED OBESITY TYPE: ICD-10-CM

## 2024-09-06 RX ORDER — SEMAGLUTIDE 0.25 MG/.5ML
INJECTION, SOLUTION SUBCUTANEOUS
Qty: 2 ML | Refills: 2 | OUTPATIENT
Start: 2024-09-06

## 2024-09-30 ENCOUNTER — HOSPITAL ENCOUNTER (OUTPATIENT)
Dept: GENERAL RADIOLOGY | Facility: HOSPITAL | Age: 59
Discharge: HOME | End: 2024-09-30
Admitting: NURSE PRACTITIONER
Payer: COMMERCIAL

## 2024-09-30 ENCOUNTER — OFFICE VISIT (OUTPATIENT)
Dept: INTERNAL MEDICINE | Facility: CLINIC | Age: 59
End: 2024-09-30
Payer: COMMERCIAL

## 2024-09-30 VITALS
DIASTOLIC BLOOD PRESSURE: 80 MMHG | HEIGHT: 65 IN | HEART RATE: 69 BPM | TEMPERATURE: 97.6 F | WEIGHT: 149.2 LBS | SYSTOLIC BLOOD PRESSURE: 126 MMHG | BODY MASS INDEX: 24.86 KG/M2 | OXYGEN SATURATION: 98 %

## 2024-09-30 DIAGNOSIS — M25.561 PAIN OF RIGHT PATELLA: ICD-10-CM

## 2024-09-30 DIAGNOSIS — M25.561 ACUTE PAIN OF RIGHT KNEE: Primary | ICD-10-CM

## 2024-09-30 DIAGNOSIS — M25.561 ACUTE PAIN OF RIGHT KNEE: ICD-10-CM

## 2024-09-30 PROCEDURE — 99214 OFFICE O/P EST MOD 30 MIN: CPT | Performed by: NURSE PRACTITIONER

## 2024-09-30 PROCEDURE — 73562 X-RAY EXAM OF KNEE 3: CPT

## 2024-09-30 NOTE — PROGRESS NOTES
Subjective   Chief Complaint   Patient presents with    Knee Pain        Karthik Ibrahim is a 59 y.o. female.    History of Present Illness  The patient presents for evaluation of knee pain.    She has been experiencing knee pain for an extended period, describing it as a sensation akin to kneeling on shards of glass. The pain is primarily located on the exterior of her kneecap and is triggered by pressure. She suspects the presence of a small cyst but is uncertain if it has shifted as the discomfort has lessened.     She reports no recent trauma or accidents that could have caused the pain. The onset of the pain was several months ago, and it has since become a constant issue when applying pressure. Heavy bedding on her knee also causes discomfort. There is no swelling or discoloration in the area. The pain has prevented her from participating in her exercise class.    She is currently taking Celebrex for arthritis management.    I have reviewed the following portions of the patient's history and confirmed they are accurate: allergies, current medications, past family history, past medical history, past social history, past surgical history, and problem list    Review of Systems  Pertinent items are noted in HPI.     Current Outpatient Medications on File Prior to Visit   Medication Sig    buPROPion XL (WELLBUTRIN XL) 300 MG 24 hr tablet TAKE ONE TABLET BY MOUTH EVERY MORNING    celecoxib (CeleBREX) 200 MG capsule Take 1 capsule by mouth Daily As Needed for Moderate Pain.    dutasteride (AVODART) 0.5 MG capsule Take 1 capsule by mouth Daily.    estradiol (Estrace) 0.5 MG tablet Take 1 tablet by mouth Daily.    Multiple Vitamins-Minerals (PRESERVISION AREDS 2+MULTI VIT PO) Take 1 tablet by mouth Daily.    olmesartan (BENICAR) 40 MG tablet TAKE ONE TABLET BY MOUTH ONCE NIGHTLY    Progesterone (PROMETRIUM) 200 MG capsule Take 1 capsule by mouth Daily.    Semaglutide-Weight Management (Wegovy) 0.5 MG/0.5ML solution  "auto-injector Inject 0.5 mL under the skin into the appropriate area as directed Every 7 (Seven) Days.    tiZANidine (Zanaflex) 4 MG tablet Take 1 tablet by mouth At Night As Needed for Muscle Spasms.     No current facility-administered medications on file prior to visit.       Objective   Vitals:    09/30/24 0812   BP: 126/80   BP Location: Left arm   Patient Position: Sitting   Cuff Size: Adult   Pulse: 69   Temp: 97.6 °F (36.4 °C)   SpO2: 98%   Weight: 67.7 kg (149 lb 3.2 oz)   Height: 165.1 cm (65\")     Body mass index is 24.83 kg/m².    Physical Exam  Vitals reviewed.   Constitutional:       Appearance: Normal appearance. She is well-developed.   HENT:      Head: Normocephalic and atraumatic.      Nose: Nose normal.   Eyes:      General: Lids are normal.      Conjunctiva/sclera: Conjunctivae normal.      Pupils: Pupils are equal, round, and reactive to light.   Neck:      Thyroid: No thyromegaly.      Trachea: Trachea normal.   Pulmonary:      Effort: Pulmonary effort is normal. No respiratory distress.   Musculoskeletal:      Right knee: Tenderness present over the patellar tendon.   Skin:     General: Skin is warm and dry.   Neurological:      Mental Status: She is alert and oriented to person, place, and time.      GCS: GCS eye subscore is 4. GCS verbal subscore is 5. GCS motor subscore is 6.   Psychiatric:         Attention and Perception: Attention normal.         Mood and Affect: Mood normal.         Speech: Speech normal.         Behavior: Behavior normal. Behavior is cooperative.         Results  12/20/23 - DEXA bone density - normal bone mineral density results.     Assessment & Plan   Problem List Items Addressed This Visit    None  Visit Diagnoses       Acute pain of right knee    -  Primary    Relevant Orders    XR Knee 3 View Right    Pain of right patella        Relevant Orders    XR Knee 3 View Right               Current Outpatient Medications:     buPROPion XL (WELLBUTRIN XL) 300 MG 24 hr " tablet, TAKE ONE TABLET BY MOUTH EVERY MORNING, Disp: 90 tablet, Rfl: 3    celecoxib (CeleBREX) 200 MG capsule, Take 1 capsule by mouth Daily As Needed for Moderate Pain., Disp: 30 capsule, Rfl: 2    dutasteride (AVODART) 0.5 MG capsule, Take 1 capsule by mouth Daily., Disp: , Rfl:     estradiol (Estrace) 0.5 MG tablet, Take 1 tablet by mouth Daily., Disp: 90 tablet, Rfl: 3    Multiple Vitamins-Minerals (PRESERVISION AREDS 2+MULTI VIT PO), Take 1 tablet by mouth Daily., Disp: , Rfl:     olmesartan (BENICAR) 40 MG tablet, TAKE ONE TABLET BY MOUTH ONCE NIGHTLY, Disp: 90 tablet, Rfl: 3    Progesterone (PROMETRIUM) 200 MG capsule, Take 1 capsule by mouth Daily., Disp: 90 capsule, Rfl: 3    Semaglutide-Weight Management (Wegovy) 0.5 MG/0.5ML solution auto-injector, Inject 0.5 mL under the skin into the appropriate area as directed Every 7 (Seven) Days., Disp: 2 mL, Rfl: 5    tiZANidine (Zanaflex) 4 MG tablet, Take 1 tablet by mouth At Night As Needed for Muscle Spasms., Disp: 30 tablet, Rfl: 5    Assessment & Plan  Right knee x-ray ordered.  Will refer to orthopedics based on results.  Continue Celebrex as needed for discomfort.  Consider referral to physical therapy and MRI.           Plan of care reviewed with the patient at the conclusion of today's visit.  Education was provided regarding diagnosis, management, and any prescribed or recommended OTC medications.  Patient verbalized understanding of and agreement with management plan.     Return if symptoms worsen or fail to improve.      Transcribed from ambient dictation for ALMA Arvizu by ALMA Arvizu.  09/30/24   08:19 EDT    Patient or patient representative verbalized consent for the use of Ambient Listening during the visit with  ALMA Arvizu for chart documentation. 9/30/2024  08:36 EDT

## 2024-10-03 DIAGNOSIS — M25.461 EFFUSION OF RIGHT KNEE: ICD-10-CM

## 2024-10-03 DIAGNOSIS — M25.561 PAIN OF RIGHT PATELLA: ICD-10-CM

## 2024-10-03 DIAGNOSIS — M25.561 ACUTE PAIN OF RIGHT KNEE: Primary | ICD-10-CM

## 2024-10-10 ENCOUNTER — OFFICE VISIT (OUTPATIENT)
Dept: ORTHOPEDIC SURGERY | Facility: CLINIC | Age: 59
End: 2024-10-10
Payer: COMMERCIAL

## 2024-10-10 VITALS
DIASTOLIC BLOOD PRESSURE: 80 MMHG | BODY MASS INDEX: 23.99 KG/M2 | WEIGHT: 144 LBS | SYSTOLIC BLOOD PRESSURE: 110 MMHG | HEIGHT: 65 IN

## 2024-10-10 DIAGNOSIS — M70.41 PREPATELLAR BURSITIS, RIGHT KNEE: ICD-10-CM

## 2024-10-10 DIAGNOSIS — M17.11 PRIMARY OSTEOARTHRITIS OF RIGHT KNEE: Primary | ICD-10-CM

## 2024-10-10 PROCEDURE — 99204 OFFICE O/P NEW MOD 45 MIN: CPT | Performed by: ORTHOPAEDIC SURGERY

## 2024-10-10 NOTE — PROGRESS NOTES
Orthopaedic Clinic Note: Knee New Patient    Chief Complaint   Patient presents with    Right Knee - Pain        HPI    Karthik Ibrahim is a 59 y.o. female who presents with right knee pain for 2 month(s). Onset atraumatic and gradual in nature. Pain is localized to the patella and is a 6/10 on the pain scale. Pain is described as burning. Associated symptoms include pain. The pain is worse with  putting any pressure on the knee ; resting make it better. Previous treatments have included: nothing since symptom onset. Although some transient relief was reported with these interventions, these conservative measures have failed and symptoms have persisted. The patient is limited in daily activities and has had a significant decrease in quality of life as a result. She denies fevers, chills, or constitutional symptoms.    I have reviewed the following portions of the patient's history:History of Present Illness    Past Medical History:   Diagnosis Date    Family Hx of osteoporosis MGM/osteopenia mother/patient normal DEXA age 51 02/15/2018    Headache     Hyperplastic colon polyp 2011 02/15/2018    Hypertension     Routine general medical examination at a health care facility 07/22/2020      Past Surgical History:   Procedure Laterality Date    CHOLECYSTECTOMY      COLONOSCOPY W/ BIOPSIES  2011 and 2014    Hyperplastic 2014    SKIN CANCER EXCISION      facial moles removed    TUBAL ABDOMINAL LIGATION      WISDOM TOOTH EXTRACTION        Family History   Problem Relation Age of Onset    Osteoporosis Mother     Hepatitis Father     Lung cancer Father 57        smoker    Ovarian cancer Maternal Grandmother 70    Osteoporosis Maternal Grandmother     Kidney disease Maternal Grandmother     Skin cancer Maternal Grandmother     Colon cancer Maternal Aunt     Heart disease Maternal Grandfather     Colon cancer Paternal Grandfather     BRCA 1/2 Neg Hx     Endometrial cancer Neg Hx     Breast cancer Neg Hx      Social History      Socioeconomic History    Marital status:     Number of children: 4    Highest education level: Some college, no degree   Tobacco Use    Smoking status: Former     Current packs/day: 0.00     Average packs/day: 0.5 packs/day for 13.0 years (6.5 ttl pk-yrs)     Types: Cigarettes     Start date:      Quit date:      Years since quittin.7    Smokeless tobacco: Never   Vaping Use    Vaping status: Never Used   Substance and Sexual Activity    Alcohol use: Yes     Alcohol/week: 14.0 standard drinks of alcohol     Types: 14 Glasses of wine per week     Comment: suggest 10 per week max    Drug use: No    Sexual activity: Yes     Partners: Male     Birth control/protection: Surgical, Tubal ligation      Current Outpatient Medications on File Prior to Visit   Medication Sig Dispense Refill    buPROPion XL (WELLBUTRIN XL) 300 MG 24 hr tablet TAKE ONE TABLET BY MOUTH EVERY MORNING 90 tablet 3    celecoxib (CeleBREX) 200 MG capsule Take 1 capsule by mouth Daily As Needed for Moderate Pain. 30 capsule 2    dutasteride (AVODART) 0.5 MG capsule Take 1 capsule by mouth Daily.      estradiol (Estrace) 0.5 MG tablet Take 1 tablet by mouth Daily. 90 tablet 3    Multiple Vitamins-Minerals (PRESERVISION AREDS 2+MULTI VIT PO) Take 1 tablet by mouth Daily.      olmesartan (BENICAR) 40 MG tablet TAKE ONE TABLET BY MOUTH ONCE NIGHTLY 90 tablet 3    Progesterone (PROMETRIUM) 200 MG capsule Take 1 capsule by mouth Daily. 90 capsule 3    Semaglutide-Weight Management (Wegovy) 0.5 MG/0.5ML solution auto-injector Inject 0.5 mL under the skin into the appropriate area as directed Every 7 (Seven) Days. 2 mL 5    tiZANidine (Zanaflex) 4 MG tablet Take 1 tablet by mouth At Night As Needed for Muscle Spasms. 30 tablet 5     No current facility-administered medications on file prior to visit.      No Known Allergies     Review of Systems   Constitutional: Negative.    HENT: Negative.     Eyes: Negative.    Respiratory: Negative.  "    Cardiovascular: Negative.    Gastrointestinal: Negative.    Endocrine: Negative.    Genitourinary: Negative.    Musculoskeletal:  Positive for arthralgias.   Skin: Negative.    Allergic/Immunologic: Negative.    Neurological: Negative.    Hematological: Negative.    Psychiatric/Behavioral: Negative.          The patient's Review of Systems was personally reviewed and confirmed as accurate.    The following portions of the patient's history were reviewed and updated as appropriate: allergies, current medications, past family history, past medical history, past social history, past surgical history, and problem list.    Physical Exam  Blood pressure 110/80, height 165.1 cm (65\"), weight 65.3 kg (144 lb), last menstrual period 11/13/2021, not currently breastfeeding.    Body mass index is 23.96 kg/m².    GENERAL APPEARANCE: awake, alert & oriented x 3, in no acute distress and well developed, well nourished  PSYCH: normal affect  LUNGS:  breathing nonlabored  EYES: sclera anicteric  CARDIOVASCULAR: palpable dorsalis pedis, palpable posterior tibial bilaterally. Capillary refill less than 2 seconds  EXTREMITIES: no clubbing, cyanosis  GAIT:  Normal            Right Lower Extremity Exam:   ----------  Hip Exam  ----------  FLEXION CONTRACTURE: None  FLEXION: 110 degrees  INTERNAL ROTATION: 20 degrees at 90 degrees of flexion   EXTERNAL ROTATION: 40 degrees at 90 degrees of flexion    PAIN WITH HIP MOTION: no  ----------  Knee Exam  ----------  ALIGNMENT: neutral, no varus or valgus deformity     RANGE OF MOTION:  Normal (0-120 degrees) with no extensor lag or flexion contracture  LIGAMENTOUS STABILITY:   stable to varus and valgus stress at terminal extension and 30 degrees without any evidence of laxity     STRENGTH:  5/5 knee flexion, extension. 5/5 ankle dorsiflexion and plantarflexion.     PAIN WITH PALPATION: denies tenderness to palpation about the knee, denies medial or lateral joint line pain  KNEE EFFUSION: " no  PAIN WITH KNEE ROM: no  PATELLAR CREPITUS: no  SPECIAL EXAM FINDINGS:  Negative patellar compression    REFLEXES:  PATELLAR 2+/4  ACHILLES 2+/4    CLONUS: negative  STRAIGHT LEG TEST:   negative    SENSATION TO LIGHT TOUCH:  DEEP PERONEAL/SUPERFICIAL PERONEAL/SURAL/SAPHENOUS/TIBIAL:   intact    EDEMA:  no  ERYTHEMA:  no  WOUNDS/INCISIONS: no overlying skin problems.    ______________________________________________________________________  ______________________________________________________________________    RADIOGRAPHIC FINDINGS:   Right knee x-rays from 9/30/2024 were personally reviewed and interpreted.  Imaging reveals mild tricompartmental arthritic changes with no acute bony injury or fracture.  No significant swelling or overlying skin changes identified.    Assessment/Plan:   Diagnosis Plan   1. Primary osteoarthritis of right knee        2. Prepatellar bursitis, right knee          I reassured the patient that I see nothing worrisome in regards to her knee imaging.  She does have mild arthritic changes of the knee which are age-appropriate.  I certainly do not see anything on physical exam today.  It sounds like she had some prepatellar bursal inflammation that has now gradually improved.  I recommend conservative treatment with no direct pressure to the area.  Anti-inflammatories and ice are recommended as needed.  Follow-up as needed.    Yury Mo MD  10/10/24  10:21 EDT

## 2024-10-20 DIAGNOSIS — M25.50 MULTIPLE JOINT PAIN: ICD-10-CM

## 2024-10-21 RX ORDER — CELECOXIB 200 MG/1
200 CAPSULE ORAL DAILY PRN
Qty: 30 CAPSULE | Refills: 2 | Status: SHIPPED | OUTPATIENT
Start: 2024-10-21

## 2024-10-28 NOTE — PROGRESS NOTES
Subjective   Chief Complaint   Patient presents with    Annual Exam     Well woman exam   Karthik Ibrahim is a 59 y.o. year old  presenting to be seen for her annual exam.   Her last Pap was 2023 with results and normal cytology and negative HPV cotesting.  She did have endometrial cells noted on her Pap at that time.  She had endometrial biopsy November of last year which was normal.  Plan was to repeat Pap in 12 months.  She had a BI-RADS 1 mammogram 2023.  She had a normal bone density performed 2023.  Her last colonoscopy was in 2018. She tried to wean off estrace last year and had headaches, these resolved with restarting estrogen. She has history of menstrual migraines. She has questions about weaning off hormones.     There has not been vaginal bleeding in the last 12 months.  Menopausal symptoms are not present.    SEXUAL Hx:  She is currently sexually active.  In the past year there there has been NO new sexual partners.    Condoms are never used.  She would not like to be screened for STD's at today's exam.  [place] is painful: no  HEALTH Hx:  She exercises regularly: yes.  She wears her seat belt: yes.  She has concerns about domestic violence: no.  She has noticed changes in height: no.  OTHER THINGS SHE WANTS TO DISCUSS TODAY:  Nothing else    The following portions of the patient's history were reviewed and updated as appropriate:problem list, current medications, allergies, past family history, past medical history, past social history, and past surgical history.    Social History    Tobacco Use      Smoking status: Former        Packs/day: 0.00        Years: 0.5 packs/day for 13.0 years (6.5 ttl pk-yrs)        Types: Cigarettes        Start date:         Quit date: 2010        Years since quittin.8      Smokeless tobacco: Never      Review of Systems  Constitutional POS: nothing reported    NEG: anorexia or night sweats   Genitourinary POS: nothing  "reported    NEG: dysuria or hematuria      Gastointestinal POS: constipation (chronic)    NEG: bloating, change in bowel habits, melena, or reflux symptoms   Integument POS: nothing reported    NEG: moles that are changing in size, shape, color or rashes   Breast POS: nothing reported    NEG: persistent breast lump, skin dimpling, or nipple discharge        Objective   /70 (BP Location: Left arm, Patient Position: Sitting, Cuff Size: Adult)   Ht 165.1 cm (65\")   Wt 64.9 kg (143 lb)   LMP 11/13/2021   BMI 23.80 kg/m²     General:  well developed; well nourished  no acute distress  mentation appropriate   Skin:  No suspicious lesions seen   Thyroid: normal to inspection and palpation   Breasts:  Examined in supine position  Symmetric without masses or skin dimpling  Nipples normal without inversion, lesions or discharge  There are no palpable axillary nodes   Abdomen: soft, non-tender; no masses  no umbilical or inguinal hernias are present  no hepato-splenomegaly   Pelvis: Clinical staff was present for exam  External genitalia:  normal appearance of the external genitalia including Bartholin's and Walsh's glands.  :  urethral meatus normal;  Vaginal:  normal pink mucosa without prolapse or lesions.  Cervix:  normal appearance. friable;  Uterus:  normal size, shape and consistency.  Adnexa:  non palpable bilaterally.  Rectal:  digital rectal exam not performed; anus visually normal appearing.        Assessment   Well woman with routine gynecological exam  Postmenopasual on HRT without vasomotor symptoms of menopause   She is up to date on all relevant gynecologic and colorectal screenings       Plan   Pap was done today.  If she does not receive the results of the Pap within 2 weeks  time, she was instructed to call to find out the results.  I explained to Charing that the recommendations for Pap smear interval in a low risk patient's has lengthened to 3 years time.  I encouraged her to be seen yearly for " a full physical exam including breast and pelvic exam even during the off years when PAP's will not be performed.  She was encouraged to get yearly mammograms.  She should report any palpable breast lump(s) or skin changes regardless of mammographic findings.  I explained to Charing that notification regarding her mammogram results will come from the center performing the study.  Our office will not be routinely calling with mammogram results.  It is her responsibility to make sure that the results from the mammogram are communicated to her by the breast center.  If she has any questions about the results, she is welcome to call our office anytime.  Discussed risks/benefits of continuing HRT, she would like to wean off if her headaches do not reoccur, discussed decreasing estrogen to every other day and if doing well decrease to a couple times a week then discontinue. Will continue progesterone while using estrogen therapy. If she does stop oral estrogen and has symptoms of vaginal atrophy will send in prescription for estrace cream   The importance of keeping all planned follow-up and taking all medications as prescribed was emphasized.  Today I discussed with Charing the total recommended calcium intake for a post-menopausal female is 1200 mg.  Ideally this should be from dietary sources.  I reviewed calcium content in various foods including milk, fortified orange juice and yogurt.  If she cannot get sufficient calcium through dietary means, it is recommended to supplement with either a multivitamin or calcium to reach her daily goal.  I also reviewed the difference in the bioavailability of calcium carbonate and calcium citrate containing supplements and the importance of taking calcium carbonate containing products with food. Finally, vitamin D's role in calcium absorption was reviewed and a total daily vitamin D intake of 600 units was recommended.  Her vaccine record was reviewed and updated.  Follow up for  annual exam 1 year    No orders of the defined types were placed in this encounter.         This note was electronically signed.  Lina Whyte, ALMA  October 29, 2024

## 2024-10-29 ENCOUNTER — OFFICE VISIT (OUTPATIENT)
Dept: OBSTETRICS AND GYNECOLOGY | Facility: CLINIC | Age: 59
End: 2024-10-29
Payer: COMMERCIAL

## 2024-10-29 ENCOUNTER — TRANSCRIBE ORDERS (OUTPATIENT)
Dept: ADMINISTRATIVE | Facility: HOSPITAL | Age: 59
End: 2024-10-29
Payer: COMMERCIAL

## 2024-10-29 VITALS
HEIGHT: 65 IN | DIASTOLIC BLOOD PRESSURE: 70 MMHG | WEIGHT: 143 LBS | SYSTOLIC BLOOD PRESSURE: 100 MMHG | BODY MASS INDEX: 23.82 KG/M2

## 2024-10-29 DIAGNOSIS — N95.1 MENOPAUSAL SYMPTOMS: ICD-10-CM

## 2024-10-29 DIAGNOSIS — Z12.31 VISIT FOR SCREENING MAMMOGRAM: Primary | ICD-10-CM

## 2024-10-29 DIAGNOSIS — Z01.419 WELL WOMAN EXAM WITH ROUTINE GYNECOLOGICAL EXAM: Primary | ICD-10-CM

## 2024-10-29 PROCEDURE — 99396 PREV VISIT EST AGE 40-64: CPT | Performed by: NURSE PRACTITIONER

## 2024-10-29 PROCEDURE — 99459 PELVIC EXAMINATION: CPT | Performed by: NURSE PRACTITIONER

## 2024-10-29 RX ORDER — PROGESTERONE 200 MG/1
200 CAPSULE ORAL DAILY
Qty: 90 CAPSULE | Refills: 3 | Status: SHIPPED | OUTPATIENT
Start: 2024-10-29

## 2024-10-29 RX ORDER — ESTRADIOL 0.5 MG/1
0.5 TABLET ORAL DAILY
Qty: 90 TABLET | Refills: 3 | Status: SHIPPED | OUTPATIENT
Start: 2024-10-29

## 2024-10-30 LAB — REF LAB TEST METHOD: NORMAL

## 2024-11-20 ENCOUNTER — HOSPITAL ENCOUNTER (EMERGENCY)
Facility: HOSPITAL | Age: 59
Discharge: HOME | End: 2024-11-20
Attending: EMERGENCY MEDICINE | Admitting: EMERGENCY MEDICINE
Payer: COMMERCIAL

## 2024-11-20 ENCOUNTER — APPOINTMENT (OUTPATIENT)
Dept: GENERAL RADIOLOGY | Facility: HOSPITAL | Age: 59
End: 2024-11-20
Payer: COMMERCIAL

## 2024-11-20 VITALS
SYSTOLIC BLOOD PRESSURE: 138 MMHG | DIASTOLIC BLOOD PRESSURE: 72 MMHG | HEIGHT: 66 IN | HEART RATE: 61 BPM | WEIGHT: 145 LBS | BODY MASS INDEX: 23.3 KG/M2 | RESPIRATION RATE: 18 BRPM | TEMPERATURE: 97.9 F | OXYGEN SATURATION: 99 %

## 2024-11-20 DIAGNOSIS — S92.524A CLOSED NONDISPLACED FRACTURE OF MIDDLE PHALANX OF LESSER TOE OF RIGHT FOOT, INITIAL ENCOUNTER: Primary | ICD-10-CM

## 2024-11-20 DIAGNOSIS — M79.671 RIGHT FOOT PAIN: ICD-10-CM

## 2024-11-20 PROCEDURE — 73630 X-RAY EXAM OF FOOT: CPT

## 2024-11-20 PROCEDURE — 99283 EMERGENCY DEPT VISIT LOW MDM: CPT

## 2024-11-20 RX ORDER — TRAMADOL HYDROCHLORIDE 50 MG/1
50 TABLET ORAL EVERY 6 HOURS PRN
Qty: 8 TABLET | Refills: 0 | Status: SHIPPED | OUTPATIENT
Start: 2024-11-20

## 2024-11-20 NOTE — ED PROVIDER NOTES
Subjective   History of Present Illness  This is a pleasant 59-year-old female who is a  by profession she is accompanied by her mother.  She is a non-smoker.    She presents the emergency department today left foot trauma.  This happened yesterday evening when she bumped her left second toe against a dog gate.  Had immediate pain in the toe was deformed it was pushed over.  She marge taped it to her other toe and now she notices discoloration of her foot and difficulty bearing weight at all on her foot especially the ball.  There is no radiation of pain but her foot does look different in color to her and she is got moderate swelling of the forefoot.    She reports no previous toe injuries on this foot.  No change in medication.  She is recently lost a lot of weight on medication and on a walking regimen.    All other systems are reviewed and negative except as noted above.        Review of Systems   All other systems reviewed and are negative.      Past Medical History:   Diagnosis Date    Family Hx of osteoporosis MGM/osteopenia mother/patient normal DEXA age 51 02/15/2018    Headache     Hyperplastic colon polyp 2011 02/15/2018    Hypertension     Routine general medical examination at a health care facility 07/22/2020       No Known Allergies    Past Surgical History:   Procedure Laterality Date    CHOLECYSTECTOMY      COLONOSCOPY W/ BIOPSIES  2011 and 2014    Hyperplastic 2014    SKIN CANCER EXCISION      facial moles removed    TUBAL ABDOMINAL LIGATION      WISDOM TOOTH EXTRACTION         Family History   Problem Relation Age of Onset    Osteoporosis Mother     Hepatitis Father     Lung cancer Father 57        smoker    Ovarian cancer Maternal Grandmother 70    Osteoporosis Maternal Grandmother     Kidney disease Maternal Grandmother     Skin cancer Maternal Grandmother     Colon cancer Maternal Aunt     Heart disease Maternal Grandfather     Colon cancer Paternal Grandfather     BRCA 1/2 Neg Hx      Endometrial cancer Neg Hx     Breast cancer Neg Hx        Social History     Socioeconomic History    Marital status:     Number of children: 4    Highest education level: Some college, no degree   Tobacco Use    Smoking status: Former     Current packs/day: 0.00     Average packs/day: 0.5 packs/day for 13.0 years (6.5 ttl pk-yrs)     Types: Cigarettes     Start date:      Quit date:      Years since quittin.8    Smokeless tobacco: Never   Vaping Use    Vaping status: Never Used   Substance and Sexual Activity    Alcohol use: Yes     Alcohol/week: 14.0 standard drinks of alcohol     Types: 14 Glasses of wine per week     Comment: suggest 10 per week max    Drug use: No    Sexual activity: Yes     Partners: Male     Birth control/protection: Surgical, Tubal ligation           Objective   Physical Exam  Vitals and nursing note reviewed.   Constitutional:       Appearance: She is normal weight.      Comments: This is a pleasant 59-year-old alert and oriented GCS 15 she is in no distress.   HENT:      Head: Normocephalic and atraumatic.   Eyes:      Extraocular Movements: Extraocular movements intact.      Pupils: Pupils are equal, round, and reactive to light.   Pulmonary:      Effort: Pulmonary effort is normal.   Musculoskeletal:      Comments: Left knee nontender left calf nontender left ankle nontender.  Left foot mildly swollen especially the forefoot left second toe there is a little bit of darkening of the skin compared with the right foot.  Her toenails are partially painted she has good capillary refill less than 2 seconds in all her toe tips sensation to touch is intact in all her toe tips but slightly decreased in the second and first toe and on the dorsum of her foot.  4/4 pulses in the left foot and right foot.  She is tender to palpation in the ball of her foot especially over the second MTP.  Range of motion of the second toe is limited secondary to pain she has mild swelling.  No  "lacerations noted.   Skin:     Capillary Refill: Capillary refill takes less than 2 seconds.   Neurological:      Mental Status: She is alert.      Comments: Face symmetric, voice strong, tongue midline.  Vision, hearing, speech preserved.  No focal weakness.         Splint - Cast - Strapping    Date/Time: 11/20/2024 6:34 PM    Performed by: Jimenez Lim MD  Authorized by: Jimenez Lim MD    Consent:     Consent obtained:  Verbal  Comments:      Gildardo taping left great and second toe.  Permission verbal.    Small amount of cast padding placed between the toes and Coban used to wrap the toes.  Patient neurovascular intact post procedure I showed her how to do this at home and gave her the equipment to do it with at home.  No immediate complications.             ED Course        No results found for this or any previous visit (from the past 24 hours).  Note: In addition to lab results from this visit, the labs listed above may include labs taken at another facility or during a different encounter within the last 24 hours. Please correlate lab times with ED admission and discharge times for further clarification of the services performed during this visit.    XR Foot 3+ View Left   Final Result   Impression:      1. Oblique midshaft fracture involving the second proximal phalanx with soft tissue swelling.         Electronically Signed: Kirstin Lindquist MD     11/20/2024 11:10 AM EST     Workstation ID: [id number]        Vitals:    11/20/24 0937 11/20/24 1153   BP: 140/71 138/72   BP Location: Left arm    Patient Position: Sitting    Pulse: 63 61   Resp: 18    Temp: 97.9 °F (36.6 °C)    TempSrc: Oral    SpO2: 97% 99%   Weight: 65.8 kg (145 lb)    Height: 167.6 cm (66\")      Medications - No data to display  ECG/EMG Results (last 24 hours)       ** No results found for the last 24 hours. **          No orders to display                                            JESSIE reviewed by Jimenez Lim MD   "     Medical Decision Making      I reviewed all available studies at the bedside with the patient and her mother.  Her x-ray shows a fracture of the phalanx second toe.  I do not see any tarsal fractures or metatarsal fractures.    I suspect in addition of the toe fracture she probably is contused her metatarsals.  She has some skin discoloration but the foot is well-perfused I worry a little bit she may have some predisposition for a causalgia for this foot and review of literature suggest vitamin C can decrease the risk of this happening side of asked her to take a vitamin C supplement 3 times a day.    She will take Tylenol Motrin of hurting a little bit and tramadol if hurting worse.    We put her in an elastic bandage here in a walking boot.  She will weight-bear as tolerated.  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    I marge taped her toe and instructed how to do it at home and give her supplies to do this.    She will return to the emergency department if worse in any way.    All are agreeable with the plan    Problems Addressed:  Closed nondisplaced fracture of middle phalanx of lesser toe of right foot, initial encounter: complicated acute illness or injury  Right foot pain: complicated acute illness or injury    Amount and/or Complexity of Data Reviewed  External Data Reviewed: notes.  Radiology: ordered and independent interpretation performed. Decision-making details documented in ED Course.    Risk  Prescription drug management.        Final diagnoses:   Closed nondisplaced fracture of middle phalanx of lesser toe of right foot, initial encounter   Right foot pain       ED Disposition  ED Disposition       ED Disposition   Discharge    Condition   --    Comment   --               Abram Adams MD  [address]  [place] [id number] [phone]    Schedule an appointment as soon as possible for a visit   For follow-up of your toe fracture and return to work    Jimenez Fong MD  [address] [id number] [phone]      As needed, If symptoms worsen         Medication List        New Prescriptions      traMADol 50 MG tablet  Commonly known as: ULTRAM  Take 1 tablet by mouth Every 6 (Six) Hours As Needed for Moderate Pain for up to 8 doses.               Where to Get Your Medications        These medications were sent to [facility] [id number] - [place], [place] - [address] - [phone]  - [phone]   [address] [address]      Phone: [phone]   traMADol 50 MG tablet            Jimenez Lim MD  11/20/24 [id number]

## 2024-11-20 NOTE — Clinical Note
[facility] EMERGENCY DEPARTMENT  [address]  [facility] [address]  Phone: [phone]    Karthik Ibrahim was seen and treated in our emergency department on 11/20/2024.  She may return to work on 11/27/2024.         Thank you for choosing [facility].    Jimenez Lim MD

## 2024-11-20 NOTE — DISCHARGE INSTRUCTIONS
Weightbearing as tolerated, Tylenol Motrin if you are hurting a little bit the other pain medicine if you are hurting worse but no drive or attempt operate equipment.  Can use ice pack on that foot for 20 minutes 4 times a day.  Gildardo tape your toes.  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  He should go ahead and take vitamin C 1 pill 3 times a day for the next month or so to help prevent reflex sympathetic dystrophy

## 2024-11-20 NOTE — Clinical Note
[facility] EMERGENCY DEPARTMENT  [address]  [facility] [address]  Phone: [phone]    Karthik Ibrahim was seen and treated in our emergency department on 11/20/2024.  She may return to work on 12/03/2024.         Thank you for choosing [facility].    Jmienez Lim MD

## 2024-11-21 ENCOUNTER — TELEPHONE (OUTPATIENT)
Dept: INTERNAL MEDICINE | Facility: CLINIC | Age: 59
End: 2024-11-21
Payer: COMMERCIAL

## 2024-11-21 NOTE — TELEPHONE ENCOUNTER
Patient was transferred to office by [facility] to schedule an ER follow up.     ER FU-  OSVALDO - 11/20 - TOE FRACTURE - GIVEN NOTE BY ER TO RETURN TO WORK 12/03    I offered patient an appointment on 11/27 with  and patient declined - I offered patient next available with  of 12/17 - patient declined this appointment also and stated she would find a new doctor and disconnected.

## 2024-12-03 ENCOUNTER — OFFICE VISIT (OUTPATIENT)
Dept: INTERNAL MEDICINE | Facility: CLINIC | Age: 59
End: 2024-12-03
Payer: COMMERCIAL

## 2024-12-03 ENCOUNTER — HOSPITAL ENCOUNTER (OUTPATIENT)
Dept: GENERAL RADIOLOGY | Facility: HOSPITAL | Age: 59
Discharge: HOME | End: 2024-12-03
Admitting: INTERNAL MEDICINE
Payer: COMMERCIAL

## 2024-12-03 ENCOUNTER — TELEPHONE (OUTPATIENT)
Dept: INTERNAL MEDICINE | Facility: CLINIC | Age: 59
End: 2024-12-03

## 2024-12-03 VITALS
BODY MASS INDEX: 22.98 KG/M2 | SYSTOLIC BLOOD PRESSURE: 120 MMHG | OXYGEN SATURATION: 100 % | HEART RATE: 56 BPM | DIASTOLIC BLOOD PRESSURE: 80 MMHG | HEIGHT: 66 IN | WEIGHT: 143 LBS

## 2024-12-03 DIAGNOSIS — F32.9 REACTIVE DEPRESSION: ICD-10-CM

## 2024-12-03 DIAGNOSIS — S92.912D CLOSED NONDISPLACED FRACTURE OF PHALANX OF TOE OF LEFT FOOT WITH ROUTINE HEALING, UNSPECIFIED TOE, SUBSEQUENT ENCOUNTER: ICD-10-CM

## 2024-12-03 DIAGNOSIS — I10 ESSENTIAL HYPERTENSION: Primary | ICD-10-CM

## 2024-12-03 PROCEDURE — 73630 X-RAY EXAM OF FOOT: CPT

## 2024-12-03 PROCEDURE — 99214 OFFICE O/P EST MOD 30 MIN: CPT | Performed by: INTERNAL MEDICINE

## 2024-12-03 NOTE — TELEPHONE ENCOUNTER
Called the pt and let her know I got her scheduled for 12/10/24 with [facility] podiatry, pt has been seen by  back in 2017. Pt said the appt was fine, but wanted to know if her letter to be off of work could be extended until 12/11/24? Please advise

## 2024-12-03 NOTE — PROGRESS NOTES
"Patient is a 59 y.o. female who is here for a follow up of recent ER visit  Chief Complaint   Patient presents with    Follow-up         HPI:    Here for mgmt of left 2 toe fracture.  Onset 11/20 after injury against dog fence.  Patient has buddy taped and use of \"crocks\".  Not using walking boot.  Pain with ambulation.   Her gait is affected.      History:     Patient Active Problem List   Diagnosis    Menstrual migraine without status migrainosus, not intractable    Abnormal liver enzymes    Routine general medical examination at a health care facility    Essential hypertension    Dizziness    Reactive depression    Multiple joint pain    Neck pain    Overweight (BMI 25.0-29.9)    Normal gynecologic examination    Closed nondisplaced fracture of phalanx of toe of left foot with routine healing       Past Medical History:   Diagnosis Date    Family Hx of osteoporosis MGM/osteopenia mother/patient normal DEXA age 51 02/15/2018    Headache     Hyperplastic colon polyp 2011 02/15/2018    Hypertension     Routine general medical examination at a health care facility 07/22/2020       Past Surgical History:   Procedure Laterality Date    CHOLECYSTECTOMY      COLONOSCOPY W/ BIOPSIES  2011 and 2014    Hyperplastic 2014    SKIN CANCER EXCISION      facial moles removed    TUBAL ABDOMINAL LIGATION      WISDOM TOOTH EXTRACTION         Current Outpatient Medications on File Prior to Visit   Medication Sig    buPROPion XL (WELLBUTRIN XL) 300 MG 24 hr tablet TAKE ONE TABLET BY MOUTH EVERY MORNING    celecoxib (CeleBREX) 200 MG capsule TAKE 1 CAPSULE BY MOUTH DAILY AS NEEDED FOR MODERATE PAIN    dutasteride (AVODART) 0.5 MG capsule Take 1 capsule by mouth Daily.    estradiol (Estrace) 0.5 MG tablet Take 1 tablet by mouth Daily.    Multiple Vitamins-Minerals (PRESERVISION AREDS 2+MULTI VIT PO) Take 1 tablet by mouth Daily.    olmesartan (BENICAR) 40 MG tablet TAKE ONE TABLET BY MOUTH ONCE NIGHTLY    Progesterone (PROMETRIUM) 200 " MG capsule Take 1 capsule by mouth Daily.    tiZANidine (Zanaflex) 4 MG tablet Take 1 tablet by mouth At Night As Needed for Muscle Spasms.    traMADol (ULTRAM) 50 MG tablet Take 1 tablet by mouth Every 6 (Six) Hours As Needed for Moderate Pain for up to 8 doses.    Semaglutide-Weight Management (Wegovy) 0.5 MG/0.5ML solution auto-injector Inject 0.5 mL under the skin into the appropriate area as directed Every 7 (Seven) Days.     No current facility-administered medications on file prior to visit.       Family History   Problem Relation Age of Onset    Osteoporosis Mother     Hepatitis Father     Lung cancer Father 57        smoker    Ovarian cancer Maternal Grandmother 70    Osteoporosis Maternal Grandmother     Kidney disease Maternal Grandmother     Skin cancer Maternal Grandmother     Colon cancer Maternal Aunt     Heart disease Maternal Grandfather     Colon cancer Paternal Grandfather     BRCA 1/2 Neg Hx     Endometrial cancer Neg Hx     Breast cancer Neg Hx        Social History     Socioeconomic History    Marital status:     Number of children: 4    Highest education level: Some college, no degree   Tobacco Use    Smoking status: Former     Current packs/day: 0.00     Average packs/day: 0.5 packs/day for 13.0 years (6.5 ttl pk-yrs)     Types: Cigarettes     Start date:      Quit date:      Years since quittin.9    Smokeless tobacco: Never   Vaping Use    Vaping status: Never Used   Substance and Sexual Activity    Alcohol use: Yes     Alcohol/week: 14.0 standard drinks of alcohol     Types: 14 Glasses of wine per week     Comment: suggest 10 per week max    Drug use: No    Sexual activity: Yes     Partners: Male     Birth control/protection: Surgical, Tubal ligation         Review of Systems   Constitutional:  Negative for chills and fever.   HENT:  Negative for congestion, ear pain, hearing loss, rhinorrhea, sinus pressure, sore throat and trouble swallowing.    Eyes:  Negative for  "discharge and itching.   Respiratory:  Negative for cough, chest tightness and shortness of breath.    Cardiovascular:  Negative for chest pain, palpitations and leg swelling.   Gastrointestinal:  Negative for abdominal pain, blood in stool, constipation, diarrhea and vomiting.        2018 colonoscopy, repeat in 10 years   Endocrine: Negative for polydipsia and polyuria.   Genitourinary:  Negative for difficulty urinating, dysuria, enuresis, frequency, hematuria and urgency.        12/23 mammogram  Menses in 8/20   Musculoskeletal:  Positive for arthralgias. Negative for gait problem and joint swelling.        See HPI   Skin:  Negative for rash and wound.   Allergic/Immunologic: Negative for immunocompromised state.   Neurological:  Negative for dizziness, syncope, weakness, light-headedness and numbness.   Hematological:  Does not bruise/bleed easily.   Psychiatric/Behavioral:  Positive for sleep disturbance. Negative for behavioral problems and dysphoric mood. The patient is not nervous/anxious.        /80   Pulse 56   Ht 167.6 cm (65.98\")   Wt 64.9 kg (143 lb)   LMP 11/13/2021   SpO2 100%   BMI 23.09 kg/m²       Physical Exam  Constitutional:       Appearance: Normal appearance. She is well-developed. She is obese.   HENT:      Head: Normocephalic and atraumatic.      Right Ear: External ear normal.      Left Ear: External ear normal.      Nose: Nose normal.      Mouth/Throat:      Mouth: Mucous membranes are moist.      Pharynx: Oropharynx is clear.   Eyes:      Extraocular Movements: Extraocular movements intact.      Conjunctiva/sclera: Conjunctivae normal.      Pupils: Pupils are equal, round, and reactive to light.   Cardiovascular:      Rate and Rhythm: Normal rate and regular rhythm.      Heart sounds: Normal heart sounds.   Pulmonary:      Effort: Pulmonary effort is normal.      Breath sounds: Normal breath sounds.   Abdominal:      General: Bowel sounds are normal.      Palpations: Abdomen " "is soft.   Musculoskeletal:         General: Tenderness (left proximal toe) present.      Cervical back: Normal range of motion and neck supple.   Lymphadenopathy:      Cervical: No cervical adenopathy.   Skin:     General: Skin is warm and dry.   Neurological:      General: No focal deficit present.      Mental Status: She is alert and oriented to person, place, and time.   Psychiatric:         Mood and Affect: Mood normal.         Behavior: Behavior normal.         Thought Content: Thought content normal.         Procedure:      Historical Data:    HTN with mild LVH-stable on ARB  Migraine-stable  Abnormal wt gain-stable  Situational depression-cont Wellbutrin  Neck pain and pain in multiple joints-Tylenol prn, add celebrex prn  Obesity,BMI over 27 with HTN-counseled on diet  Left toe fracture-re image and will refer to Podiatry, will go back to \"boot\"     prior labs noted and dw patient, counseled on low carb/ncs    Current Outpatient Medications:     buPROPion XL (WELLBUTRIN XL) 300 MG 24 hr tablet, TAKE ONE TABLET BY MOUTH EVERY MORNING, Disp: 90 tablet, Rfl: 3    celecoxib (CeleBREX) 200 MG capsule, TAKE 1 CAPSULE BY MOUTH DAILY AS NEEDED FOR MODERATE PAIN, Disp: 30 capsule, Rfl: 2    dutasteride (AVODART) 0.5 MG capsule, Take 1 capsule by mouth Daily., Disp: , Rfl:     estradiol (Estrace) 0.5 MG tablet, Take 1 tablet by mouth Daily., Disp: 90 tablet, Rfl: 3    Multiple Vitamins-Minerals (PRESERVISION AREDS 2+MULTI VIT PO), Take 1 tablet by mouth Daily., Disp: , Rfl:     olmesartan (BENICAR) 40 MG tablet, TAKE ONE TABLET BY MOUTH ONCE NIGHTLY, Disp: 90 tablet, Rfl: 3    Progesterone (PROMETRIUM) 200 MG capsule, Take 1 capsule by mouth Daily., Disp: 90 capsule, Rfl: 3    tiZANidine (Zanaflex) 4 MG tablet, Take 1 tablet by mouth At Night As Needed for Muscle Spasms., Disp: 30 tablet, Rfl: 5    traMADol (ULTRAM) 50 MG tablet, Take 1 tablet by mouth Every 6 (Six) Hours As Needed for Moderate Pain for up to 8 " doses., Disp: 8 tablet, Rfl: 0    Semaglutide-Weight Management (Wegovy) 0.5 MG/0.5ML solution auto-injector, Inject 0.5 mL under the skin into the appropriate area as directed Every 7 (Seven) Days., Disp: 2 mL, Rfl: 5        Diagnoses and all orders for this visit:    1. Essential hypertension (Primary)    2. Closed nondisplaced fracture of phalanx of toe of left foot with routine healing, unspecified toe, subsequent encounter  -     XR Foot 3+ View Left  -     Ambulatory Referral to Podiatry    3. Reactive depression

## 2024-12-23 ENCOUNTER — HOSPITAL ENCOUNTER (OUTPATIENT)
Dept: MAMMOGRAPHY | Facility: HOSPITAL | Age: 59
Discharge: HOME | End: 2024-12-23
Admitting: NURSE PRACTITIONER
Payer: COMMERCIAL

## 2024-12-23 DIAGNOSIS — Z12.31 VISIT FOR SCREENING MAMMOGRAM: ICD-10-CM

## 2024-12-23 PROCEDURE — 77067 SCR MAMMO BI INCL CAD: CPT

## 2024-12-23 PROCEDURE — 77063 BREAST TOMOSYNTHESIS BI: CPT

## 2025-01-24 DIAGNOSIS — E66.3 OVERWEIGHT (BMI 25.0-29.9): ICD-10-CM

## 2025-01-24 RX ORDER — SEMAGLUTIDE 0.5 MG/.5ML
INJECTION, SOLUTION SUBCUTANEOUS
Qty: 2 ML | Refills: 5 | Status: SHIPPED | OUTPATIENT
Start: 2025-01-24

## 2025-03-13 ENCOUNTER — TELEPHONE (OUTPATIENT)
Dept: INTERNAL MEDICINE | Facility: CLINIC | Age: 60
End: 2025-03-13

## 2025-03-13 NOTE — TELEPHONE ENCOUNTER
Caller: Karthik Ibrahim A     Relationship: [unfilled]     Best call back number: [phone]    What is your medical concern? PT STATED THAT SHE HAS CMJ, IT HAS BEEN BOTHERING HER OVER A MONTH AND WILL LIKE TO KNOW WHAT DR SUGGEST.

## 2025-04-01 ENCOUNTER — OFFICE VISIT (OUTPATIENT)
Dept: INTERNAL MEDICINE | Facility: CLINIC | Age: 60
End: 2025-04-01
Payer: COMMERCIAL

## 2025-04-01 VITALS
OXYGEN SATURATION: 98 % | HEART RATE: 76 BPM | WEIGHT: 166 LBS | DIASTOLIC BLOOD PRESSURE: 74 MMHG | SYSTOLIC BLOOD PRESSURE: 126 MMHG | BODY MASS INDEX: 26.68 KG/M2 | HEIGHT: 66 IN

## 2025-04-01 DIAGNOSIS — F32.9 REACTIVE DEPRESSION: ICD-10-CM

## 2025-04-01 DIAGNOSIS — R93.1 ABNORMAL CARDIAC CT ANGIOGRAPHY: ICD-10-CM

## 2025-04-01 DIAGNOSIS — F41.8 SITUATIONAL ANXIETY: ICD-10-CM

## 2025-04-01 DIAGNOSIS — I10 ESSENTIAL HYPERTENSION: Primary | ICD-10-CM

## 2025-04-01 PROCEDURE — 99214 OFFICE O/P EST MOD 30 MIN: CPT | Performed by: INTERNAL MEDICINE

## 2025-04-01 RX ORDER — PAROXETINE 10 MG/1
10 TABLET, FILM COATED ORAL NIGHTLY
Qty: 90 TABLET | Refills: 3 | Status: SHIPPED | OUTPATIENT
Start: 2025-04-01

## 2025-04-01 RX ORDER — ASPIRIN 81 MG/1
81 TABLET ORAL DAILY
COMMUNITY

## 2025-04-01 RX ORDER — PRAVASTATIN SODIUM 40 MG
40 TABLET ORAL NIGHTLY
Qty: 90 TABLET | Refills: 3 | Status: SHIPPED | OUTPATIENT
Start: 2025-04-01

## 2025-04-01 NOTE — PROGRESS NOTES
Patient is a 59 y.o. female who is here for a follow up of hypertension.  Chief Complaint   Patient presents with    Hypertension         HPI:    Here for mgmt of HTN and hyperlipidemia.  A lot of anxiety that is not controlled.  Compliant with CPAP.   Sleeps about 5 per night.  Does not check BP.     History:     Patient Active Problem List   Diagnosis    Menstrual migraine without status migrainosus, not intractable    Abnormal liver enzymes    Routine general medical examination at a health care facility    Situational anxiety    Essential hypertension    Dizziness    Reactive depression    Multiple joint pain    Neck pain    Overweight (BMI 25.0-29.9)    Normal gynecologic examination    Closed nondisplaced fracture of phalanx of toe of left foot with routine healing    Abnormal cardiac CT angiography       Past Medical History:   Diagnosis Date    Abnormal Pap smear of cervix     Allergic always    seasonal/environmental    Anxiety many years    anxiety    Arthritis     Cancer     Skin    Cholelithiasis     removed    Colon polyp     first colonoscopy    Depression many years    low grade    Endometriosis     Family Hx of osteoporosis MGM/osteopenia mother/patient normal DEXA age 51 02/15/2018    Hard to intubate     Headache     Hyperplastic colon polyp 2011 02/15/2018    Hypertension     Injury of neck 2/7/22    slip & fall, concussion    Migraine     Ovarian cyst     PMS (premenstrual syndrome)     Routine general medical examination at a health care facility 07/22/2020    Varicella     Visual impairment man-years    macular degeneration       Past Surgical History:   Procedure Laterality Date    CHOLECYSTECTOMY      COLONOSCOPY      COLONOSCOPY W/ BIOPSIES  2011 and 2014    Hyperplastic 2014    D & C WITH SUCTION      LAPAROSCOPIC CHOLECYSTECTOMY      SKIN CANCER EXCISION      facial moles removed    TUBAL ABDOMINAL LIGATION      WISDOM TOOTH EXTRACTION         Current Outpatient Medications on File Prior  to Visit   Medication Sig    aspirin 81 MG EC tablet Take 1 tablet by mouth Daily.    buPROPion XL (WELLBUTRIN XL) 300 MG 24 hr tablet TAKE ONE TABLET BY MOUTH EVERY MORNING    celecoxib (CeleBREX) 200 MG capsule TAKE 1 CAPSULE BY MOUTH DAILY AS NEEDED FOR MODERATE PAIN    dutasteride (AVODART) 0.5 MG capsule Take 1 capsule by mouth Daily.    estradiol (Estrace) 0.5 MG tablet Take 1 tablet by mouth Daily.    Multiple Vitamins-Minerals (PRESERVISION AREDS 2+MULTI VIT PO) Take 1 tablet by mouth Daily.    olmesartan (BENICAR) 40 MG tablet TAKE ONE TABLET BY MOUTH ONCE NIGHTLY    Progesterone (PROMETRIUM) 200 MG capsule Take 1 capsule by mouth Daily.    tiZANidine (Zanaflex) 4 MG tablet Take 1 tablet by mouth At Night As Needed for Muscle Spasms.    traMADol (ULTRAM) 50 MG tablet Take 1 tablet by mouth Every 6 (Six) Hours As Needed for Moderate Pain for up to 8 doses.    Wegovy 0.5 MG/0.5ML solution auto-injector INJECT 0.5 MG UNDER THE SKIN ONCE WEEKLY (Patient not taking: Reported on 4/1/2025)     No current facility-administered medications on file prior to visit.       Family History   Problem Relation Age of Onset    Osteoporosis Mother     Hepatitis Father     Lung cancer Father 57        smoker    Vision loss Father     Ovarian cancer Maternal Grandmother 70    Osteoporosis Maternal Grandmother     Kidney disease Maternal Grandmother     Skin cancer Maternal Grandmother     Melanoma Maternal Grandmother     Vision loss Paternal Grandmother     Colon cancer Maternal Aunt     Heart disease Maternal Grandfather     Colon cancer Paternal Grandfather     Vision loss Paternal Aunt     BRCA 1/2 Neg Hx     Endometrial cancer Neg Hx     Breast cancer Neg Hx        Social History     Socioeconomic History    Marital status:     Number of children: 4    Highest education level: Some college, no degree   Tobacco Use    Smoking status: Former     Current packs/day: 0.00     Average packs/day: 0.7 packs/day for 19.5  "years (13.0 ttl pk-yrs)     Types: Cigarettes     Start date: 1997     Quit date: 1/16/2010     Years since quitting: 15.2    Smokeless tobacco: Never   Vaping Use    Vaping status: Never Used   Substance and Sexual Activity    Alcohol use: Yes     Alcohol/week: 14.0 standard drinks of alcohol     Types: 14 Glasses of wine per week     Comment: suggest 10 per week max    Drug use: No    Sexual activity: Yes     Partners: Male     Birth control/protection: Surgical         Review of Systems   Constitutional:  Negative for chills and fever.   HENT:  Negative for congestion, ear pain, hearing loss, rhinorrhea, sinus pressure, sore throat and trouble swallowing.    Eyes:  Negative for discharge and itching.   Respiratory:  Negative for cough, chest tightness and shortness of breath.    Cardiovascular:  Negative for chest pain, palpitations and leg swelling.   Gastrointestinal:  Negative for abdominal pain, blood in stool, constipation, diarrhea and vomiting.        2018 colonoscopy, repeat in 10 years   Endocrine: Negative for polydipsia and polyuria.   Genitourinary:  Negative for difficulty urinating, dysuria, enuresis, frequency, hematuria and urgency.        12/23 mammogram  Menses in 8/20   Musculoskeletal:  Positive for arthralgias. Negative for gait problem and joint swelling.        See HPI   Skin:  Negative for rash and wound.   Allergic/Immunologic: Negative for immunocompromised state.   Neurological:  Negative for dizziness, syncope, weakness, light-headedness and numbness.   Hematological:  Does not bruise/bleed easily.   Psychiatric/Behavioral:  Positive for sleep disturbance. Negative for behavioral problems and dysphoric mood. The patient is nervous/anxious.        /74   Pulse 76   Ht 167.6 cm (65.98\")   Wt 75.3 kg (166 lb)   LMP 11/13/2021   SpO2 98%   BMI 26.81 kg/m²       Physical Exam  Constitutional:       Appearance: Normal appearance. She is well-developed. She is obese.   HENT:      " Head: Normocephalic and atraumatic.      Right Ear: External ear normal.      Left Ear: External ear normal.      Nose: Nose normal.      Mouth/Throat:      Mouth: Mucous membranes are moist.      Pharynx: Oropharynx is clear.   Eyes:      Extraocular Movements: Extraocular movements intact.      Conjunctiva/sclera: Conjunctivae normal.      Pupils: Pupils are equal, round, and reactive to light.   Cardiovascular:      Rate and Rhythm: Normal rate and regular rhythm.      Heart sounds: Normal heart sounds.   Pulmonary:      Effort: Pulmonary effort is normal.      Breath sounds: Normal breath sounds.   Abdominal:      General: Bowel sounds are normal.      Palpations: Abdomen is soft.   Musculoskeletal:         General: Normal range of motion.      Cervical back: Normal range of motion and neck supple.   Lymphadenopathy:      Cervical: No cervical adenopathy.   Skin:     General: Skin is warm and dry.   Neurological:      General: No focal deficit present.      Mental Status: She is alert and oriented to person, place, and time.   Psychiatric:         Mood and Affect: Mood normal.         Behavior: Behavior normal.         Thought Content: Thought content normal.         Procedure:      Historical Data:    HTN with mild LVH-stable on ARB  Migraine-stable  Abnormal wt gain-stable  Situational depression-cont Wellbutrin  ANU-add Paxil  Neck pain and pain in multiple joints-Tylenol prn, add celebrex prn  Obesity,BMI over 27 with HTN-counseled on diet  Abnormal CT Angio Coronary-add statin     prior labs noted and dw patient, counseled on low carb/ncs    Current Outpatient Medications:     aspirin 81 MG EC tablet, Take 1 tablet by mouth Daily., Disp: , Rfl:     buPROPion XL (WELLBUTRIN XL) 300 MG 24 hr tablet, TAKE ONE TABLET BY MOUTH EVERY MORNING, Disp: 90 tablet, Rfl: 3    celecoxib (CeleBREX) 200 MG capsule, TAKE 1 CAPSULE BY MOUTH DAILY AS NEEDED FOR MODERATE PAIN, Disp: 30 capsule, Rfl: 2    dutasteride (AVODART)  0.5 MG capsule, Take 1 capsule by mouth Daily., Disp: , Rfl:     estradiol (Estrace) 0.5 MG tablet, Take 1 tablet by mouth Daily., Disp: 90 tablet, Rfl: 3    Multiple Vitamins-Minerals (PRESERVISION AREDS 2+MULTI VIT PO), Take 1 tablet by mouth Daily., Disp: , Rfl:     olmesartan (BENICAR) 40 MG tablet, TAKE ONE TABLET BY MOUTH ONCE NIGHTLY, Disp: 90 tablet, Rfl: 3    PARoxetine (Paxil) 10 MG tablet, Take 1 tablet by mouth Every Night., Disp: 90 tablet, Rfl: 3    pravastatin (Pravachol) 40 MG tablet, Take 1 tablet by mouth Every Night., Disp: 90 tablet, Rfl: 3    Progesterone (PROMETRIUM) 200 MG capsule, Take 1 capsule by mouth Daily., Disp: 90 capsule, Rfl: 3    tiZANidine (Zanaflex) 4 MG tablet, Take 1 tablet by mouth At Night As Needed for Muscle Spasms., Disp: 30 tablet, Rfl: 5    traMADol (ULTRAM) 50 MG tablet, Take 1 tablet by mouth Every 6 (Six) Hours As Needed for Moderate Pain for up to 8 doses., Disp: 8 tablet, Rfl: 0    Wegovy 0.5 MG/0.5ML solution auto-injector, INJECT 0.5 MG UNDER THE SKIN ONCE WEEKLY (Patient not taking: Reported on 4/1/2025), Disp: 2 mL, Rfl: 5        Diagnoses and all orders for this visit:    1. Essential hypertension (Primary)    2. Reactive depression    3. Situational anxiety  -     PARoxetine (Paxil) 10 MG tablet; Take 1 tablet by mouth Every Night.  Dispense: 90 tablet; Refill: 3    4. Abnormal cardiac CT angiography  -     pravastatin (Pravachol) 40 MG tablet; Take 1 tablet by mouth Every Night.  Dispense: 90 tablet; Refill: 3